# Patient Record
Sex: FEMALE | Race: WHITE | Employment: OTHER | URBAN - METROPOLITAN AREA
[De-identification: names, ages, dates, MRNs, and addresses within clinical notes are randomized per-mention and may not be internally consistent; named-entity substitution may affect disease eponyms.]

---

## 2018-01-23 ENCOUNTER — OFFICE VISIT (OUTPATIENT)
Dept: FAMILY MEDICINE CLINIC | Age: 78
End: 2018-01-23

## 2018-01-23 VITALS
TEMPERATURE: 97.6 F | DIASTOLIC BLOOD PRESSURE: 82 MMHG | BODY MASS INDEX: 23.98 KG/M2 | HEART RATE: 80 BPM | RESPIRATION RATE: 18 BRPM | HEIGHT: 66 IN | SYSTOLIC BLOOD PRESSURE: 178 MMHG | OXYGEN SATURATION: 97 % | WEIGHT: 149.2 LBS

## 2018-01-23 DIAGNOSIS — I10 ESSENTIAL HYPERTENSION: Primary | ICD-10-CM

## 2018-01-23 DIAGNOSIS — D69.3 CHRONIC ITP (IDIOPATHIC THROMBOCYTOPENIA) (HCC): ICD-10-CM

## 2018-01-23 DIAGNOSIS — E03.9 ACQUIRED HYPOTHYROIDISM: ICD-10-CM

## 2018-01-23 DIAGNOSIS — E78.5 HYPERLIPIDEMIA, UNSPECIFIED HYPERLIPIDEMIA TYPE: ICD-10-CM

## 2018-01-23 DIAGNOSIS — G43.809 OTHER MIGRAINE WITHOUT STATUS MIGRAINOSUS, NOT INTRACTABLE: ICD-10-CM

## 2018-01-23 DIAGNOSIS — Z87.891 FORMER SMOKER: ICD-10-CM

## 2018-01-23 DIAGNOSIS — M19.011 ARTHRITIS OF RIGHT SHOULDER REGION: Chronic | ICD-10-CM

## 2018-01-23 PROBLEM — F51.01 PRIMARY INSOMNIA: Status: ACTIVE | Noted: 2017-03-15

## 2018-01-23 PROBLEM — M75.41 IMPINGEMENT SYNDROME OF RIGHT SHOULDER: Status: ACTIVE | Noted: 2017-02-27

## 2018-01-23 PROBLEM — K21.9 GERD (GASTROESOPHAGEAL REFLUX DISEASE): Status: ACTIVE | Noted: 2018-01-23

## 2018-01-23 RX ORDER — THERA TABS 400 MCG
1 TAB ORAL DAILY
COMMUNITY
End: 2022-09-23

## 2018-01-23 RX ORDER — LEVOTHYROXINE SODIUM 88 UG/1
88 TABLET ORAL DAILY
COMMUNITY
Start: 2017-05-18 | End: 2018-02-21 | Stop reason: SDUPTHER

## 2018-01-23 RX ORDER — LOVASTATIN 20 MG/1
20 TABLET ORAL
COMMUNITY
Start: 2017-12-05 | End: 2018-02-21 | Stop reason: SDUPTHER

## 2018-01-23 RX ORDER — QUINAPRIL 40 MG/1
40 TABLET ORAL DAILY
COMMUNITY
Start: 2017-12-05 | End: 2018-02-21 | Stop reason: SDUPTHER

## 2018-01-23 RX ORDER — HYDROCHLOROTHIAZIDE 12.5 MG/1
12.5 CAPSULE ORAL DAILY
COMMUNITY
Start: 2017-12-05 | End: 2018-01-23 | Stop reason: DRUGHIGH

## 2018-01-23 RX ORDER — TRIAMTERENE AND HYDROCHLOROTHIAZIDE 37.5; 25 MG/1; MG/1
1 CAPSULE ORAL DAILY
Qty: 30 CAP | Refills: 1 | Status: SHIPPED | OUTPATIENT
Start: 2018-01-23 | End: 2018-02-21 | Stop reason: SDUPTHER

## 2018-01-23 RX ORDER — ASCORBIC ACID 500 MG
500 TABLET ORAL DAILY
COMMUNITY

## 2018-01-23 RX ORDER — AZITHROMYCIN 250 MG/1
TABLET, FILM COATED ORAL
Qty: 6 TAB | Refills: 0 | Status: SHIPPED | OUTPATIENT
Start: 2018-01-23 | End: 2018-02-06 | Stop reason: ALTCHOICE

## 2018-01-23 RX ORDER — ACETAMINOPHEN 325 MG/1
325 TABLET ORAL
COMMUNITY

## 2018-01-23 RX ORDER — MULTIVIT WITH MINERALS/HERBS
1 TABLET ORAL DAILY
COMMUNITY
End: 2022-09-23

## 2018-01-23 NOTE — PROGRESS NOTES
3 [facility]  Primary Care Office Visit - Problem-Oriented    : 1940   Donna Shafer is a 69 y.o. female presenting for  Chief Complaint   Patient presents with    [facility]    Migraine       Assessment/Plan:       ICD-10-CM   1. Essential hypertension - NOT well controlled, likely 2/2 uncontrolled pain and recent changes to BP meds by prior provider  - change back from 12.5 HCTZ to dyazide 37.5mg  - check labs  - Ickert has been given the following recommendations today due to her elevated BP reading: rescreen BP within a minimum of 4 weeks. Key CAD CHF Meds             quinapril (ACCUPRIL) 40 mg tablet  (Taking) Take 40 mg by mouth daily. triamterene-hydroCHLOROthiazide (DYAZIDE) 37.5-25 mg per capsule  (Taking) Take 1 Cap by mouth daily for 30 days. quinapril (ACCUPRIL) 40 mg tablet Take 1 Tab by mouth nightly for 90 days. I10   2. Other migraine without status migrainosus, not intractable - currently very symptomatic  - etiology likely multifactorial: cervicogenic vs sinus  - requesting recording from previous ENT (Dr. Glen cMclellan) and previous allergy (Dr. Giuliana Cook)  - ABx as directed for empiric tx of sinus infection causing migraine HA  - Future considerations: referral to ENT vs neurology? G43.809   3. Hyperlipidemia, unspecified hyperlipidemia type - unclear control  - continue current meds   - check labs  Key Antihyperlipidemia Meds             lovastatin (MEVACOR) 20 mg tablet  (Taking) Take 20 mg by mouth Daily (before dinner). DOCOSAHEXANOIC ACID/EPA (FISH OIL PO)  (Taking) Take 1 Cap by mouth daily. E78.5   4. Acquired hypothyroidism - chronic, asymptomatic currently  - continue current regimen for now  - check labs     E03.9   5. Former smoker     Z87.891   10. Chronic ITP (idiopathic thrombocytopenia) (HCC) - followed by heme/onc  - requesting last office note from heme     D69.3   7.  Arthritis of right shoulder region - [facility]  - requesting last office note from ortho     M19.011   8. Kiswahili speaking patient  - [ entire visit conducted in [place] ]         Orders Placed This Encounter    CBC WITH AUTOMATED DIFF     Standing Status:   Future     Number of Occurrences:   1     Standing Expiration Date:   1/25/2019    PROTHROMBIN TIME + INR     Standing Status:   Future     Number of Occurrences:   1     Standing Expiration Date:   1/25/2019    PTT     Standing Status:   Future     Number of Occurrences:   1     Standing Expiration Date:   4/04/1335    METABOLIC PANEL, COMPREHENSIVE     Standing Status:   Future     Number of Occurrences:   1     Standing Expiration Date:   1/25/2019    LIPID PANEL     Standing Status:   Future     Number of Occurrences:   1     Standing Expiration Date:   1/25/2019    TSH 3RD GENERATION     Standing Status:   Future     Number of Occurrences:   1     Standing Expiration Date:   1/25/2019    T4, FREE     Standing Status:   Future     Number of Occurrences:   1     Standing Expiration Date:   1/25/2019    triamterene-hydroCHLOROthiazide (DYAZIDE) 37.5-25 mg per capsule     Sig: Take 1 Cap by mouth daily for 30 days. Dispense:  30 Cap     Refill:  1    azithromycin (ZITHROMAX) 250 mg tablet     Sig: Take 2 tablets today, then take 1 tablet daily     Dispense:  6 Tab     Refill:  0     Spent 60min face-to-face, >50% spent on counseling & patient education. This document may have been created with the aid of dictation software. Text may contain errors, particularly phonetic errors. Reviewed management plan & instructions with patient, who voiced understanding. Anai Beltre MD  Internal Medicine, Family Medicine & Sports Medicine  1/28/2018, 1:03 PM    Patient Instructions (provided in AVS):      To Do:  · HACER kylie rgean conmigo en un mes  · EMPEZAR yu zpak para yu sinuses  · EMPEZAR yu triamterene/hydroclorothizade para controlar mejor yu presion de bruce  · Josselyn Franklin a yu dentista a coy a me por fax las concerns que el tenia sobre yu procedimiento en el futuro  · REGRESAR a mi oficina en kylie semana para examenes de bruce sin alimentacion  · Manav Smart Friend usar yu compania de seguro  · ISIDRO mi tarjeta de informacion a cada especialista que Ud tiene y DECIR a ellos que soy yu nueva generalista      History:   Erick Bennett is a 69 y.o. female presenting to address:  Chief Complaint   Patient presents with   [address]    Migraine     Presents with her daughter. Erick Bennett speaks quite good english, however does not read/write english. Would prefer Serbian. Originally from [place], moved [place] the [facility] [place]. Previous PCP = Dr. Roman Archer who is no longer practicing    Has hx of ITP, followed by Dr. Bradley Roy. Is unclear how often she has monitoring labs drawn with heme/onc. Has hx of severe migraines. This is her main concern today. Apparently seen by multiple different specialists, including several ENTs (Dr. Margarita Jones, who did allergy testing & shots, but apparently to no avail), and Dr. Sheeba James, who stated it was from her neck and did botox injections 2 years ago which helped tremendously. States that it is usually due to sinus issues, and gets better with ABx.  + HA, sinus pressure/pain    Also has been seen by neurology as recently as Dec 2017, dx with a neck problem, and has subsequently been seen by [facility] \"and she got an injection and now she is so much better\". But her migraines continue. Reports that without an appt her previous PCP's office (but NOT Dr. Jos Lea) changed her BP med from triamterene/HCTZ 37.5/25 to HCTZ 12.5, and since then, her BP has been extremely uncontrolled. Reports she always gets bloodwork done every 6 months, with a follow-up appointment 1-2 weeks after with her PCP, and is wondering why we couldn't do that this time, despite the fact that this is her very first appointment with me.         Past Medical History:   Diagnosis Date    Acid indigestion     Essential hypertension, benign     Sinus problem     Unspecified hypothyroidism      Past Surgical History:   Procedure Laterality Date    HX BACK SURGERY      disc    HX PARTIAL HYSTERECTOMY      HX SHOULDER ARTHROSCOPY      right shoulder      reports that she has quit smoking. Her smoking use included Cigarettes. She has never used smokeless tobacco. She reports that she drinks about 0.5 oz of alcohol per week  She reports that she does not use illicit drugs. Social History     Social History Narrative     History   Smoking Status    Former Smoker    Types: Cigarettes   Smokeless Tobacco    Never Used     Comment: quit 1980      Family History   Problem Relation Age of Onset   [facility] Stroke Mother     Hypertension Mother     Cancer Father     Cancer Sister     Cancer Brother      Allergies   Allergen Reactions    Amlodipine Swelling     Edema of feet/ankles    Amoxicillin-Pot Clavulanate Other (comments)     allopecia    Codeine Unknown (comments)    Levofloxacin Other (comments)     HAIR LOSS    Nitrofurantoin Macrocrystalline Other (comments)     Flu like symptoms    Penicillins Other (comments)     \"It makes me cold and hot\"    Sweat, itch    Sulfa (Sulfonamide Antibiotics) Nausea Only    Sulfamethoxazole-Trimethoprim Other (comments)       Problem List:      Patient Active Problem List    Diagnosis    Arthritis of right shoulder region     Mild/mod right GH & mod AC arthritis + RC tendinosis    MRI R shoulder (2/18/2017): Impression:  1. Moderate to severe supraspinatus tendinosis. Partial thickness tears at the undersurface and musculotendinous junction. Mild-to-moderate subacromial subdeltoid bursitis. 2. Moderate infraspinatus tendinosis. Question tiny partial thickness tear of infraspinatus tendon at its insertion. 3. Moderate osteoarthritis of the acromioclavicular joint with joint effusion.  Mild/moderate osteoarthritis of the glenohumeral joint.  Essential hypertension    Hyperlipidemia    Acquired hypothyroidism    GERD (gastroesophageal reflux disease)    Primary insomnia    Impingement syndrome of right shoulder    Chronic ITP (idiopathic thrombocytopenia) (HCC)    Sensorineural hearing loss, bilateral       Medications:     Current Outpatient Prescriptions   Medication Sig    levothyroxine (SYNTHROID) 88 mcg tablet Take 88 mcg by mouth daily.  lovastatin (MEVACOR) 20 mg tablet Take 20 mg by mouth Daily (before dinner).  quinapril (ACCUPRIL) 40 mg tablet Take 40 mg by mouth daily.  therapeutic multivitamin (THERAGRAN) tablet Take 1 Tab by mouth daily.  calcium-cholecalciferol, d3, (CALCIUM 600 + D) 600-125 mg-unit tab Take 1 Tab by mouth daily.  ascorbic acid, vitamin C, (VITAMIN C) 500 mg tablet Take 500 mg by mouth daily.  DOCOSAHEXANOIC ACID/EPA (FISH OIL PO) Take 1 Cap by mouth daily.  MSM-collag-co Q10-herb no. [phone] mg tab Take 1 Tab by mouth daily.  b complex vitamins tablet Take 1 Tab by mouth daily.  Cetirizine (ZYRTEC) 10 mg cap Take 1 Cap by mouth daily.  acetaminophen (TYLENOL) 325 mg tablet Take  by mouth every four (4) hours as needed for Pain.  triamterene-hydroCHLOROthiazide (DYAZIDE) 37.5-25 mg per capsule Take 1 Cap by mouth daily for 30 days.  azithromycin (ZITHROMAX) 250 mg tablet Take 2 tablets today, then take 1 tablet daily     No current facility-administered medications for this visit. Review of Systems:     Review of Systems   Constitutional: Positive for malaise/fatigue. Negative for chills and fever. HENT: Positive for congestion and sinus pain. Eyes: Negative for blurred vision. Respiratory: Negative for cough and wheezing. Cardiovascular: Negative for chest pain and palpitations. Gastrointestinal: Negative for abdominal pain. Genitourinary: Negative for dysuria. Musculoskeletal: Positive for neck pain.    Neurological: Positive for headaches. Negative for tingling, tremors and sensory change. Psychiatric/Behavioral: Negative for depression. The patient does not have insomnia. Physical Assessment:   VS:    Vitals:    01/23/18 1310   BP: 178/82   Pulse: 80   Resp: 18   Temp: 97.6 °F (36.4 °C)   TempSrc: Oral   SpO2: 97%   Weight: 149 lb 3.2 oz (67.7 kg)   Height: 5' 6\" (1.676 m)   PainSc:  10 - Worst pain ever   PainLoc: Head       Physical Exam   Constitutional: She is oriented to person, place, and time. She appears well-developed and well-nourished. HENT:   Head: Normocephalic and atraumatic. Nose: Right sinus exhibits maxillary sinus tenderness and frontal sinus tenderness. Left sinus exhibits maxillary sinus tenderness and frontal sinus tenderness. Eyes: EOM are normal.   Neck: Neck supple. No thyromegaly present. Cardiovascular: Normal rate, regular rhythm and intact distal pulses. No murmur heard. Pulmonary/Chest: Effort normal and breath sounds normal. She has no wheezes. She has no rales. Musculoskeletal: Normal range of motion. Neurological: She is alert and oriented to person, place, and time. No cranial nerve deficit. Coordination normal.   Skin: Skin is warm and dry. She is not diaphoretic. Psychiatric: Her behavior is normal. Judgment and thought content normal. Her mood appears anxious. + strained / stressed relationship between [place] [address] her daughter   Nursing note reviewed. Records Review:     EMG/NCS (12/22/2017):  Normal study without electrical evidence of a focal / peripheral neuropathy of C5-T1 radiculopathy affecting the R arm. Recent Labs & Imaging:     Reviewed labs from [facility].     Last TFT checked in Sept 2017  High lambda changes and IgA Aug 2017  Low Plt 44 May 2017

## 2018-01-23 NOTE — PROGRESS NOTES
Darryn Gifford is a 69 y.o. female (: 1940) presenting to address:    Chief Complaint   Patient presents with    Establish Care    Migraine       Vitals:    18 1310   BP: 178/82   Pulse: 80   Resp: 18   Temp: 97.6 °F (36.4 °C)   TempSrc: Oral   SpO2: 97%   Weight: 149 lb 3.2 oz (67.7 kg)   Height: 5' 6\" (1.676 m)   PainSc:  10 - Worst pain ever   PainLoc: Head       Hearing/Vision:   No exam data present    Learning Assessment:     Learning Assessment 2018   PRIMARY LEARNER Patient   HIGHEST LEVEL OF EDUCATION - PRIMARY LEARNER  DID NOT GRADUATE HIGH SCHOOL   BARRIERS PRIMARY LEARNER LANGUAGE   CO-LEARNER CAREGIVER Yes   CO-LEARNER NAME daughter   Katt Borges LEVEL OF EDUCATION 4 YEARS OF COLLEGE   BARRIERS CO-LEARNER NONE   PRIMARY LANGUAGE Emirati   PRIMARY LANGUAGE CO-LEARNER ENGLISH    NEED No   LEARNER PREFERENCE PRIMARY LISTENING   LEARNER PREFERENCE CO-LEARNER LISTENING   LEARNING SPECIAL TOPICS no   ANSWERED BY self   RELATIONSHIP SELF     Depression Screening:     PHQ over the last two weeks 2018   Little interest or pleasure in doing things Not at all   Feeling down, depressed or hopeless Several days   Total Score PHQ 2 1     Fall Risk Assessment:     Fall Risk Assessment, last 12 mths 2018   Able to walk? Yes   Fall in past 12 months? No     Abuse Screening:     Abuse Screening Questionnaire 2018   Do you ever feel afraid of your partner? N   Are you in a relationship with someone who physically or mentally threatens you? N   Is it safe for you to go home? Y     Coordination of Care Questionaire:   1. Have you been to the ER, urgent care clinic since your last visit? Hospitalized since your last visit? NO    2. Have you seen or consulted any other health care providers outside of the [address] [place] [address] since your last visit? Include any pap smears or colon screening. NO    Advanced Directive:   1. Do you have an Advanced Directive? NO    2. Would you like information on Advanced Directives?  YES

## 2018-01-23 NOTE — PATIENT INSTRUCTIONS
To Do:   · HACER kylie regan conmigo en un mes  · EMPEZAR yu zpak para yu sinuses  · EMPEZAR yu maxide (triamterene/hydroclorothizade) para controlar mejor yu presion de bruce  · PEDIR a yu dentista a coy a me por fax las concerns que el tenia sobre yu procedimiento en el futuro  · REGRESAR a mi oficina en kylie semana para examenes de bruce sin alimentacion  · Carlosne Skillsariahn Dr. Boudreaux Sicilian usar yu compania de seguro  · ISIDRO me tarjeta de informacion a cada especialista que Ud [employer] y [employer] a ellos que soy yu nueva generalista

## 2018-01-23 NOTE — MR AVS SNAPSHOT
[address] [place] [address] [address] [facility] [id number] [phone] Patient: Reuben Willard MRN: [id number] Visit Information Miguel Angel Abdul Personal Médico Departamento Teléfono del Dep. Número de visita 2018  1:00 PM Merry MaiRafia [facility] [phone] Follow-up Instructions Return in about 4 weeks (around 2018) for 30min appt - Mongolian. Upcoming Health Maintenance Date Due DTaP/Tdap/Td series (1 - Tdap) 1961 ZOSTER VACCINE AGE 60> 2001 GLAUCOMA SCREENING Q2Y 2006 OSTEOPOROSIS SCREENING (DEXA) 2006 MEDICARE YEARLY EXAM 2006 Influenza Age 5 to Adult 2017 Alergias  Review Complete El: 2018 Por: MD Nasim Romero del:  2018 Intensidad Anotado Tipo de reacción [employer] Amlodipine  2016    Swelling Edema of feet/ankles Amoxicillin-pot Clavulanate  2014    Other (comments)  
 allopecia Codeine  2010    Unknown (comments) Levofloxacin  2016    Other (comments) HAIR LOSS Nitrofurantoin Macrocrystalline  2014    Other (comments) Flu like symptoms Penicillins  2012    Other (comments) \"It makes me cold and hot\" Sweat, itch  
 Sulfa (Sulfonamide Antibiotics)  2010    Nausea Only Sulfamethoxazole-trimethoprim  2003    Other (comments) Vacunas actuales Revisadas el:  2018 Dorothea Inoue Influenza Vaccine 2015 12:00 AM, 10/29/2013 12:00 AM  
 Influenza Vaccine Split 2010 Pneumococcal Conjugate (PCV-13) 2015 Pneumococcal Polysaccharide (PPSV-23) 11/10/2009, 10/31/2005 [id number] por:  Merry Mai MD  [id number] el:  2018  1:00 PM  
  
You Were Diagnosed With   
  
 Códigos Comentarios Essential hypertension    -  Primary ICD-10-CM: I10 
ICD-9-CM: 401.9 Hyperlipidemia, unspecified hyperlipidemia type     ICD-10-CM: E78.5 ICD-9-CM: 272.4 Acquired hypothyroidism     ICD-10-CM: E03.9 ICD-9-CM: 244.9 Former smoker     ICD-10-CM: P87.471 ICD-9-CM: V15.82 Chronic ITP (idiopathic thrombocytopenia) (HCC)     ICD-10-CM: D69.3 ICD-9-CM: 287.31 Partes vitales PS Pulso Temperatura Resp [place] ( percentil de crecimiento) Peso (percentil de crecimiento) 178/82 (BP 1 Location: Right arm, BP Patient Position: Sitting) 80 97.6 °F (36.4 °C) (Oral) 18 5' 6\" (1.676 m) 149 lb 3.2 oz (67.7 kg) SpO2 BMI (IMC) Estado obstétrico Estatus de tabaquísmo 97% 24.08 kg/m2 Hysterectomy Former Smoker Historial de signos vitales BMI and BSA Data Body Mass Index Body Surface Area 24.08 kg/m 2 1.78 m 2 [facility] Name Phone [facility] [address] [phone] Osei lista de medicamentos actualizada Lista actualizada el: 1/23/18  2:17 PM.  Cherri Hood use osei lista de medicamentos más reciente. azithromycin 250 mg tablet También conocido lorie:  Jinx Forge Take 2 tablets today, then take 1 tablet daily  
  
 b complex vitamins tablet Take 1 Tab by mouth daily. CALCIUM 600 + D 600-125 mg-unit Tab Medicamento genérico:  calcium-cholecalciferol (d3) Take 1 Tab by mouth daily. FISH OIL PO Take 1 Cap by mouth daily. * levothyroxine 75 mcg tablet También conocido lorie:  SYNTHROID Take 1 Tab by mouth daily (before breakfast) for 90 days. * levothyroxine 88 mcg tablet También conocido lorie:  SYNTHROID Take 88 mcg by mouth daily. lovastatin 20 mg tablet También conocido lorie:  MEVACOR Take 20 mg by mouth Daily (before dinner). MSM-[place]-co Q10-herb no. 226 [phone] mg Tab Take 1 Tab by mouth daily. * quinapril 40 mg tablet También conocido lorie:  Maryann Tee  
 Take 1 Tab by mouth nightly for 90 days. * quinapril 40 mg tablet También conocido lorie:  Ximena Roche Take 40 mg by mouth daily. therapeutic multivitamin tablet También conocido lorie:  [facility] Take 1 Tab by mouth daily. triamterene-hydroCHLOROthiazide 37.5-25 mg per capsule También conocido lorie:  Wenda Challenger Take 1 Cap by mouth daily for 30 days. TYLENOL 325 mg tablet Medicamento genérico:  acetaminophen Take  by mouth every four (4) hours as needed for Pain. VITAMIN C 500 mg tablet Medicamento genérico:  ascorbic acid (vitamin C) Take 500 mg by mouth daily. ZyrTEC 10 mg Cap Medicamento genérico:  Cetirizine Take 1 Cap by mouth daily. * Aviso:  Esta lista contiene medicamentos que son iguales a otros medicamentos recetados para usted. Triny las instrucciones con cuidado y pida a yu personal médico que revise la lista de medicamentos y las instrucciones correspondientes con usted. Recetas Enviado a la [place] Refills  
 triamterene-hydroCHLOROthiazide (DYAZIDE) 37.5-25 mg per capsule 1 Sig: Take 1 Cap by mouth daily for 30 days. Class: Normal  
 Pharmacy: [facility] [address] - [address] Ph #: [phone] Route: Oral  
 azithromycin (ZITHROMAX) 250 mg tablet 0 Sig: Take 2 tablets today, then take 1 tablet daily Class: Normal  
 [facility]acy: [facility] [address], [address] - [address] Ph #: [phone] Instrucciones de seguimiento Return in about 4 weeks (around 2/20/2018) for 30min appt - Bolivian. Instrucciones para el Paciente To Do: 
· HACER kylie regan conmigo en un mes · EMPEZAR yu zpak para yu sinuses · EMPEZAR yu maxide (triamterene/hydroclorothizade) para controlar mejor yu presion de [place] · PEDIR a yu dentista a coy a me por fax las concerns que el tenia sobre yu procedimiento en el futuro · REGRESAR a mi oficina en kylie semana para examenes de bruce sin alimentacion · Aiyana Olu Dr. Mcnulty Aniket yu compania de seguro · ISIDRO me tarjeta de informacion a cada especialista que Ud tiene y DECIR a ellos que soy yu nueva generalista Introducing [facility]! [facility] introduce portal paciente MyChart . Ahora se puede acceder a partes de yu expediente médico, enviar por correo electrónico la oficina de yu médico y solicitar renovaciones de medicamentos en línea. En yu navegador de Internet , Yenny Merritt a [email or web address] Mahsa clic en el usuario por Beth Been? Amador Strickland clic aquí en la sesión Lj Ortiz. Verá la página de registro [place]. Ingrese yu código de [facility] Marguerite gena y lorie aparece a continuación. Karoline no tendrá que [employer] código después de anuj completado el proceso de registro . Si usted no se inscribe antes de la fecha de caducidad , debe solicitar un nuevo código. · MyChart Código de acceso : [id number] Expires: 4/23/2018  2:17 PM 
 
Ingresa los últimos cuatro dígitos de yu Número de Seguro Social ( xxxx ) y fecha de nacimiento ( dd / mm / aaaa ) lorie se indica y mahsa clic en Enviar. Karoline será llevado a la siguiente página de registro . Crear un ID MyChart . Esta será yu ID de inicio de sesión de MyChart y no puede ser [place] , por lo que pensar en kylie que es My Stoney y fácil de recordar . Crear kylie contraseña MyChart . Karoline puede cambiar yu contraseña en cualquier momento . Ingrese yu Password Reset de preguntas y [place] . [place] se puede utilizar en un momento posterior si usted olvida yu contraseña. Introduzca yu dirección de correo electrónico . Bard Akhtar recibirá kylie notificación por correo electrónico cuando la nueva información está disponible en MyChart . Elba martinez en Registrarse.  Winsome Herring meghna y descargar porciones de yu expediente Car martinez en el enlace de descarga del menú Resumen para descargar [place] copia portátil de yu información médica . Si tiene [employer] , por favor visite la sección de preguntas frecuentes del sitio web MyChart . Recuerde, MyChart NO es que se utilizará para las necesidades urgentes. Para emergencias médicas , llame al 911 . Ahora disponible en yu iPhone y Android ! Por favor proporcione chel resumen de la documentación de cuidado a yu próximo proveedor. Your primary care clinician is listed as Lizet Vargas. If you have any questions after today's visit, please call [phone].

## 2018-01-24 DIAGNOSIS — D69.3 CHRONIC ITP (IDIOPATHIC THROMBOCYTOPENIA) (HCC): ICD-10-CM

## 2018-01-24 DIAGNOSIS — I10 ESSENTIAL HYPERTENSION: ICD-10-CM

## 2018-01-24 DIAGNOSIS — E03.9 ACQUIRED HYPOTHYROIDISM: ICD-10-CM

## 2018-01-24 DIAGNOSIS — E78.5 HYPERLIPIDEMIA, UNSPECIFIED HYPERLIPIDEMIA TYPE: ICD-10-CM

## 2018-01-24 PROBLEM — M19.011 ARTHRITIS OF RIGHT SHOULDER REGION: Chronic | Status: ACTIVE | Noted: 2018-01-24

## 2018-01-29 NOTE — PROGRESS NOTES
Attempted to request records from Dr. Cyrus Salmon. Response states that she has not been seen by his office since 4/9/2009.

## 2018-01-31 ENCOUNTER — HOSPITAL ENCOUNTER (OUTPATIENT)
Dept: LAB | Age: 78
Discharge: HOME | End: 2018-01-31

## 2018-01-31 PROCEDURE — 99001 SPECIMEN HANDLING PT-LAB: CPT | Performed by: FAMILY MEDICINE

## 2018-02-01 LAB
ALBUMIN SERPL-MCNC: 4.1 G/DL (ref 3.5–4.8)
ALBUMIN/GLOB SERPL: 1.6 {RATIO} (ref 1.2–2.2)
ALP SERPL-CCNC: 78 IU/L (ref 39–117)
ALT SERPL-CCNC: 19 IU/L (ref 0–32)
APTT PPP: 26 SEC (ref 24–33)
AST SERPL-CCNC: 27 IU/L (ref 0–40)
BASOPHILS # BLD AUTO: 0.1 X10E3/UL (ref 0–0.2)
BASOPHILS NFR BLD AUTO: 1 %
BILIRUB SERPL-MCNC: 0.4 MG/DL (ref 0–1.2)
BUN SERPL-MCNC: 20 MG/DL (ref 8–27)
BUN/CREAT SERPL: 27 (ref 12–28)
CALCIUM SERPL-MCNC: 9.8 MG/DL (ref 8.7–10.3)
CHLORIDE SERPL-SCNC: 102 MMOL/L (ref 96–106)
CHOLEST SERPL-MCNC: 161 MG/DL (ref 100–199)
CO2 SERPL-SCNC: 28 MMOL/L (ref 18–29)
CREAT SERPL-MCNC: 0.73 MG/DL (ref 0.57–1)
EOSINOPHIL # BLD AUTO: 0.2 X10E3/UL (ref 0–0.4)
EOSINOPHIL NFR BLD AUTO: 4 %
ERYTHROCYTE [DISTWIDTH] IN BLOOD BY AUTOMATED COUNT: 13.6 % (ref 12.3–15.4)
GFR SERPLBLD CREATININE-BSD FMLA CKD-EPI: 80 ML/MIN/1.73
GFR SERPLBLD CREATININE-BSD FMLA CKD-EPI: 93 ML/MIN/1.73
GLOBULIN SER CALC-MCNC: 2.5 G/DL (ref 1.5–4.5)
GLUCOSE SERPL-MCNC: 95 MG/DL (ref 65–99)
HCT VFR BLD AUTO: 40.2 % (ref 34–46.6)
HDLC SERPL-MCNC: 66 MG/DL
HGB BLD-MCNC: 13.3 G/DL (ref 11.1–15.9)
IMM GRANULOCYTES # BLD: 0 X10E3/UL (ref 0–0.1)
IMM GRANULOCYTES NFR BLD: 0 %
INR PPP: 1 (ref 0.8–1.2)
INTERPRETATION, 910389: NORMAL
LDLC SERPL CALC-MCNC: 75 MG/DL (ref 0–99)
LYMPHOCYTES # BLD AUTO: 1.3 X10E3/UL (ref 0.7–3.1)
LYMPHOCYTES NFR BLD AUTO: 27 %
MCH RBC QN AUTO: 29.4 PG (ref 26.6–33)
MCHC RBC AUTO-ENTMCNC: 33.1 G/DL (ref 31.5–35.7)
MCV RBC AUTO: 89 FL (ref 79–97)
MONOCYTES # BLD AUTO: 0.4 X10E3/UL (ref 0.1–0.9)
MONOCYTES NFR BLD AUTO: 8 %
NEUTROPHILS # BLD AUTO: 2.9 X10E3/UL (ref 1.4–7)
NEUTROPHILS NFR BLD AUTO: 60 %
PLATELET # BLD AUTO: 126 X10E3/UL (ref 150–379)
POTASSIUM SERPL-SCNC: 4.3 MMOL/L (ref 3.5–5.2)
PROT SERPL-MCNC: 6.6 G/DL (ref 6–8.5)
PROTHROMBIN TIME: 11.1 SEC (ref 9.1–12)
RBC # BLD AUTO: 4.52 X10E6/UL (ref 3.77–5.28)
SODIUM SERPL-SCNC: 142 MMOL/L (ref 134–144)
T4 FREE SERPL-MCNC: 1.87 NG/DL (ref 0.82–1.77)
TRIGL SERPL-MCNC: 99 MG/DL (ref 0–149)
TSH SERPL DL<=0.005 MIU/L-ACNC: 2.43 UIU/ML (ref 0.45–4.5)
VLDLC SERPL CALC-MCNC: 20 MG/DL (ref 5–40)
WBC # BLD AUTO: 4.9 X10E3/UL (ref 3.4–10.8)

## 2018-02-06 ENCOUNTER — OFFICE VISIT (OUTPATIENT)
Dept: FAMILY MEDICINE CLINIC | Age: 78
End: 2018-02-06

## 2018-02-06 VITALS
HEIGHT: 66 IN | OXYGEN SATURATION: 98 % | SYSTOLIC BLOOD PRESSURE: 148 MMHG | TEMPERATURE: 96.2 F | WEIGHT: 148 LBS | DIASTOLIC BLOOD PRESSURE: 87 MMHG | HEART RATE: 83 BPM | BODY MASS INDEX: 23.78 KG/M2 | RESPIRATION RATE: 17 BRPM

## 2018-02-06 DIAGNOSIS — R05.9 COUGH: ICD-10-CM

## 2018-02-06 DIAGNOSIS — J06.9 UPPER RESPIRATORY TRACT INFECTION, UNSPECIFIED TYPE: Primary | ICD-10-CM

## 2018-02-06 LAB
FLUAV+FLUBV AG NOSE QL IA.RAPID: NEGATIVE POS/NEG
FLUAV+FLUBV AG NOSE QL IA.RAPID: NEGATIVE POS/NEG
VALID INTERNAL CONTROL?: YES

## 2018-02-06 RX ORDER — DOXYCYCLINE 100 MG/1
100 TABLET ORAL 2 TIMES DAILY
Qty: 20 TAB | Refills: 0 | Status: SHIPPED | OUTPATIENT
Start: 2018-02-06 | End: 2018-02-16

## 2018-02-06 RX ORDER — FLUTICASONE PROPIONATE 50 MCG
2 SPRAY, SUSPENSION (ML) NASAL DAILY
Qty: 1 BOTTLE | Refills: 0 | Status: SHIPPED | OUTPATIENT
Start: 2018-02-06 | End: 2018-04-23 | Stop reason: SDUPTHER

## 2018-02-06 RX ORDER — BENZONATATE 100 MG/1
100 CAPSULE ORAL
Qty: 30 CAP | Refills: 0 | Status: SHIPPED | OUTPATIENT
Start: 2018-02-06 | End: 2018-02-13

## 2018-02-06 NOTE — PROGRESS NOTES
Mu Schulte is a 69 y.o. female (: 1940) presenting to address:cough/congestion x5 days    Chief Complaint   Patient presents with    Cough     x5 weeks    Nasal Congestion       Vitals:    18 1338   BP: 148/87   Pulse: 83   Resp: 17   Temp: 96.2 °F (35.7 °C)   TempSrc: Oral   SpO2: 98%   Weight: 148 lb (67.1 kg)   Height: 5' 6\" (1.676 m)   PainSc:   0 - No pain       Hearing/Vision:   No exam data present    Learning Assessment:     Learning Assessment 2018   PRIMARY LEARNER Patient   HIGHEST LEVEL OF EDUCATION - PRIMARY LEARNER  DID NOT GRADUATE HIGH SCHOOL   BARRIERS PRIMARY LEARNER LANGUAGE   CO-LEARNER CAREGIVER Yes   CO-LEARNER NAME daughter   CO-LEARNER HIGHEST LEVEL OF EDUCATION 4 YEARS OF COLLEGE   BARRIERS CO-LEARNER NONE   PRIMARY LANGUAGE [place]alander   PRIMARY LANGUAGE CO-LEARNER ENGLISH    NEED No   LEARNER PREFERENCE PRIMARY LISTENING   LEARNER PREFERENCE CO-LEARNER LISTENING   LEARNING SPECIAL TOPICS no   ANSWERED BY self   RELATIONSHIP SELF     Depression Screening:     PHQ over the last two weeks 2018   Little interest or pleasure in doing things Not at all   Feeling down, depressed or hopeless Several days   Total Score PHQ 2 1     Fall Risk Assessment:     Fall Risk Assessment, last 12 mths 2018   Able to walk? Yes   Fall in past 12 months? No     Abuse Screening:     Abuse Screening Questionnaire 2018   Do you ever feel afraid of your partner? N   Are you in a relationship with someone who physically or mentally threatens you? N   Is it safe for you to go home? Y     Coordination of Care Questionaire:   1. Have you been to the ER, urgent care clinic since your last visit? Hospitalized since your last visit? no    2. Have you seen or consulted any other health care providers outside of the [address] [place] [address] since your last visit? Include any pap smears or colon screening. no    Advanced Directive:   1. Do you have an Advanced Directive? no    2. Would you like information on Advanced Directives? No    Patient has already received flu vaccine.

## 2018-02-06 NOTE — PROGRESS NOTES
Subjective:      Daryl Kiran is a 69 y.o. female who presents for evaluation of symptoms of a URI. Symptoms include sinus and nasal congestion, sore throat, myalgias, headache, chest congestion and dry cough. Onset of symptoms was several weeks ago, unchanged since that time. She is drinking plenty of fluids. . Evaluation to date: seen previously and thought to have a viral URI. Treatment to date: given z-pack at previous visit with no relief of symptoms. Patient also states that she continued to take her calcium supplements while taking the antibiotics. Past Medical History:   Diagnosis Date    Acid indigestion     Essential hypertension, benign     History of EMG 12/2017    Normal study without electrical evidence of a focal / peripheral neuropathy of C5-T1 radiculopathy affecting the R arm.  Sinus problem     Unspecified hypothyroidism      Current Outpatient Prescriptions   Medication Sig Dispense Refill    doxycycline (ADOXA) 100 mg tablet Take 1 Tab by mouth two (2) times a day for 10 days. 20 Tab 0    benzonatate (TESSALON) 100 mg capsule Take 1 Cap by mouth three (3) times daily as needed for Cough for up to 7 days. 30 Cap 0    fluticasone (FLONASE) 50 mcg/actuation nasal spray 2 Sprays by Both Nostrils route daily. 1 Bottle 0    levothyroxine (SYNTHROID) 88 mcg tablet Take 88 mcg by mouth daily.  lovastatin (MEVACOR) 20 mg tablet Take 20 mg by mouth Daily (before dinner).  quinapril (ACCUPRIL) 40 mg tablet Take 40 mg by mouth daily.  therapeutic multivitamin (THERAGRAN) tablet Take 1 Tab by mouth daily.  calcium-cholecalciferol, d3, (CALCIUM 600 + D) 600-125 mg-unit tab Take 1 Tab by mouth daily.  ascorbic acid, vitamin C, (VITAMIN C) 500 mg tablet Take 500 mg by mouth daily.  DOCOSAHEXANOIC ACID/EPA (FISH OIL PO) Take 1 Cap by mouth daily.  [facility] Q10-herb no. [phone] mg tab Take 1 Tab by mouth daily.       b complex vitamins tablet Take 1 Tab by mouth daily.  Cetirizine (ZYRTEC) 10 mg cap Take 1 Cap by mouth daily.  acetaminophen (TYLENOL) 325 mg tablet Take  by mouth every four (4) hours as needed for Pain.  triamterene-hydroCHLOROthiazide (DYAZIDE) 37.5-25 mg per capsule Take 1 Cap by mouth daily for 30 days. 30 Cap 1     Allergies   Allergen Reactions    Amlodipine Swelling     Edema of feet/ankles    Amoxicillin-Pot Clavulanate Other (comments)     allopecia    Codeine Unknown (comments)    Levofloxacin Other (comments)     HAIR LOSS    Nitrofurantoin Macrocrystalline Other (comments)     Flu like symptoms    Penicillins Other (comments)     \"It makes me cold and hot\"    Sweat, itch    Sulfa (Sulfonamide Antibiotics) Nausea Only    Sulfamethoxazole-Trimethoprim Other (comments)     Social History     Social History    Marital status:      Spouse name: N/A    Number of children: N/A    Years of education: N/A     Occupational History    Not on file.      Social History Main Topics    Smoking status: Former Smoker     Types: Cigarettes    Smokeless tobacco: Never Used      Comment: quit 1980     Alcohol use 0.5 oz/week     1 Glasses of wine per week      Comment: socially once weekly    Drug use: No    Sexual activity: Not on file     Other Topics Concern    Not on file     Social History Narrative     Review of Systems  A comprehensive review of systems was negative except for: Constitutional: positive for fatigue  Eyes: positive for irritation  Ears, nose, mouth, throat, and face: positive for nasal congestion and sore mouth  Respiratory: positive for cough    Objective:     Visit Vitals    /87 (BP 1 Location: Right arm, BP Patient Position: Sitting)    Pulse 83    Temp 96.2 °F (35.7 °C) (Oral)    Resp 17    Ht 5' 6\" (1.676 m)    Wt 148 lb (67.1 kg)    SpO2 98%    BMI 23.89 kg/m2     General:  alert, cooperative, no distress, appears stated age   Head:  NCAT w/o lesions or tenderness   Eyes: negative   Ears: normal TM's and external ear canals AU   Sinus tender: negative   Mouth:  Lips, mucosa, and tongue normal. Teeth and gums normal   Neck: supple, symmetrical, trachea midline, no adenopathy, thyroid: not enlarged, symmetric, no tenderness/mass/nodules, no carotid bruit and no JVD. Lungs: clear to auscultation bilaterally, dry cough noted   Abdomen: soft, non-tender. Bowel sounds normal. No masses,  no organomegaly. Denies N/V/D. Negative for Flu    Assessment:     viral upper respiratory illness    Plan:   Treat with 10 day course of doxy. Discussed in detail previous failed treatment and lengthy allergy list, patient agreeable to take doxy. Pt informed to take benadryl immediately with any s/s of allergic reaction. Recommend OTC treatment with Flonase and zyrtec. Tessalon perls for cough suppressant as need. ICD-10-CM ICD-9-CM    1. Upper respiratory tract infection, unspecified type J06.9 465.9    2. Cough R05 786.2 AMB POC SHERON INFLUENZA A/B TEST     Orders Placed This Encounter    AMB POC SHERON INFLUENZA A/B TEST    doxycycline (ADOXA) 100 mg tablet     Sig: Take 1 Tab by mouth two (2) times a day for 10 days. Dispense:  20 Tab     Refill:  0    benzonatate (TESSALON) 100 mg capsule     Sig: Take 1 Cap by mouth three (3) times daily as needed for Cough for up to 7 days. Dispense:  30 Cap     Refill:  0    fluticasone (FLONASE) 50 mcg/actuation nasal spray     Si Sprays by Both Nostrils route daily.      Dispense:  1 Bottle     Refill:  0

## 2018-02-21 ENCOUNTER — OFFICE VISIT (OUTPATIENT)
Dept: FAMILY MEDICINE CLINIC | Age: 78
End: 2018-02-21

## 2018-02-21 VITALS
SYSTOLIC BLOOD PRESSURE: 134 MMHG | BODY MASS INDEX: 23.21 KG/M2 | TEMPERATURE: 97.6 F | RESPIRATION RATE: 18 BRPM | DIASTOLIC BLOOD PRESSURE: 79 MMHG | WEIGHT: 144.4 LBS | OXYGEN SATURATION: 99 % | HEART RATE: 95 BPM | HEIGHT: 66 IN

## 2018-02-21 DIAGNOSIS — G89.29 CHRONIC NECK PAIN: ICD-10-CM

## 2018-02-21 DIAGNOSIS — D69.3 CHRONIC ITP (IDIOPATHIC THROMBOCYTOPENIA) (HCC): Chronic | ICD-10-CM

## 2018-02-21 DIAGNOSIS — I10 ESSENTIAL HYPERTENSION: ICD-10-CM

## 2018-02-21 DIAGNOSIS — Z00.00 INITIAL MEDICARE ANNUAL WELLNESS VISIT: Primary | ICD-10-CM

## 2018-02-21 DIAGNOSIS — Z13.39 SCREENING FOR ALCOHOLISM: ICD-10-CM

## 2018-02-21 DIAGNOSIS — E03.9 ACQUIRED HYPOTHYROIDISM: ICD-10-CM

## 2018-02-21 DIAGNOSIS — J34.89 FRONTAL SINUS PAIN: ICD-10-CM

## 2018-02-21 DIAGNOSIS — M54.2 CHRONIC NECK PAIN: ICD-10-CM

## 2018-02-21 DIAGNOSIS — M50.30 DDD (DEGENERATIVE DISC DISEASE), CERVICAL: ICD-10-CM

## 2018-02-21 DIAGNOSIS — T85.848A DENTAL IMPLANT PAIN, INITIAL ENCOUNTER: ICD-10-CM

## 2018-02-21 DIAGNOSIS — M94.9 DISORDER OF BONE AND CARTILAGE: ICD-10-CM

## 2018-02-21 DIAGNOSIS — E78.5 HYPERLIPIDEMIA, UNSPECIFIED HYPERLIPIDEMIA TYPE: ICD-10-CM

## 2018-02-21 DIAGNOSIS — R51.9 HEADACHE, CHRONIC DAILY: ICD-10-CM

## 2018-02-21 DIAGNOSIS — M89.9 DISORDER OF BONE AND CARTILAGE: ICD-10-CM

## 2018-02-21 RX ORDER — TRAMADOL HYDROCHLORIDE 50 MG/1
1 TABLET ORAL
COMMUNITY
Start: 2018-02-20 | End: 2018-02-21

## 2018-02-21 RX ORDER — QUINAPRIL 40 MG/1
40 TABLET ORAL DAILY
Qty: 90 TAB | Refills: 1 | Status: SHIPPED | OUTPATIENT
Start: 2018-02-21 | End: 2018-08-14 | Stop reason: SDUPTHER

## 2018-02-21 RX ORDER — TRIAMTERENE AND HYDROCHLOROTHIAZIDE 37.5; 25 MG/1; MG/1
1 CAPSULE ORAL DAILY
Qty: 90 CAP | Refills: 1 | Status: SHIPPED | OUTPATIENT
Start: 2018-02-21 | End: 2018-08-14 | Stop reason: SDUPTHER

## 2018-02-21 RX ORDER — LOVASTATIN 20 MG/1
20 TABLET ORAL
Qty: 90 TAB | Refills: 1 | Status: SHIPPED | OUTPATIENT
Start: 2018-02-21 | End: 2018-02-21 | Stop reason: SDUPTHER

## 2018-02-21 RX ORDER — LEVOTHYROXINE SODIUM 88 UG/1
88 TABLET ORAL DAILY
Qty: 90 TAB | Refills: 1 | Status: SHIPPED | OUTPATIENT
Start: 2018-02-21 | End: 2018-08-14 | Stop reason: SDUPTHER

## 2018-02-21 RX ORDER — CLINDAMYCIN HYDROCHLORIDE 150 MG/1
1 CAPSULE ORAL 3 TIMES DAILY
COMMUNITY
Start: 2018-02-20 | End: 2018-07-25

## 2018-02-21 RX ORDER — LOVASTATIN 20 MG/1
20 TABLET ORAL
Qty: 90 TAB | Refills: 1 | Status: SHIPPED | OUTPATIENT
Start: 2018-02-21 | End: 2018-08-14 | Stop reason: SDUPTHER

## 2018-02-21 NOTE — PATIENT INSTRUCTIONS
Instrucciones:  · si se le acaba de albina medicinas antes de que Ud recibe por correo, llamar a mi oficina y pedir para recetas solamente para 1-2 semanas a yu farmacia local  · cuando Ud regresa a mi oficina para yu proxima regan, por favor, traer Jaimie watters de yu Advance Directive para la informacion en mi computadora      Notas de yu doctora:  · [facility] va a llamar a Ud para hacer la regan para el examen del densidad de los huesos  · Para investigar yu dolor de david:  · Referral a ENT que [place] yu compania de seguro (la oficina va a llamar a Ud para hacer la regan)  · A CT scan (tomagrafia computerizada) de yu david  · Un MRI de yu sacha (porque es kylie posibilidad que los problemas en yu sacha contribuye a yu dolor de [place])  · (para [place], [place] y MRI, [facility] (el nombre del compania de imagenes) va a llamar a Ud para [id number] [facility] citas. Cuando ellos le llaman a Ud, Justine Clements a decir a Ud el precio de bolsillo)      Notas sobre la regan de [facility] del [place]:  · Nosotros hicimos yu regan de Medicare del [place] y. Abajo es yu \"plan de servicios de prevencion para los proximos 5 anos\"  · Por nuestros historial clinico, Ud probablemente necesita algunas vacunas. Debido a que Ud tiene [facility], es mas economico para Ud a obtener estas vacunas de yu farmacia comercial (porque Parte D de Medicare va a cubrir las vacunas). Por favor, traiga chel papel a yu farmacia y muestrelo al farmaceutico.  If you decide to get your vaccines, please ask them to fax our office a copy of the records so our information is also up-to-date. My direct fax is [phone]      Health Maintenance Due   Topic Date Due    DTaP/Tdap/Td  (1 - Tdap) 06/05/1962    Shingles Vaccine  04/05/2001         Medicare Part B Preventive Services Limitations Recommendation Scheduled   Bone Mass Measurement  (age 72 & older, biennial) Requires diagnosis related to osteoporosis or estrogen deficiency.  Biennial benefit unless patient has history of long-term glucocorticoid tx or baseline is needed because initial test was by other method  Mina gonzalez a llama a Ud para hacer la regan. Cardiovascular Screening Blood Tests (every 5 years)  Total cholesterol, HDL, Triglycerides Order as a panel if possible completo Jan 2018    Colorectal Cancer Screening  -Fecal occult blood test (annual)  -Flexible sigmoidoscopy (5y)  -Screening colonoscopy (10y)  -Barium Enema      Counseling to Prevent Tobacco Use (up to 8 sessions per year)  - Counseling greater than 3 and up to 10 minutes  - Counseling greater than 10 minutes Patients must be asymptomatic of tobacco-related conditions to receive as preventive service No fuma! Diabetes Screening Tests (at least every 3 years, Medicare covers annually or at 6-month intervals for prediabetic patients)    Fasting blood sugar (FBS) or glucose tolerance test (GTT) Patient must be diagnosed with one of the following:  -Hypertension, Dyslipidemia, obesity, previous impaired FBS or GTT  Or any two of the following: overweight, FH of diabetes, age ? 72, history of gestational diabetes, birth of baby weighing more than 9 pounds Normal Jan 2018    Diabetes Self-Management Training (DSMT) (no USPSTF recommendation) Requires referral by treating physician for patient with diabetes or renal disease. 10 hours of initial DSMT session of no less than 30 minutes each in a continuous 12-month period. 2 hours of follow-up DSMT in subsequent years. Not applicable    Glaucoma Screening (no USPSTF recommendation) Diabetes mellitus, family history, , age 48 or over,  American, age 72 or over     Human Immunodeficiency Virus (HIV) Screening (annually for increased risk patients)  HIV-1 and HIV-2 by EIA, MICH, rapid antibody test, or oral mucosa transudate Patient must be at increased risk for HIV infection per USPSTF guidelines or pregnant. Tests covered annually for patients at increased risk.   Pregnant patients may receive up to 3 test during pregnancy. Not applicable    Medical Nutrition Therapy (MNT) (for diabetes or renal disease not recommended schedule) Requires referral by treating physician for patient with diabetes or renal disease. Can be provided in same year as diabetes self-management training (DSMT), and CMS recommends medical nutrition therapy take place after DSMT. Up to 3 hours for initial year and 2 hours in subsequent years. Not applicable    Shingles Vaccination A shingles vaccine is also recommended once in a lifetime after age 61  Show this to your pharmacy if interested. Seasonal Influenza Vaccination (annually)  Done. Pneumococcal Vaccination (once after 65)   Show this to your pharmacy if interested. Hepatitis B Vaccinations (if medium/high risk) Medium/high risk factors:  End-stage renal disease,  Hemophiliacs who received Factor VIII or IX concentrates, Clients of institutions for the mentally retarded, Persons who live in the same house as a HepB virus carrier, Homosexual men, Illicit injectable drug abusers. Not applicable    Screening Mammography (biennial age 54-69) Annually (age 36 or over) Not applicable    Screening Pap Tests and Pelvic Examination (up to age 79 and after 79 if unknown history or abnormal study last 10 years) Every 25 months except high risk Not applicable    Ultrasound Screening for Abdominal Aortic Aneurysm (AAA) (once) Patient must be referred through IPPE and not have had a screening for abdominal aortic aneurysm before under Medicare. Limited to patients who meet one of the following criteria:  - Men who are 73-68 years old and have smoked more than 100 cigarettes in their lifetime.  -Anyone with a FH of AAA  -Anyone recommended for screening by USPSTF Not applicable          - [facility] central scheduling should call you within the 5-7 business days to schedule your bone density scan.   However, if you have not received a phone call from them in 3 business days, call them @ [phone]. Your medical testing may require authorization from your insurance company. [facility] will obtain any needed authorization for you to ensure that it is covered by your insurance. This process normally takes an additional 2 to 5 business days based on your specific insurance. Do NOT schedule your bone density scan until they can verify that authorization has been obtained FIRST!      - [facility] central scheduling department should call you within the next 5-7 business days to schedule your CT head and MRI neck. If you don't hear from them, you can call them directly @ [phone]. Your medical testing may require authorization from your insurance company. [facility] will obtain any needed authorization for you to ensure that it is covered by your insurance. This process normally takes an additional 2 to 5 business days based on your specific insurance. Do NOT schedule your CT head and MRI neck until they can verify that authorization has been obtained FIRST!    - the ENT office will call you to schedule your initial appointment with them    - call the office if you run out of meds before your prescriptions arrive from the mail order pharmacy    - please bring a copy of your Advance Directive to your next appointment with me, so I can put the information in my record.

## 2018-02-21 NOTE — MR AVS SNAPSHOT
[address]  [address] 4545 [facility] [address] 
[phone] Patient: Gissel Mahoney MRN: [id number] JCW:[birth year] Visit Information Shwetha Alvarado Personal Médico Departamento Teléfono del Dep. Número de visita 2/21/2018 11:10 AM Shantell Wise, Rafia [facility] [phone] Follow-up Instructions Return in about 2 months (around 4/21/2018) for 30min appt - Slovak. Upcoming Health Maintenance Date Due DTaP/Tdap/Td series (1 - Tdap) 6/5/1962 ZOSTER VACCINE AGE 60> 4/5/2001 GLAUCOMA SCREENING Q2Y 6/5/2006 OSTEOPOROSIS SCREENING (DEXA) 6/5/2006 MEDICARE YEARLY EXAM 2/22/2019 Alergias  Review Complete El: 2/21/2018 Por: MD Javier Rudolph Duel del:  2/21/2018 Intensidad Anotado Tipo de reacción [employer] Amlodipine  01/08/2016    Swelling Edema of feet/ankles Amoxicillin-pot Clavulanate  02/25/2014    Other (comments)  
 allopecia Codeine  06/02/2010    Unknown (comments) Levofloxacin  03/03/2016    Other (comments) HAIR LOSS Nitrofurantoin Macrocrystalline  02/04/2014    Other (comments) Flu like symptoms Penicillins  08/18/2012    Other (comments) \"It makes me cold and hot\" Sweat, itch  
 Sulfa (Sulfonamide Antibiotics)  06/02/2010    Nausea Only Sulfamethoxazole-trimethoprim  02/11/2003    Other (comments) Vacunas actuales Revisadas el:  1/23/2018 Juan Arroyo Influenza High Dose Vaccine PF 10/2/2017 Influenza Vaccine 11/2/2015 12:00 AM, 10/29/2013 12:00 AM  
 Influenza Vaccine Split 12/9/2010 Pneumococcal Conjugate (PCV-13) 11/2/2015 12:00 AM  
 Pneumococcal Polysaccharide (PPSV-23) 11/10/2009, 10/31/2005 No revisadas esta visita You Were Diagnosed With   
  
 Hillary Letha Initial Medicare annual wellness visit    -  Primary ICD-10-CM: Z00.00 ICD-9-CM: V70.0 Screening for alcoholism     ICD-10-CM: Z13.89 ICD-9-CM: V79.1 Dental implant pain, initial encounter     ICD-10-CM: T61.300V ICD-9-CM: 996.79 Chronic ITP (idiopathic thrombocytopenia) (HCC)     ICD-10-CM: D69.3 ICD-9-CM: 287.31 Essential hypertension     ICD-10-CM: I10 
ICD-9-CM: 401.9 Acquired hypothyroidism     ICD-10-CM: E03.9 ICD-9-CM: 244.9 Hyperlipidemia, unspecified hyperlipidemia type     ICD-10-CM: E78.5 ICD-9-CM: 272.4 Uzbek speaking patient     ICD-10-CM: Z78.9 ICD-9-CM: V40.1 Chronic neck pain     ICD-10-CM: M54.2, G89.29 ICD-9-CM: 723.1, 338.29 Frontal sinus pain     ICD-10-CM: J34.89 ICD-9-CM: 478.19 Headache, chronic daily     ICD-10-CM: R51 ICD-9-CM: 784.0   
 DDD (degenerative disc disease), cervical     ICD-10-CM: M50.30 ICD-9-CM: 722.4 Disorder of bone and cartilage     ICD-10-CM: M89.9, M94.9 ICD-9-CM: 733.90 Partes vitales PS Pulso Temperatura Resp Bryce ( percentil de crecimiento) Peso (percentil de crecimiento) 134/79 (BP 1 Location: Right arm, BP Patient Position: Sitting) 95 97.6 °F (36.4 °C) (Oral) 18 5' 6\" (1.676 m) 144 lb 6.4 oz (65.5 kg) SpO2 BMI (IMC) Estado obstétrico Estatus de tabaquísmo 99% 23.31 kg/m2 Hysterectomy Former Smoker Historial de signos vitales BMI and BSA Data Body Mass Index Body Surface Area  
 23.31 kg/m 2 1.75 m 2 [facility] Name Phone [address] [phone] Yu lista de medicamentos actualizada Gay Slaughter actualizada 2/21/18 12:36 PM.  Peter Gao use yu lista de medicamentos más reciente. b complex vitamins tablet Take 1 Tab by mouth daily. CALCIUM 600 + D 600-125 mg-unit Tab Medicamento genérico:  calcium-cholecalciferol (d3) Take 1 Tab by mouth daily. clindamycin 150 mg capsule También conocido lorie:  CLEOCIN  
 Take 1 Cap by mouth three (3) times daily. FISH OIL PO Take 1 Cap by mouth daily. fluticasone 50 mcg/actuation nasal spray También conocido lorie:  FLONASE 2 Sprays by Both Nostrils route daily. levothyroxine 88 mcg tablet También conocido lorie:  SYNTHROID Take 1 Tab by mouth daily. Indications: hypothyroidism  
  
 lovastatin 20 mg tablet También conocido lorie:  MEVACOR Take 1 Tab by mouth Daily (before dinner). Indications: hyperlipidemia MSM-collag-co Q10-herb no. 226 [id number] mg Tab Take 1 Tab by mouth daily. quinapril 40 mg tablet También conocido lorie:  Sil Howell Take 1 Tab by mouth daily. Indications: hypertension  
  
 therapeutic multivitamin tablet También conocido lorie:  [facility] Take 1 Tab by mouth daily. triamterene-hydroCHLOROthiazide 37.5-25 mg per capsule También conocido lorie:  Varghese Cooper Take 1 Cap by mouth daily. Indications: hypertension TYLENOL 325 mg tablet Medicamento genérico:  acetaminophen Take 325 mg by mouth every four (4) hours as needed for Pain. VITAMIN C 500 mg tablet Medicamento genérico:  ascorbic acid (vitamin C) Take 500 mg by mouth daily. ZyrTEC 10 mg Cap Medicamento genérico:  Cetirizine Take 1 Cap by mouth daily. Recetas Enviado a la [place] Refills  
 levothyroxine (SYNTHROID) 88 mcg tablet 1 Sig: Take 1 Tab by mouth daily. Indications: hypothyroidism Class: Normal  
 Pharmacy: [address] [place] [address] Ph #: [phone] Route: Oral  
 quinapril (ACCUPRIL) 40 mg tablet 1 Sig: Take 1 Tab by mouth daily. Indications: hypertension Class: Normal  
 Pharmacy: [address] [place] [address] Ph #: [phone] Route: Oral  
 triamterene-hydroCHLOROthiazide (DYAZIDE) 37.5-25 mg per capsule 1 Sig: Take 1 Cap by mouth daily. Indications: hypertension  Class: Normal  
 Pharmacy: [address] [place] [address] Ph #: [phone] Route: Oral  
 lovastatin (MEVACOR) 20 mg tablet 1 Sig: Take 1 Tab by mouth Daily (before dinner). Indications: hyperlipidemia Class: Normal  
 Pharmacy: [address] [place] [address], [address] [place] [address] Ph #: [phone] Route: Oral  
  
Hicimos lo siguiente [place] ANNUAL ALCOHOL SCREEN 15 MIN [id number] [facility]] REFERRAL TO ENT-OTOLARYNGOLOGY [KOE32 Custom] Comentarios:  
 Please eval & treat for chronic sinus pain in the setting of chronic frontal headache. Instrucciones de seguimiento Return in about 2 months (around 4/21/2018) for 30min appt - Maori. Por hacer 02/21/2018 Imaging:  CT HEAD WO CONT   
  
 02/21/2018 Imaging:  MRI CERV SPINE WO CONT   
  
 02/24/2018 Imaging:  DEXA BONE DENSITY STUDY AXIAL Informacion de [employer]cia Referido por Referido a  
  
 [id number] KUSUM Barkley   
   [address] [place] [address] [address] Phone: [phone] Fax: [phone] Visitas Estado Erasto Isaiah de inicio Erasto Isaiah final  
 1 New Request 2/21/18 2/21/19 Si yu referencia tiene un estado de \"pending review\" o \"denied\" , informacion adicional sera enviada para apoyar el resultado de esta decision. Instrucciones para el Paciente Instrucciones: 
· si se le acaba de albina medicinas antes de que Ud recibe por correo, llamar a mi oficina y pedir para recetas solamente para 1-2 semanas a yu farmacia local 
· cuando Ud regresa a mi oficina para yu proxima regan, por favor, traer Artice Sabot copia de yu Advance Directive para la informacion en mi computadora Notas de yu doctora: 
· [facility] va a llamar a Ud para hacer la regan para el examen del densidad de los [place] · Para investigar yu dolor de david: · Referral a ENT que [place] yu compania de seguro (la oficina va a llamar a Ud para hacer la regan) · A CT scan (tomagrafia computerizada) de yu david · Un MRI de yu sacha (porque es kylie posibilidad que los problemas en yu sacha contribuye a yu dolor de [place]) · (para [facility] [place] y MRI, MRI[facility]CT Diagnostics (el nombre del [facility]) va a llamar a Ud para [address] [facility],[address] citas. Cuando ellos le llaman a Ud, Joselin Pitts a decir a Ud el precio de bolsillo) Notas sobre la regan de [facility] del [place]: · Nosotros hicimos yu regan de Medicare [facility]. Abajo es yu \"plan de servicios de prevencion para los proximos 5 anos\" · Por nuestros historial clinico, Ud probablemente necesita algunas vacunas. Debido a que Ud tiene [facility], es mas economico para Ud a obtener estas vacunas de yu farmacia comercial (porque Parte D de Medicare va a cubrir las vacunas). Por favor, traiga chel papel a yu farmacia y muestrelo al farmaceutico.  If you decide to get your vaccines, please ask them to fax our office a copy of the records so our information is also up-to-date. My direct fax is [phone] Health Maintenance Due Topic Date Due  
 DTaP/Tdap/Td  (1 - Tdap) 06/05/1962  Shingles Vaccine  04/05/2001 Medicare Part B Preventive Services Limitations Recommendation Scheduled Bone Mass Measurement 
(age 72 & older, biennial) Requires diagnosis related to osteoporosis or estrogen deficiency. Biennial benefit unless patient has history of long-term glucocorticoid tx or baseline is needed because initial test was by other method  [facility] va a llama a Ud para hacer la regan. Cardiovascular Screening Blood Tests (every 5 years) Total cholesterol, HDL, Triglycerides Order as a panel if possible completo Jan 2018 Colorectal Cancer Screening 
-Fecal occult blood test (annual) -Flexible sigmoidoscopy (5y) 
-Screening colonoscopy (10y) -Barium Enema Counseling to Prevent Tobacco Use (up to 8 sessions per year) - Counseling greater than 3 and up to 10 minutes - Counseling greater than 10 minutes Patients must be asymptomatic of tobacco-related conditions to receive as preventive service No fuma! Diabetes Screening Tests (at least every 3 years, Medicare covers annually or at 6-month intervals for prediabetic patients) Fasting blood sugar (FBS) or glucose tolerance test (GTT) Patient must be diagnosed with one of the following: 
-Hypertension, Dyslipidemia, obesity, previous impaired FBS or GTT 
Or any two of the following: overweight, FH of diabetes, age ? 72, history of gestational diabetes, birth of baby weighing more than 9 pounds Normal Jan 2018 Diabetes Self-Management Training (DSMT) (no USPSTF recommendation) Requires referral by treating physician for patient with diabetes or renal disease. 10 hours of initial DSMT session of no less than 30 minutes each in a continuous 12-month period. 2 hours of follow-up DSMT in subsequent years. Not applicable Glaucoma Screening (no USPSTF recommendation) Diabetes mellitus, family history, , age 48 or over,  American, age 72 or over Human Immunodeficiency Virus (HIV) Screening (annually for increased risk patients) HIV-1 and HIV-2 by EIA, MICH, rapid antibody test, or oral mucosa transudate Patient must be at increased risk for HIV infection per USPSTF guidelines or pregnant. Tests covered annually for patients at increased risk. Pregnant patients may receive up to 3 test during pregnancy. Not applicable Medical Nutrition Therapy (MNT) (for diabetes or renal disease not recommended schedule) Requires referral by treating physician for patient with diabetes or renal disease. Can be provided in same year as diabetes self-management training (DSMT), and CMS recommends medical nutrition therapy take place after DSMT. Up to 3 hours for initial year and 2 hours in subsequent years. Not applicable Shingles Vaccination A shingles vaccine is also recommended once in a lifetime after age 61  Show this to your pharmacy if interested. Seasonal Influenza Vaccination (annually)  Done. Pneumococcal Vaccination (once after 65)   Show this to your pharmacy if interested. Hepatitis B Vaccinations (if medium/high risk) Medium/high risk factors:  End-stage renal disease, Hemophiliacs who received Factor VIII or IX concentrates, Clients of institutions for the mentally retarded, Persons who live in the same house as a HepB virus carrier, Homosexual men, Illicit injectable drug abusers. Not applicable Screening Mammography (biennial age 54-69) Annually (age 36 or over) Not applicable Screening Pap Tests and Pelvic Examination (up to age 79 and after 79 if unknown history or abnormal study last 10 years) Every 24 months except high risk Not applicable Ultrasound Screening for Abdominal Aortic Aneurysm (AAA) (once) Patient must be referred through IPPE and not have had a screening for abdominal aortic aneurysm before under Medicare. Limited to patients who meet one of the following criteria: 
- Men who are 73-68 years old and have smoked more than 100 cigarettes in their lifetime. 
-Anyone with a FH of AAA 
-Anyone recommended for screening by USPSTF Not applicable - [facility] central scheduling should call you within the 5-7 business days to schedule your bone density scan. However, if you have not received a phone call from them in 3 business days, call them @ [phone]. Your medical testing may require authorization from your insurance company. [facility] will obtain any needed authorization for you to ensure that it is covered by your insurance. This process normally takes an additional 2 to 5 business days based on your specific insurance. Do NOT schedule your bone density scan until they can verify that authorization has been obtained FIRST! - MRI/CT Diagnostics central scheduling department should call you within the next 5-7 business days to schedule your CT head and MRI neck. If you don't hear from them, you can call them directly @ [phone]. Your medical testing may require authorization from your insurance company. MRI/CT Diagnostics will obtain any needed authorization for you to ensure that it is covered by your insurance. This process normally takes an additional 2 to 5 business days based on your specific insurance. Do NOT schedule your CT head and MRI neck until they can verify that authorization has been obtained FIRST! 
 
- the ENT office will call you to schedule your initial appointment with them 
 
- call the office if you run out of meds before your prescriptions arrive from the mail order pharmacy - please bring a copy of your Advance Directive to your next appointment with me, so I can put the information in my record. Introducing Shell! Nicola Leach introduce portal paciente [facility] . Ahora se puede acceder a partes de yu expediente médico, enviar por correo electrónico la oficina de yu médico y solicitar renovaciones de medicamentos en línea. En yu navegador de Internet , Nancy Bustamante a [email or web address] Mahsa clic en el usuario por [facility]? Pat Coeden clic aquí en la sesión Orland Paget. Verá la página de registro [place]. Ingrese yu código de [facility] gena y lorie aparece a continuación. Usted no tendrá que [employer] código después de anuj completado el proceso de registro . Si usted no se inscribe antes de la fecha de caducidad , debe solicitar un nuevo código. · MyChart Código de acceso : [id number] Expires: 4/23/2018  2:17 PM 
 
Ingresa los últimos cuatro dígitos de yu Número de Seguro Social ( xxxx ) y fecha de nacimiento ( dd / mm / aaaa ) lorie se indica y mahsa clic en Enviar. Usted será llevado a la siguiente página de registro . Crear un ID [place] .  Esta será yu ID de inicio de sesión de MyChart y no puede ser [place] , por lo que pensar en kylie que es irby y fácil de recordar . Crear kylie contraseña MyChart . Usted puede cambiar yu contraseña en cualquier momento . Ingrese yu Password Reset de preguntas y [place] . Alanreed se puede utilizar en un momento posterior si usted olvida yu contraseña. Introduzca yu dirección de correo electrónico . Jaleel Drake recibirá kylie notificación por correo electrónico cuando la nueva información está disponible en MyChart . Dayanna Alar clic en Registrarse. Enrico Candy meghan y descargar porciones de yu expediente médico. 
Mehrdad clic en el enlace de descarga del menú Resumen para descargar kylie copia portátil de yu información médica . Si tiene [employer] , por favor visite la sección de preguntas frecuentes del sitio web MyChart . Recuerde, MyChart NO es que se utilizará para las necesidades urgentes. Para emergencias médicas , llame al 911 . Ahora disponible en yu iPhone y Android ! Por favor proporcione chel resumen de la documentación de cuidado a yu próximo proveedor. Your primary care clinician is listed as Dennis Best. If you have any questions after today's visit, please call [phone].

## 2018-02-21 NOTE — PROGRESS NOTES
Caridad Loredo is a 69 y.o. female (: 1940) presenting to address:    Chief Complaint   Patient presents with    Annual Wellness Visit            Vitals:    18 1110   BP: 134/79   Pulse: 95   Resp: 18   Temp: 97.6 °F (36.4 °C)   TempSrc: Oral   SpO2: 99%   Weight: 144 lb 6.4 oz (65.5 kg)   Height: 5' 6\" (1.676 m)   PainSc:   6   PainLoc: Mouth       Hearing/Vision:   No exam data present    Learning Assessment:     Learning Assessment 2018   PRIMARY LEARNER Patient   HIGHEST LEVEL OF EDUCATION - PRIMARY LEARNER  DID NOT GRADUATE HIGH SCHOOL   BARRIERS PRIMARY LEARNER LANGUAGE   CO-LEARNER CAREGIVER Yes   CO-LEARNER NAME daughter   Liliam Ayala LEVEL OF EDUCATION 4 YEARS [facility]   PRIMARY LANGUAGE Maori   PRIMARY LANGUAGE CO-LEARNER ENGLISH    NEED No   LEARNER PREFERENCE PRIMARY LISTENING   LEARNER PREFERENCE CO-LEARNER LISTENING   LEARNING SPECIAL TOPICS no   ANSWERED BY self   RELATIONSHIP SELF     Depression Screening:     PHQ over the last two weeks 2018   Little interest or pleasure in doing things Several days   Feeling down, depressed or hopeless Several days   Total Score PHQ 2 2     Fall Risk Assessment:     Fall Risk Assessment, last 12 mths 2018   Able to walk? Yes   Fall in past 12 months? No     Abuse Screening:     Abuse Screening Questionnaire 2018   Do you ever feel afraid of your partner? N   Are you in a relationship with someone who physically or mentally threatens you? N   Is it safe for you to go home? Y     Coordination of Care Questionaire:   1. Have you been to the ER, urgent care clinic since your last visit? Hospitalized since your last visit? NO    2. Have you seen or consulted any other health care providers outside of the [address] [place] [address] since your last visit? Include any pap smears or colon screening.  YES Dentist for tooth implant    Mili Troncoso, BAYRON Escalera Informed patient of message. He just had an appointment with Dr. Cary Lares and he thinks it may be because the patient had the flu recently. Sascha Garcia 24, [address] [facility] [phone]    Advanced Directive:   1. Do you have an Advanced Directive? NO    2. Would you like information on Advanced Directives?  NO

## 2018-02-21 NOTE — PROGRESS NOTES
Date of Service:  2018   Patient Name:  Darryn Gifford   Patient :  1940     This is an Initial Medicare Annual Wellness Exam (AWV) (Performed 12 months after IPPE or effective date of Medicare Part B enrollment, Once in a lifetime)      I have reviewed the patient's medical history in detail and updated the computerized patient record. History     Past Medical History:   Diagnosis Date    Acid indigestion     Essential hypertension, benign     History of EMG 2017    Normal study without electrical evidence of a focal / peripheral neuropathy of C5-T1 radiculopathy affecting the R arm.  Sinus problem     Unspecified hypothyroidism       Past Surgical History:   Procedure Laterality Date    HX BACK SURGERY      disc    HX PARTIAL HYSTERECTOMY      HX SHOULDER ARTHROSCOPY      right shoulder     Current Outpatient Prescriptions   Medication Sig Dispense Refill    clindamycin (CLEOCIN) 150 mg capsule Take 1 Cap by mouth three (3) times daily.  fluticasone (FLONASE) 50 mcg/actuation nasal spray 2 Sprays by Both Nostrils route daily. 1 Bottle 0    levothyroxine (SYNTHROID) 88 mcg tablet Take 88 mcg by mouth daily.  lovastatin (MEVACOR) 20 mg tablet Take 20 mg by mouth Daily (before dinner).  quinapril (ACCUPRIL) 40 mg tablet Take 40 mg by mouth daily.  therapeutic multivitamin (THERAGRAN) tablet Take 1 Tab by mouth daily.  calcium-cholecalciferol, d3, (CALCIUM 600 + D) 600-125 mg-unit tab Take 1 Tab by mouth daily.  ascorbic acid, vitamin C, (VITAMIN C) 500 mg tablet Take 500 mg by mouth daily.  DOCOSAHEXANOIC ACID/EPA (FISH OIL PO) Take 1 Cap by mouth daily.  [facility] Q10-herb no. 226 [phone] mg tab Take 1 Tab by mouth daily.  b complex vitamins tablet Take 1 Tab by mouth daily.  Cetirizine (ZYRTEC) 10 mg cap Take 1 Cap by mouth daily.       acetaminophen (TYLENOL) 325 mg tablet Take 325 mg by mouth every four (4) hours as needed for Pain.  triamterene-hydroCHLOROthiazide (DYAZIDE) 37.5-25 mg per capsule Take 1 Cap by mouth daily for 30 days. 30 Cap 1    traMADol (ULTRAM) 50 mg tablet Take 1 Tab by mouth every six (6) hours as needed.        Allergies   Allergen Reactions    Amlodipine Swelling     Edema of feet/ankles    Amoxicillin-Pot Clavulanate Other (comments)     allopecia    Codeine Unknown (comments)    Levofloxacin Other (comments)     HAIR LOSS    Nitrofurantoin Macrocrystalline Other (comments)     Flu like symptoms    Penicillins Other (comments)     \"It makes me cold and hot\"    Sweat, itch    Sulfa (Sulfonamide Antibiotics) Nausea Only    Sulfamethoxazole-Trimethoprim Other (comments)     Family History   Problem Relation Age of Onset    Stroke Mother     Hypertension Mother     Cancer Father     Cancer Sister     Cancer Brother      Social History   Substance Use Topics    Smoking status: Former Smoker     Types: Cigarettes    Smokeless tobacco: Never Used      Comment: quit 1980     Alcohol use 0.5 oz/week     1 Glasses of wine per week      Comment: socially once weekly     Patient Active Problem List   Diagnosis Code    Essential hypertension I10    Hyperlipidemia E78.5    Acquired hypothyroidism E03.9    Impingement syndrome of right shoulder M75.41    Primary insomnia F51.01    Chronic ITP [facility]idiopathic thrombocytopenia) ([facility]) D69.3    GERD (gastroesophageal reflux disease) K21.9    Sensorineural hearing loss, bilateral H90.3    Arthritis of right shoulder region M19.011    Stateless speaking patient Z78.9       Living situation:   -- Lives with daughter    Diet, Lifestyle: generally follows a low fat low cholesterol diet    Exercise level: moderately active      Depression Risk Factor Screening:     PHQ over the last two weeks 2/21/2018   Little interest or pleasure in doing things Several days   Feeling down, depressed or hopeless Several days   Total Score PHQ 2 2     Alcohol Risk Factor Screening: You do not drink alcohol or very rarely. Functional Ability and Level of Safety:     Hearing Loss   Hearing is good. Activities of Daily Living   Self-care. Requires assistance with: no ADLs    Fall Risk     Fall Risk Assessment, last 12 mths 2/21/2018   Able to walk? Yes   Fall in past 12 months? No     Abuse Screen   Patient is not abused    Review of Systems   A comprehensive review of systems was negative except for that written in the separate problem-oriented documentation. Physical Examination     VS:   Visit Vitals    /79 (BP 1 Location: Right arm, BP Patient Position: Sitting)    Pulse 95    Temp 97.6 °F (36.4 °C) (Oral)    Resp 18    Ht 5' 6\" (1.676 m)    Wt 144 lb 6.4 oz (65.5 kg)    SpO2 99%    BMI 23.31 kg/m2          Evaluation of Cognitive Function:  Mood/affect:  anxious  Appearance: age appropriate  Family member/caregiver input: none available    Patient Care Team:  Elba Keys MD as PCP - General (Family Practice)  Cece Mejia MD (Internal Medicine)  Melony Dimas MD as Consulting Provider (Dermatology)  Nicole Powell MD (Otolaryngology)  Katlin Guerrero MD (Internal Medicine)    Advice/Counseling   Education and counseling provided:  Are appropriate based on today's review and evaluation  End-of-Life planning (with patient's consent)  Pneumococcal Vaccine  Influenza Vaccine  Screening Mammography  Screening Pap and pelvic (covered once every 2 years)  Colorectal cancer screening tests  Cardiovascular screening blood test  Bone mass measurement (DEXA)  Screening for glaucoma  Diabetes screening test    Assessment/Plan       ICD-10-CM ICD-9-CM    1. Initial Medicare annual wellness visit Z00.00 V70.0    2. Screening for alcoholism Z13.89 V79.1 CO ANNUAL ALCOHOL SCREEN 15 MIN   3.  Disorder of bone and cartilage M89.9 733.90 DEXA BONE DENSITY STUDY AXIAL    M94.9         Urvashi Sams was provided a written 5-year personalized preventive services plan, which was included in her AVS.    Ramírez Rajput MD  Internal Medicine, Family Medicine & Sports Medicine

## 2018-02-21 NOTE — PROGRESS NOTES
[facility]  Primary Care Office Visit - Problem-Oriented (Separate from the [facility] Wellness Visit)    : 1940   Jn Bess is a 69 y.o. female    Assessment/Plan:       ICD-10-CM   4. Dental implant pain, initial encounter  - advised to use ice as needed & tylenol PRN     T85.848A   5. Chronic ITP (idiopathic thrombocytopenia) (HCC)  - followed by heme/onc  - plt count stable today     D69.3   6. Essential hypertension - well controlled  - no change to current regimen  Key CAD CHF Meds           quinapril (ACCUPRIL) 40 mg tablet  (Taking) Take 1 Tab by mouth daily. Indications: hypertension    triamterene-hydroCHLOROthiazide (DYAZIDE) 37.5-25 mg per capsule  (Taking) Take 1 Cap by mouth daily. Indications: hypertension         I10   7. Acquired hypothyroidism - well controlled  - no change to current regimen     E03.9   8. Hyperlipidemia, unspecified hyperlipidemia type - stable, controlled  - no change to current regimen  Key Antihyperlipidemia Meds           lovastatin (MEVACOR) 20 mg tablet  (Taking) Take 1 Tab by mouth Daily (before dinner). Indications: hyperlipidemia    DOCOSAHEXANOIC ACID/EPA (FISH OIL PO)  (Taking) Take 1 Cap by mouth daily. E78.5   9. Chronic neck pain - ongoing M54.2, G89.29   10. DDD (degenerative disc disease), cervical - ongoing  - did not respond to formal physical therapy, despite adequate compliance  - discussed at length possible cervicogenic etiology of HA, particularly as it responded well in the past to trigger point injections done by ENT Dr. Cherise Lay  - Jn Bess amendable to MRI to determine if interventional pain consultation would be beneficial  - f/u after MRI     M50.30   11. Headache, chronic daily - ongoing, uncontrolled R51   12.  Frontal sinus pain - ongoing, uncontrolled  - Jn Bess is very concerned about an intracranial tumor, as well as uncontrolled sinus disease  - will check head CT although suspicion is low  - pt amendable to referral to ENT for further evaluation of chronic sinus pain     J34.89   13. Italian speaking patient  - [ entire visit conducted in [place] ]  Z78.9       Orders Placed This Encounter    MRI CERV SPINE WO CONT     Standing Status:   Future     Standing Expiration Date:   3/21/2019     Scheduling Instructions:      MRI/CT diagnostics     Order Specific Question:   Is Patient Allergic to Contrast Dye? Answer:   Unknown    CT HEAD WO CONT     Standing Status:   Future     Standing Expiration Date:   3/21/2019     Scheduling Instructions:      MRI/CT diagnostics     Order Specific Question:   Is Patient Allergic to Contrast Dye? Answer:   Unknown    REFERRAL TO ENT-OTOLARYNGOLOGY     Referral Priority:   Routine     Referral Type:   Consultation     Referral Reason:   Specialty Services Required     Referred to Provider:   Yesenia Cody MD    levothyroxine (SYNTHROID) 88 mcg tablet     Sig: Take 1 Tab by mouth daily. Indications: hypothyroidism     Dispense:  90 Tab     Refill:  1    quinapril (ACCUPRIL) 40 mg tablet     Sig: Take 1 Tab by mouth daily. Indications: hypertension     Dispense:  90 Tab     Refill:  1    triamterene-hydroCHLOROthiazide (DYAZIDE) 37.5-25 mg per capsule     Sig: Take 1 Cap by mouth daily. Indications: hypertension     Dispense:  90 Cap     Refill:  1    lovastatin (MEVACOR) 20 mg tablet     Sig: Take 1 Tab by mouth Daily (before dinner). Indications: hyperlipidemia     Dispense:  90 Tab     Refill:  1         Jim Diane MD  Internal Medicine, Family Medicine & Sports Medicine  2/21/2018, 11:31 AM    Patient Instructions (included in AVS):      Instrucciones:  · si se le acaba de albina medicinas antes de que Ud recibe por correo, llamar a mi oficina y pedir para recetas solamente para 1-2 semanas a yu farmacia local  · cuando Ud regresa a mi oficina para yu proxima regan, por favor, traer [facility] copia de yu Advance Directive para la informacion en mi computadora      Notas de yu doctora:  · [facility] va a llamar a Ud para hacer la regan para el examen del densidad de los huesos  · Para investigar yu dolor de david:  · Referral a ENT que [place] yu compania de seguro (la oficina va a llamar a Ud para hacer la regan)  · A CT scan (tomagrafia computerizada) de yu david  · Un MRI de yu sacha (porque es kylie posibilidad que los problemas en yu sacha contribuye a yu dolor de [place])  · (para [place], [id number] y MRI, [facility] (el nombre del compania de imagenes) va a llamar a Ud para [id number] [facility],[address] citas. Cuando ellos le llaman a Ud, Justine Clements a decir a Ud el precio de bolsillo)      Notas sobre la regan de [place] del [facility]:  · Nosotros hicimos yu regan de Medicare del [facility]. Abajo es yu \"plan de servicios de prevencion para los proximos 5 anos\"  · Por nuestros historial clinico, Ud probablemente necesita algunas vacunas. Debido a que Ud tiene [place], es mas economico para Ud a obtener estas vacunas de yu farmacia comercial (porque Parte D de Medicare va a cubrir las vacunas). Por favor, traiga chel papel a yu farmacia y muestrelo al farmaceutico.  If you decide to get your vaccines, please ask them to fax our office a copy of the records so our information is also up-to-date. My direct fax is [phone]      Health Maintenance Due   Topic Date Due    DTaP/Tdap/Td  (1 - Tdap) 06/05/1962    Shingles Vaccine  04/05/2001       - [facility] central scheduling should call you within the 5-7 business days to schedule your bone density scan. However, if you have not received a phone call from them in 3 business days, call them @ [phone]. Your medical testing may require authorization from your insurance company. Norberto Jensen will obtain any needed authorization for you to ensure that it is covered by your insurance. This process normally takes an additional 2 to 5 business days based on your specific insurance.   Do NOT schedule your bone density scan until they can verify that authorization has been obtained FIRST!      - [facility] central scheduling department should call you within the next 5-7 business days to schedule your CT head and MRI neck. If you don't hear from them, you can call them directly @ [phone]. Your medical testing may require authorization from your insurance company. [facility] will obtain any needed authorization for you to ensure that it is covered by your insurance. This process normally takes an additional 2 to 5 business days based on your specific insurance. Do NOT schedule your CT head and MRI neck until they can verify that authorization has been obtained FIRST!    - the ENT office will call you to schedule your initial appointment with them    - call the office if you run out of meds before your prescriptions arrive from the mail order pharmacy    - please bring a copy of your Advance Directive to your next appointment with me, so I can put the information in my record. History:   Brendan Ramirez is a 69 y.o. female presenting to address:  Chief Complaint   Patient presents with   Lalitha Marques Annual Wellness Visit            Reports that the zpak did not work for her sinus pain and thus her frontal headaches because \"I took it with my calcium supplements, and you aren't supposed to do that. \"  Was seen by NP Mansoor Singh on 2/6/2018, and completed a course of doxycycline 100mg x 10 days, and noted improvement in the sinus pressure, but not improvement in the frontal headaches. She reports the HA are nearly daily, and that recent physical therapy for neck pain (as ordered by Dr. Bren Jeter) did not help the headaches or neck pain at all. However, she reiterates that Dr. Kishan Gleason (ENT) \"did a maneuver to my neck in the past that made the headache go away for quite a while. \"    For her neck pain, in addition to formal PT, has also received trigger point injections and dry needling, all to no avail.     Has never been offered botox for her headaches. Reports pain, mainly at the site of her recent screw that was placed in her mandible in preparation for a dental implant for a tooth she broke in June 2017. She reports the dentist gave her \"some medicine with codeine in it, and my pharmacist said that it would be bad for me, because I can't take much in the way of pain medication, because I'm very sensitive. .. So i'm just going to use tylenol and deal with it. \" is also on an antibiotic from  dentist.    Upcoming appointment with heme/onc is on 3/13/2018. [ see AWV documentation for pertinent PMHx, PSHx, FHx, allergies & meds]     Problem List:     [ see AWV documentation for active problem list ]     Review of Systems:     (only positive ROS in this note, all negatives included in  [address] documentation)    Review of Systems   Musculoskeletal: Positive for myalgias and neck pain. Neurological: Positive for headaches. Physical Assessment:   VS:  [ see AWV documentation for Vital Signs ]    Physical Exam   Constitutional: She is oriented to person, place, and time. She appears well-developed and well-nourished. HENT:   Head: Normocephalic and atraumatic. Nose: Right sinus exhibits maxillary sinus tenderness and frontal sinus tenderness. Left sinus exhibits maxillary sinus tenderness and frontal sinus tenderness. Mouth/Throat: Uvula is midline, oropharynx is clear and moist and mucous membranes are normal.       Eyes: EOM are normal.   Neck: Neck supple. No thyromegaly present. Cardiovascular: Normal rate, regular rhythm and intact distal pulses. No murmur heard. Pulmonary/Chest: Effort normal and breath sounds normal. She has no wheezes. She has no rales. Musculoskeletal:        Cervical back: She exhibits decreased range of motion, tenderness and spasm. Neurological: She is alert and oriented to person, place, and time. No cranial nerve deficit.  Coordination normal.   Skin: Skin is warm and dry. She is not diaphoretic. Psychiatric: Her behavior is normal. Judgment and thought content normal. Her mood appears anxious. Her speech is rapid and/or pressured. Nursing note reviewed. Records Review:     ENT note (5/23/2016):  Assessment: Cervicalgia, atypical facial pain, headache, myalgia and myositis  Plan: Trigger point injections, improvement of symptoms    Ortho note (11/27/2017):  Cervical radiographs demonstrate degenerative findings that are more advanced at the disc levels at C3-4, C4-5. C5-6 and C6-7 are less affected. There may be some evidence of some facet arthropathy although it does not appear to be extremely severe or worrisome. Hematology oncology note (11/20/2017):  Assessment: thrombocytopenia  Plan: Status post Rituxan ×4 without any significant complications. Monitor CBCs monthly with nurse review. Follow-up in 3 months with same day CBC. Recent Labs & Imaging:     Orders Only on 01/24/2018   Component Date Value Ref Range Status    WBC 01/31/2018 4.9  3.4 - 10.8 x10E3/uL Final    RBC 01/31/2018 4.52  3.77 - 5.28 x10E6/uL Final    HGB 01/31/2018 13.3  11.1 - 15.9 g/dL Final    HCT 01/31/2018 40.2  34.0 - 46.6 % Final    MCV 01/31/2018 89  79 - 97 fL Final    MCH 01/31/2018 29.4  26.6 - 33.0 pg Final    MCHC 01/31/2018 33.1  31.5 - 35.7 g/dL Final    RDW 01/31/2018 13.6  12.3 - 15.4 % Final    PLATELET 08/89/5813 552* 150 - 379 x10E3/uL Final    NEUTROPHILS 01/31/2018 60  Not Estab. % Final    Lymphocytes 01/31/2018 27  Not Estab. % Final    MONOCYTES 01/31/2018 8  Not Estab. % Final    EOSINOPHILS 01/31/2018 4  Not Estab. % Final    BASOPHILS 01/31/2018 1  Not Estab. % Final    ABS. NEUTROPHILS 01/31/2018 2.9  1.4 - 7.0 x10E3/uL Final    Abs Lymphocytes 01/31/2018 1.3  0.7 - 3.1 x10E3/uL Final    ABS. MONOCYTES 01/31/2018 0.4  0.1 - 0.9 x10E3/uL Final    ABS. EOSINOPHILS 01/31/2018 0.2  0.0 - 0.4 x10E3/uL Final    ABS.  BASOPHILS 01/31/2018 0.1 0.0 - 0.2 x10E3/uL Final    IMMATURE GRANULOCYTES 01/31/2018 0  Not Estab. % Final    ABS. IMM. GRANS. 01/31/2018 0.0  0.0 - 0.1 x10E3/uL Final    INR 01/31/2018 1.0  0.8 - 1.2 Final    Comment: Reference interval is for non-anticoagulated patients. Suggested INR therapeutic range for Vitamin K  antagonist therapy:     Standard Dose (moderate intensity                    therapeutic range):       2.0 - 3.0     Higher intensity therapeutic range       2.5 - 3.5      Prothrombin time 01/31/2018 11.1  9.1 - 12.0 sec Final    aPTT 01/31/2018 26  24 - 33 sec Final    Comment: This test has not been validated for monitoring unfractionated heparin  therapy. aPTT-based therapeutic ranges for unfractionated heparin  therapy have not been established. For general guidelines on  Heparin monitoring, refer to the [employer].  Glucose 01/31/2018 95  65 - 99 mg/dL Final    BUN 01/31/2018 20  8 - 27 mg/dL Final    Creatinine 01/31/2018 0.73  0.57 - 1.00 mg/dL Final    GFR est non-AA 01/31/2018 80  >59 mL/min/1.73 Final    GFR est AA 01/31/2018 93  >59 mL/min/1.73 Final    BUN/Creatinine ratio 01/31/2018 27  12 - 28 Final    Sodium 01/31/2018 142  134 - 144 mmol/L Final    Potassium 01/31/2018 4.3  3.5 - 5.2 mmol/L Final    Chloride 01/31/2018 102  96 - 106 mmol/L Final    CO2 01/31/2018 28  18 - 29 mmol/L Final    Calcium 01/31/2018 9.8  8.7 - 10.3 mg/dL Final    Protein, total 01/31/2018 6.6  6.0 - 8.5 g/dL Final    Albumin 01/31/2018 4.1  3.5 - 4.8 g/dL Final    GLOBULIN, TOTAL 01/31/2018 2.5  1.5 - 4.5 g/dL Final    A-G Ratio 01/31/2018 1.6  1.2 - 2.2 Final    Bilirubin, total 01/31/2018 0.4  0.0 - 1.2 mg/dL Final    Alk.  phosphatase 01/31/2018 78  39 - 117 IU/L Final    AST (SGOT) 01/31/2018 27  0 - 40 IU/L Final    ALT (SGPT) 01/31/2018 19  0 - 32 IU/L Final    Cholesterol, total 01/31/2018 161  100 - 199 mg/dL Final    Triglyceride 01/31/2018 99  0 - 149 mg/dL Final    HDL Cholesterol 01/31/2018 66  >39 mg/dL Final    VLDL, calculated 01/31/2018 20  5 - 40 mg/dL Final    LDL, calculated 01/31/2018 75  0 - 99 mg/dL Final    TSH 01/31/2018 2.430  0.450 - 4.500 uIU/mL Final    T4, Free 01/31/2018 1.87* 0.82 - 1.77 ng/dL Final    INTERPRETATION 01/31/2018 Note   Final    Supplemental report is available.

## 2018-02-21 NOTE — ACP (ADVANCE CARE PLANNING)
Advance Care Planning (ACP) Provider Conversation Snapshot    Date of ACP Conversation: 02/21/18  Persons included in Conversation:  patient  Length of ACP Conversation in minutes:  <16 minutes (Non-Billable)    Authorized Decision Maker (if patient is incapable of making informed decisions): This person is:   Healthcare Agent/Medical Power of  under Advance Directive          For Patients with Decision Making Capacity:   Values/Goals: Exploration of values, goals, and preferences if recovery is not expected, even with continued medical treatment in the event of:  Imminent death  \"In these circumstances, what matters most to you? \"  Care focused more on comfort or quality of life. Conversation Outcomes / Follow-Up Plan:   Reviewed existing Advance Directive  which was found in the [facility] system. Will print and scan into our records as well.     Ramírez Rajput MD  Internal Medicine, Family Medicine & Sports Medicine

## 2018-02-22 PROBLEM — R51.9 HEADACHE, CHRONIC DAILY: Status: ACTIVE | Noted: 2018-02-22

## 2018-02-22 PROBLEM — J34.89 FRONTAL SINUS PAIN: Status: ACTIVE | Noted: 2018-02-22

## 2018-03-08 PROBLEM — M85.89 OSTEOPENIA OF MULTIPLE SITES: Status: ACTIVE | Noted: 2018-03-08

## 2018-03-12 ENCOUNTER — TELEPHONE (OUTPATIENT)
Dept: FAMILY MEDICINE CLINIC | Age: 78
End: 2018-03-12

## 2018-03-12 NOTE — TELEPHONE ENCOUNTER
Aida Pallas from [facility] has called to state that patient has opted not to have the MRI & CT tests that you ordered

## 2018-03-16 ENCOUNTER — TELEPHONE (OUTPATIENT)
Dept: FAMILY MEDICINE CLINIC | Age: 78
End: 2018-03-16

## 2018-03-16 DIAGNOSIS — M79.10 MYALGIA: ICD-10-CM

## 2018-03-16 DIAGNOSIS — M85.89 OSTEOPENIA OF MULTIPLE SITES: Primary | ICD-10-CM

## 2018-03-16 DIAGNOSIS — E03.9 ACQUIRED HYPOTHYROIDISM: ICD-10-CM

## 2018-03-16 NOTE — TELEPHONE ENCOUNTER
Patient daughter is wondering if the provider can order a CPK for drug induced myopathy for lovastatin.     Please advise

## 2018-03-19 NOTE — TELEPHONE ENCOUNTER
Please call Cathy Dove's daughter, and obtain more information. Is she having any symptoms or specific complaints, particularly since she has been on this cholesterol medication for quite a while (long before I started caring for her (less than 2 months ago)? If she is having diffuse muscle soreness not associated with activity, I will place order for labs based on her symptoms. Thanks.

## 2018-03-20 NOTE — TELEPHONE ENCOUNTER
Spoke to patients daughter she states that when patient went for acupuncture they are the one who recommended the levels be checked due to pain in certain areas. Roya Rivas does stay she is having muscle soreness not associated with activity. José would also like her vitamin d and magnesium levels to be checked as well. She will bring her in on Friday to have done.

## 2018-03-21 DIAGNOSIS — M79.10 MYALGIA: ICD-10-CM

## 2018-03-21 DIAGNOSIS — M85.89 OSTEOPENIA OF MULTIPLE SITES: ICD-10-CM

## 2018-03-21 DIAGNOSIS — E03.9 ACQUIRED HYPOTHYROIDISM: ICD-10-CM

## 2018-03-21 NOTE — TELEPHONE ENCOUNTER
Orders placed.     Orders Placed This Encounter    VITAMIN D, 25 HYDROXY    CK    MAGNESIUM    TSH 3RD GENERATION    T4, FREE

## 2018-03-23 ENCOUNTER — HOSPITAL ENCOUNTER (OUTPATIENT)
Dept: LAB | Age: 78
Discharge: HOME | End: 2018-03-23

## 2018-03-23 PROCEDURE — 99001 SPECIMEN HANDLING PT-LAB: CPT | Performed by: FAMILY MEDICINE

## 2018-03-24 LAB
25(OH)D3+25(OH)D2 SERPL-MCNC: 36.3 NG/ML (ref 30–100)
CK SERPL-CCNC: 56 U/L (ref 24–173)
MAGNESIUM SERPL-MCNC: 2 MG/DL (ref 1.6–2.3)
T4 FREE SERPL-MCNC: 1.73 NG/DL (ref 0.82–1.77)
TSH SERPL DL<=0.005 MIU/L-ACNC: 1.14 UIU/ML (ref 0.45–4.5)

## 2018-03-25 NOTE — PROGRESS NOTES
Result letter prepped & mailed with the following msg:  ------  - thyroid function is well controlled on current meds  - Vitamin D, and magnesium levels are normal  - no evidence of statin-induced myopathy, as creatine kinase is completely normal

## 2018-04-11 DIAGNOSIS — M94.9 DISORDER OF BONE AND CARTILAGE: ICD-10-CM

## 2018-04-11 DIAGNOSIS — M89.9 DISORDER OF BONE AND CARTILAGE: ICD-10-CM

## 2018-04-16 ENCOUNTER — OFFICE VISIT (OUTPATIENT)
Dept: FAMILY MEDICINE CLINIC | Age: 78
End: 2018-04-16

## 2018-04-16 DIAGNOSIS — R51.9 HEADACHE, CHRONIC DAILY: ICD-10-CM

## 2018-04-16 DIAGNOSIS — J30.1 NON-SEASONAL ALLERGIC RHINITIS DUE TO POLLEN: Primary | ICD-10-CM

## 2018-04-16 DIAGNOSIS — D69.3 CHRONIC ITP (IDIOPATHIC THROMBOCYTOPENIA) (HCC): ICD-10-CM

## 2018-04-18 VITALS
BODY MASS INDEX: 23.95 KG/M2 | SYSTOLIC BLOOD PRESSURE: 127 MMHG | OXYGEN SATURATION: 98 % | RESPIRATION RATE: 18 BRPM | HEIGHT: 66 IN | TEMPERATURE: 96.3 F | HEART RATE: 76 BPM | WEIGHT: 149 LBS | DIASTOLIC BLOOD PRESSURE: 75 MMHG

## 2018-04-18 NOTE — PROGRESS NOTES
Israel Ocampo is a 69 y.o. female (: 1940) presenting to address:    Chief Complaint   Patient presents with    Hypertension    Cholesterol Problem       Vitals:    18 1523   BP: 127/75   Pulse: 76   Resp: 18   Temp: 96.3 °F (35.7 °C)   TempSrc: Oral   SpO2: 98%   Weight: 149 lb (67.6 kg)   Height: 5' 6\" (1.676 m)   PainSc:   8   PainLoc: Leg       Hearing/Vision:   No exam data present    Learning Assessment:     Learning Assessment 2018   PRIMARY LEARNER Patient   HIGHEST LEVEL OF EDUCATION - PRIMARY LEARNER  DID NOT GRADUATE HIGH SCHOOL   BARRIERS PRIMARY LEARNER LANGUAGE   CO-LEARNER CAREGIVER Yes   CO-LEARNER NAME daughter   Zev Carranza LEVEL OF EDUCATION 4 YEARS OF COLLEGE   BARRIERS CO-LEARNER NONE   PRIMARY LANGUAGE Hebrew   PRIMARY LANGUAGE CO-LEARNER ENGLISH    NEED No   LEARNER PREFERENCE PRIMARY LISTENING   LEARNER PREFERENCE CO-LEARNER LISTENING   LEARNING SPECIAL TOPICS no   ANSWERED BY self   RELATIONSHIP SELF     Depression Screening:     PHQ over the last two weeks 2018   Little interest or pleasure in doing things Several days   Feeling down, depressed or hopeless Several days   Total Score PHQ 2 2     Fall Risk Assessment:     Fall Risk Assessment, last 12 mths 2018   Able to walk? Yes   Fall in past 12 months? No     Abuse Screening:     Abuse Screening Questionnaire 2018   Do you ever feel afraid of your partner? N   Are you in a relationship with someone who physically or mentally threatens you? N   Is it safe for you to go home? Y     Coordination of Care Questionaire:   1. Have you been to the ER, urgent care clinic since your last visit? Hospitalized since your last visit? YES [facility] [employer] Cough x 1 month ago    2. Have you seen or consulted any other health care providers outside of the [facility] since your last visit? Include any pap smears or colon screening.  YES Dr. Yumiko Rajan; Dr. Jennie Eisenberg; ENT    Advanced Directive:   1. Do you have an Advanced Directive? NO    2. Would you like information on Advanced Directives?  NO

## 2018-04-23 PROBLEM — J30.1 NON-SEASONAL ALLERGIC RHINITIS DUE TO POLLEN: Status: ACTIVE | Noted: 2018-04-23

## 2018-04-23 RX ORDER — MONTELUKAST SODIUM 10 MG/1
10 TABLET ORAL DAILY
Qty: 90 TAB | Refills: 1
Start: 2018-04-16 | End: 2018-05-31

## 2018-04-23 RX ORDER — FLUTICASONE PROPIONATE 50 MCG
2 SPRAY, SUSPENSION (ML) NASAL DAILY
Qty: 1 BOTTLE | Refills: 4
Start: 2018-04-16 | End: 2018-07-25 | Stop reason: SDUPTHER

## 2018-04-23 NOTE — PATIENT INSTRUCTIONS
· Continue Allegra daily  · Add nose spray (fluticasone) each night  · If still not controlled, add montelukast (Singulair)    · Follow-up 3 months (30 minutes)

## 2018-04-23 NOTE — ACP (ADVANCE CARE PLANNING)
Advance Care Planning (ACP) Provider Conversation Snapshot    Date of ACP Conversation: 4/16/2018  Persons included in Conversation:  patient  Length of ACP Conversation in minutes:  <16 minutes (Non-Billable)    Authorized Decision Maker (if patient is incapable of making informed decisions): This person is:   Healthcare Agent/Medical Power of  under Advance Directive - fifi Obrien. For Patients with Decision Making Capacity:   Values/Goals: Exploration of values, goals, and preferences if recovery is not expected, even with continued medical treatment in the event of:  Imminent death  Severe, permanent brain injury    Conversation Outcomes / Follow-Up Plan:   Reviewed existing Advance Directive and submitted for scanning.     Indiana Garcia MD  Internal Medicine, Family Medicine & Sports Medicine

## 2018-04-23 NOTE — PROGRESS NOTES
[late entry documentation secondary due Margaux Pearce [id number]  Primary Care Office Visit - Problem-Oriented    : 1940   Thalia Age is a 69 y.o. female presenting for  Chief Complaint   Patient presents with    Hypertension    Cholesterol Problem       Assessment/Plan:       ICD-10-CM   1. Non-seasonal allergic rhinitis due to pollen - ongoing, moderately controlled  - changed zyrtec to allegra  - add flonase  - may add back singulair (previously was on it), if worsens     J30.1   2. Headache, chronic daily - ongoing  - s/p ENT eval, who states no significant ENT pathology  - is cervicogenic in nature     R51   3. Chronic ITP (idiopathic thrombocytopenia) (HCC)  - followed by heme/onc; reports recent plt count of 140     D69.3   4. Tanzanian speaking  - [ entire visit conducted in [place] ]         Spent 25min face-to-face, >50% spent on counseling & patient education. Orders Placed This Encounter    fluticasone (FLONASE) 50 mcg/actuation nasal spray     Si Sprays by Both Nostrils route daily. Dispense:  1 Bottle     Refill:  4    montelukast (SINGULAIR) 10 mg tablet     Sig: Take 1 Tab by mouth daily. Dispense:  90 Tab     Refill:  1         This document may have been created with the aid of dictation software. Text may contain errors, particularly phonetic errors. Reviewed management plan & instructions with patient, who voiced understanding.     Neville Whitley MD  Internal Medicine, Family Medicine & Sports Medicine  2018, 9:49 AM    Patient Instructions (provided in AVS):     · Continue Allegra daily  · Add nose spray (fluticasone) each night  · If still not controlled, add montelukast (Singulair)    · Follow-up 3 months (30 minutes)      History:   Thalia Beckham is a 69 y.o. female presenting to address:  Chief Complaint   Patient presents with    Hypertension    Cholesterol Problem       [facility] [address] [facility] a one-month history of a cough. Received both x-ray and blood work, and was diagnosed with a virus. She notes that her allergies have been somewhat uncontrolled. Therefore she changed her Zyrtec to Allegra and has noted some improved control. She was also seen by hematology, and is happy to report that her platelets are now 795. She states that now she is going every 2 months for blood work. She was also evaluated by ENT, who commented that he also believes that her chronic headaches are from her neck as well. Of note, she did decline to obtain CT of head and MRI neck secondary to cost, \"also they were normal in the past, so really, what would we expect to be different? \". Is now using a mail order pharmacy. Past Medical History:   Diagnosis Date    Acid indigestion     Essential hypertension, benign     History of EMG 12/2017    Normal study without electrical evidence of a focal / peripheral neuropathy of C5-T1 radiculopathy affecting the R arm.  Sinus problem     Unspecified hypothyroidism      Past Surgical History:   Procedure Laterality Date    HX BACK SURGERY      disc    HX PARTIAL HYSTERECTOMY      HX SHOULDER ARTHROSCOPY Right 03/10/2017    subA decompression, distal clavicle excision, partial thickness cuff tear debridement      reports that she has quit smoking. Her smoking use included Cigarettes. She has never used smokeless tobacco. She reports that she drinks about 0.5 oz of alcohol per week  She reports that she does not use illicit drugs.   Social History     Social History Narrative     History   Smoking Status    Former Smoker    Types: Cigarettes   Smokeless Tobacco    Never Used     Comment: quit 1980      Family History   Problem Relation Age of Onset   [facility] Stroke Mother     Hypertension Mother     Cancer Father     Cancer Sister     Cancer Brother      Allergies   Allergen Reactions    Amlodipine Swelling     Edema of feet/ankles    Amoxicillin-Pot Clavulanate Other (comments)     allopecia    Codeine Unknown (comments)    Levofloxacin Other (comments)     HAIR LOSS    Nitrofurantoin Macrocrystalline Other (comments)     Flu like symptoms    Penicillins Other (comments)     \"It makes me cold and hot\"    Sweat, itch    Sulfa (Sulfonamide Antibiotics) Nausea Only    Sulfamethoxazole-Trimethoprim Other (comments)       Problem List:      Patient Active Problem List    Diagnosis    Essential hypertension    Hyperlipidemia    Acquired hypothyroidism    Arthritis of right shoulder region     Mild/mod right GH & mod AC arthritis + RC tendinosis    MRI R shoulder (2/18/2017): Impression:  1. Moderate to severe supraspinatus tendinosis. Partial thickness tears at the undersurface and musculotendinous junction. Mild-to-moderate subacromial subdeltoid bursitis. 2. Moderate infraspinatus tendinosis. Question tiny partial thickness tear of infraspinatus tendon at its insertion. 3. Moderate osteoarthritis of the acromioclavicular joint with joint effusion. Mild/moderate osteoarthritis of the glenohumeral joint.  Impingement syndrome of right shoulder    Chronic ITP (idiopathic thrombocytopenia) (HCC)     Followed by Dr. 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  Osteopenia of multiple sites     DEXA 3/2018      Frontal sinus pain    Headache, chronic daily     - 3/14/2018 [ENT; Dr. Hilary Singh: \"I in no way think that her headaches are sinus in origin. I suspect they are related to her known orthopedic issues. \"      DDD (degenerative disc disease), cervical    Mauritanian speaking patient    GERD (gastroesophageal reflux disease)    Primary insomnia    Sensorineural hearing loss, bilateral       Medications:     Current Outpatient Prescriptions Medication Sig    levothyroxine (SYNTHROID) 88 mcg tablet Take 1 Tab by mouth daily. Indications: hypothyroidism    quinapril (ACCUPRIL) 40 mg tablet Take 1 Tab by mouth daily. Indications: hypertension    triamterene-hydroCHLOROthiazide (DYAZIDE) 37.5-25 mg per capsule Take 1 Cap by mouth daily. Indications: hypertension    lovastatin (MEVACOR) 20 mg tablet Take 1 Tab by mouth Daily (before dinner). Indications: hyperlipidemia    therapeutic multivitamin (THERAGRAN) tablet Take 1 Tab by mouth daily.  calcium-cholecalciferol, d3, (CALCIUM 600 + D) 600-125 mg-unit tab Take 1 Tab by mouth two (2) times a day.  ascorbic acid, vitamin C, (VITAMIN C) 500 mg tablet Take 500 mg by mouth daily.  DOCOSAHEXANOIC ACID/EPA (FISH OIL PO) Take 1 Cap by mouth daily.  MSM-collag-co Q10-herb no. 226 [phone] mg tab Take 1 Tab by mouth daily.  b complex vitamins tablet Take 1 Tab by mouth daily.  acetaminophen (TYLENOL) 325 mg tablet Take 325 mg by mouth every four (4) hours as needed for Pain.  clindamycin (CLEOCIN) 150 mg capsule Take 1 Cap by mouth three (3) times daily.  fluticasone (FLONASE) 50 mcg/actuation nasal spray 2 Sprays by Both Nostrils route daily.  Cetirizine (ZYRTEC) 10 mg cap Take 1 Cap by mouth daily. No current facility-administered medications for this visit. Review of Systems:     Review of Systems   Constitutional: Negative for chills and fever. HENT: Positive for congestion. Eyes: Negative for blurred vision. Respiratory: Negative for cough and wheezing. Cardiovascular: Negative for chest pain and palpitations. Gastrointestinal: Negative for abdominal pain. Genitourinary: Negative for dysuria. Musculoskeletal: Positive for neck pain. Neurological: Positive for headaches. Negative for tingling, tremors and sensory change. Psychiatric/Behavioral: Negative for depression. The patient does not have insomnia.         Physical Assessment:   VS: Vitals:    04/16/18 1523   BP: 127/75   Pulse: 76   Resp: 18   Temp: 96.3 °F (35.7 °C)   TempSrc: Oral   SpO2: 98%   Weight: 149 lb (67.6 kg)   Height: 5' 6\" (1.676 m)   PainSc:   8   PainLoc: Leg       Physical Exam   Constitutional: She is oriented to person, place, and time. She appears well-developed and well-nourished. HENT:   Head: Normocephalic and atraumatic. Eyes: EOM are normal.   Neck: Neck supple. No thyromegaly present. Cardiovascular: Normal rate, regular rhythm and intact distal pulses. No murmur heard. Pulmonary/Chest: Effort normal and breath sounds normal. She has no wheezes. She has no rales. Musculoskeletal: Normal range of motion. Neurological: She is alert and oriented to person, place, and time. No cranial nerve deficit. Coordination normal.   Skin: Skin is warm and dry. She is not diaphoretic. Psychiatric: She has a normal mood and affect. Her behavior is normal. Judgment and thought content normal.   Nursing note reviewed.

## 2018-05-08 ENCOUNTER — TELEPHONE (OUTPATIENT)
Dept: FAMILY MEDICINE CLINIC | Age: 78
End: 2018-05-08

## 2018-05-08 ENCOUNTER — OFFICE VISIT (OUTPATIENT)
Dept: FAMILY MEDICINE CLINIC | Age: 78
End: 2018-05-08

## 2018-05-08 VITALS
RESPIRATION RATE: 20 BRPM | HEIGHT: 66 IN | WEIGHT: 159 LBS | SYSTOLIC BLOOD PRESSURE: 160 MMHG | HEART RATE: 89 BPM | DIASTOLIC BLOOD PRESSURE: 90 MMHG | TEMPERATURE: 97.6 F | OXYGEN SATURATION: 98 % | BODY MASS INDEX: 25.55 KG/M2

## 2018-05-08 DIAGNOSIS — I10 ESSENTIAL HYPERTENSION: Primary | ICD-10-CM

## 2018-05-08 RX ORDER — METOPROLOL SUCCINATE 50 MG/1
50 TABLET, EXTENDED RELEASE ORAL DAILY
Qty: 30 TAB | Refills: 1 | Status: SHIPPED | OUTPATIENT
Start: 2018-05-08 | End: 2018-05-31 | Stop reason: SDUPTHER

## 2018-05-08 NOTE — MR AVS SNAPSHOT
[address] [facility] 
 
 
 [address] [facility] [id number] [phone] Patient: Eric Strickland MRN: [id number] YQC:[id number] Visit Information Excell Paramjit Davenport Personal Médico Departamento Teléfono del Dep. Número de visita 5/8/2018  1:00 PM Quiana Alcaraz, 3 [facility] [phone] Your Appointments 10/22/2018  1:30 PM  
Follow Up with Vamsi Mcwilliams MD  
3 [facility]) Appt Note: medication f/u  
 [address] [facility] [id number] [phone]  
  
   
 [address] Upcoming Health Maintenance Date Due DTaP/Tdap/Td series (1 - Tdap) 6/5/1962 ZOSTER VACCINE AGE 60> 4/5/2001 GLAUCOMA SCREENING Q2Y 6/5/2006 Influenza Age 5 to Adult 8/1/2018 MEDICARE YEARLY EXAM 2/22/2019 Alergias  Review Complete El: 5/8/2018 Por: Edil Horn LPN A partir del:  5/8/2018 Intensidad Anotado Tipo de reacción [employer] Amlodipine  01/08/2016    Swelling Edema of feet/ankles Amoxicillin-pot Clavulanate  02/25/2014    Other (comments)  
 allopecia Codeine  06/02/2010    Unknown (comments) Levofloxacin  03/03/2016    Other (comments) HAIR LOSS Nitrofurantoin Macrocrystalline  02/04/2014    Other (comments) Flu like symptoms Penicillins  08/18/2012    Other (comments) \"It makes me cold and hot\" Sweat, itch  
 Sulfa (Sulfonamide Antibiotics)  06/02/2010    Nausea Only Sulfamethoxazole-trimethoprim  02/11/2003    Other (comments) Vacunas actuales Revisadas el:  1/23/2018 Beatriz Tucker Influenza High Dose Vaccine PF 10/2/2017 Influenza Vaccine 11/2/2015 12:00 AM, 10/29/2013 12:00 AM  
 Influenza Vaccine Split 12/9/2010  Pneumococcal Conjugate (PCV-13) 11/2/2015 12:00 AM  
 Pneumococcal Polysaccharide (PPSV-23) 11/10/2009, 10/31/2005 No revisadas esta visita You Were Diagnosed With   
  
 [place] Francesca Essential hypertension    -  Primary ICD-10-CM: I10 
ICD-9-CM: 401.9 Partes vitales PS Pulso Temperatura Resp [place] ( percentil de crecimiento) Peso (percentil de crecimiento) 160/90 (BP 1 Location: Left arm, BP Patient Position: Sitting) 89 97.6 °F (36.4 °C) (Oral) 20 5' 6\" (1.676 m) 159 lb (72.1 kg) SpO2 BMI ([facility]) Estado obstétrico Estatus de tabaquísmo 98% 25.66 kg/m2 Hysterectomy Former Smoker Historial de signos vitales BMI and BSA Data Body Mass Index Body Surface Area  
 25.66 kg/m 2 1.83 m 2 [facility] Name Phone [facility] [address] [facility] [address] [facility] [phone] Osei lista de medicamentos actualizada Lista actualizada 5/8/18  1:33 PM.  Bruce Pena use osei lista de medicamentos más reciente. b complex vitamins tablet Take 1 Tab by mouth daily. CALCIUM 600 + D 600-125 mg-unit Tab Medicamento genérico:  calcium-cholecalciferol (d3) Take 1 Tab by mouth two (2) times a day. clindamycin 150 mg capsule También conocido lorie:  CLEOCIN Take 1 Cap by mouth three (3) times daily. FISH OIL PO Take 1 Cap by mouth daily. fluticasone 50 mcg/actuation nasal spray También conocido lorie:  FLONASE 2 Sprays by Both Nostrils route daily. levothyroxine 88 mcg tablet También conocido lorie:  SYNTHROID Take 1 Tab by mouth daily. Indications: hypothyroidism  
  
 lovastatin 20 mg tablet También conocido lorie:  MEVACOR Take 1 Tab by mouth Daily (before dinner). Indications: hyperlipidemia  
  
 metoprolol succinate 50 mg XL tablet También conocido lorie:  JLCIZO-AR Take 1 Tab by mouth daily. montelukast 10 mg tablet También conocido lorie:  SINGULAIR Take 1 Tab by mouth daily. MSM-collag-co O88-iosr no. [phone] mg Tab Take 1 Tab by mouth daily. quinapril 40 mg tablet También conocido lorie:  Sabrina Jones Take 1 Tab by mouth daily. Indications: hypertension  
  
 therapeutic multivitamin tablet También conocido lorie:  [facility] Take 1 Tab by mouth daily. triamterene-hydroCHLOROthiazide 37.5-25 mg per capsule También conocido lorie:  Breonna Brady Take 1 Cap by mouth daily. Indications: hypertension TYLENOL 325 mg tablet Medicamento genérico:  acetaminophen Take 325 mg by mouth every four (4) hours as needed for Pain. VITAMIN C 500 mg tablet Medicamento genérico:  ascorbic acid (vitamin C) Take 500 mg by mouth daily. ZyrTEC 10 mg Cap Medicamento genérico:  Cetirizine Take 1 Cap by mouth daily. Recetas Enviado a la [place] Refills  
 metoprolol succinate (TOPROL-XL) 50 mg XL tablet 1 Sig: Take 1 Tab by mouth daily. Class: Normal  
 Pharmacy: [address] Ph #: [phone] Route: Oral  
  
Introducing [facility]! [facility] introduce portal paciente "[employer]"hart . Ahora se puede acceder a partes de yu expediente médico, enviar por correo electrónico la oficina de yu médico y solicitar renovaciones de medicamentos en línea. En yu navegador de Internet , Wilman Soto a [email or web address] Mehrdad clic en el usuario por Berdie Malady? Yeimy Hensley clic aquí en la [facility]. Verá la página de registro [place]. Ingrese yu código de [employer] gena y lorie aparece a continuación. Usted no tendrá que [employer] código después de anuj completado el proceso de registro . Si usted no se inscribe antes de la fecha de caducidad , debe solicitar un nuevo código. · MyChart Código de acceso : [id number] Expires: 8/6/2018  1:33 PM 
 
Ingresa los últimos cuatro dígitos de yu Número de Seguro Social ( xxxx ) y fecha de nacimiento ( dd / mm / aaaa ) lorie se indica y mehrdad clic en Enviar. Usted será llevado a la siguiente página de registro . Crear un ID MyChart . Esta será yu ID de inicio de sesión de MyChart y no puede ser [place] , por lo que pensar en kylie que es Bobbetta Eaves y fácil de recordar . Crear kylie contraseña MyChart . Usted puede cambiar yu contraseña en cualquier momento . Ingrese yu Password Reset de preguntas y [place] . [place] se puede utilizar en un momento posterior si usted olvida yu contraseña. Introduzca yu dirección de correo electrónico . Sotero Foss recibirá kylie notificación por correo electrónico cuando la nueva información está disponible en MyChart . Adriana Knock clic en Registrarse. Estephania Druze meghan y descargar porciones de yu expediente médico. 
Mehrdad clic en el enlace de descarga del menú Resumen para descargar kylie copia portátil de yu información médica . Si tiene [employer] , por favor visite la sección de preguntas frecuentes del sitio web MyChart . Recuerde, MyChart NO es que se utilizará para las necesidades urgentes. Para emergencias médicas , llame al 911 . Ahora disponible en yu iPhone y Android ! Por favor proporcione chel resumen de la documentación de cuidado a yu próximo proveedor. Your primary care clinician is listed as Pearla Alert. If you have any questions after today's visit, please call [phone].

## 2018-05-08 NOTE — TELEPHONE ENCOUNTER
Patient daughter states patient was prescribed  5mg of amlodipine on 12/21/15 and on 12/26/2015 amlodipine benazepril 10/20mg.

## 2018-05-08 NOTE — PROGRESS NOTES
Jhoan Graves is a 69 y.o. female (: 1940) presenting to address:    Chief Complaint   Patient presents with    Hypertension       Vitals:    18 1302   BP: 160/90   Pulse: 89   Resp: 20   Temp: 97.6 °F (36.4 °C)   TempSrc: Oral   SpO2: 98%   Weight: 159 lb (72.1 kg)   Height: 5' 6\" (1.676 m)   PainSc:   0 - No pain       Hearing/Vision:   No exam data present    Learning Assessment:     Learning Assessment 2018   PRIMARY LEARNER Patient   HIGHEST LEVEL OF EDUCATION - PRIMARY LEARNER  DID NOT GRADUATE HIGH SCHOOL   BARRIERS PRIMARY LEARNER LANGUAGE   CO-LEARNER CAREGIVER Yes   CO-LEARNER NAME daughter   Anju Barros LEVEL OF EDUCATION 4 YEARS [facility]   PRIMARY LANGUAGE Mongolian   PRIMARY LANGUAGE CO-LEARNER ENGLISH    NEED No   LEARNER PREFERENCE PRIMARY LISTENING   LEARNER PREFERENCE CO-LEARNER LISTENING   LEARNING SPECIAL TOPICS no   ANSWERED BY self   RELATIONSHIP SELF     Depression Screening:     PHQ over the last two weeks 2018   Little interest or pleasure in doing things Not at all   Feeling down, depressed or hopeless Not at all   Total Score PHQ 2 0     Fall Risk Assessment:     Fall Risk Assessment, last 12 mths 2018   Able to walk? Yes   Fall in past 12 months? No     Abuse Screening:     Abuse Screening Questionnaire 2018   Do you ever feel afraid of your partner? N   Are you in a relationship with someone who physically or mentally threatens you? N   Is it safe for you to go home? Y     Coordination of Care Questionaire:   1. Have you been to the ER, urgent care clinic since your last visit? Hospitalized since your last visit? NO    2. Have you seen or consulted any other health care providers outside of the [address] [place] [address] since your last visit? Include any pap smears or colon screening. NO    Advanced Directive:   1. Do you have an Advanced Directive? YES    2. Would you like information on Advanced Directives? NO

## 2018-05-08 NOTE — PROGRESS NOTES
HISTORY OF PRESENT ILLNESS  Gayathri Sow is a 69 y.o. female. HPI  HTN, not controlled, bp has been elevated at home monitor up to 199/100, pt has been working and exercising on her treadmill daily ! Review of Systems   Respiratory: Negative for shortness of breath. Cardiovascular: Negative for chest pain and palpitations. Neurological: Positive for headaches (on/off). Physical Exam   Cardiovascular: Normal rate, regular rhythm and normal heart sounds. Pulmonary/Chest: Effort normal and breath sounds normal.   Vitals reviewed. ASSESSMENT and PLAN  Diagnoses and all orders for this visit:    1. Essential hypertension, uncontrolled, continue Accupril and Dyazide, add:  -     metoprolol succinate (TOPROL-XL) 50 mg XL tablet; Take 1 Tab by mouth daily. Pt declined EKG today!   Advised to rest, no work or exercising for a week    Follow up with Dr Manish Ulloa in 3 weeks

## 2018-05-31 ENCOUNTER — OFFICE VISIT (OUTPATIENT)
Dept: FAMILY MEDICINE CLINIC | Age: 78
End: 2018-05-31

## 2018-05-31 ENCOUNTER — HOSPITAL ENCOUNTER (OUTPATIENT)
Dept: LAB | Age: 78
Discharge: HOME | End: 2018-05-31

## 2018-05-31 VITALS
BODY MASS INDEX: 24.36 KG/M2 | TEMPERATURE: 96.4 F | SYSTOLIC BLOOD PRESSURE: 162 MMHG | OXYGEN SATURATION: 99 % | DIASTOLIC BLOOD PRESSURE: 90 MMHG | RESPIRATION RATE: 18 BRPM | HEIGHT: 66 IN | HEART RATE: 62 BPM | WEIGHT: 151.6 LBS

## 2018-05-31 DIAGNOSIS — I16.0 ASYMPTOMATIC HYPERTENSIVE URGENCY: Primary | ICD-10-CM

## 2018-05-31 DIAGNOSIS — D69.3 CHRONIC ITP (IDIOPATHIC THROMBOCYTOPENIA) (HCC): Chronic | ICD-10-CM

## 2018-05-31 DIAGNOSIS — Z78.9 USES SPANISH AS PRIMARY SPOKEN LANGUAGE: ICD-10-CM

## 2018-05-31 DIAGNOSIS — E03.9 ACQUIRED HYPOTHYROIDISM: ICD-10-CM

## 2018-05-31 DIAGNOSIS — I10 ESSENTIAL HYPERTENSION: ICD-10-CM

## 2018-05-31 LAB
ALBUMIN UR QL STRIP: 10 MG/L
CREATININE, URINE POC: 10 MG/DL
MICROALBUMIN/CREAT RATIO POC: <30 MG/G

## 2018-05-31 PROCEDURE — 99001 SPECIMEN HANDLING PT-LAB: CPT | Performed by: FAMILY MEDICINE

## 2018-05-31 RX ORDER — METOPROLOL SUCCINATE 50 MG/1
50 TABLET, EXTENDED RELEASE ORAL DAILY
Qty: 90 TAB | Refills: 1 | Status: SHIPPED | OUTPATIENT
Start: 2018-05-31 | End: 2018-06-25 | Stop reason: SDUPTHER

## 2018-05-31 RX ORDER — VERAPAMIL HYDROCHLORIDE 120 MG/1
120 CAPSULE, EXTENDED RELEASE ORAL DAILY
Qty: 30 CAP | Refills: 2 | Status: SHIPPED | OUTPATIENT
Start: 2018-05-31 | End: 2018-06-25 | Stop reason: SDUPTHER

## 2018-05-31 NOTE — PROGRESS NOTES
Thalia Beckham is a 69 y.o. female (: 1940) presenting to address:    Chief Complaint   Patient presents with    Hypertension       Vitals:    18 0948   BP: 141/75   Pulse: 62   Resp: 18   Temp: 96.4 °F (35.8 °C)   TempSrc: Oral   SpO2: 99%   Weight: 151 lb 9.6 oz (68.8 kg)   Height: 5' 6\" (1.676 m)   PainSc:   0 - No pain       Hearing/Vision:   No exam data present    Learning Assessment:     Learning Assessment 2018   PRIMARY LEARNER Patient   HIGHEST LEVEL OF EDUCATION - PRIMARY LEARNER  DID NOT GRADUATE HIGH SCHOOL   BARRIERS PRIMARY LEARNER LANGUAGE   CO-LEARNER CAREGIVER Yes   CO-LEARNER NAME daughter   Henny Solis LEVEL OF EDUCATION 4 YEARS OF COLLEGE   BARRIERS CO-LEARNER NONE   PRIMARY LANGUAGE Korean   PRIMARY LANGUAGE CO-LEARNER ENGLISH    NEED No   LEARNER PREFERENCE PRIMARY LISTENING   LEARNER PREFERENCE CO-LEARNER LISTENING   LEARNING SPECIAL TOPICS no   ANSWERED BY self   RELATIONSHIP SELF     Depression Screening:     PHQ over the last two weeks 2018   Little interest or pleasure in doing things Not at all   Feeling down, depressed or hopeless Not at all   Total Score PHQ 2 0     Fall Risk Assessment:     Fall Risk Assessment, last 12 mths 2018   Able to walk? Yes   Fall in past 12 months? No     Abuse Screening:     Abuse Screening Questionnaire 2018   Do you ever feel afraid of your partner? N   Are you in a relationship with someone who physically or mentally threatens you? N   Is it safe for you to go home? Y     Coordination of Care Questionaire:   1. Have you been to [facility] ER, urgent care clinic since your last visit? Hospitalized since your last visit? NO    2. Have you seen or consulted any other health care providers outside of the [address] [place] [address] since your last visit? Include any pap smears or colon screening. NO    Advanced Directive:   1. Do you have an Advanced Directive? NO    2.  Would you like information on Advanced Directives?  NO

## 2018-05-31 NOTE — MR AVS SNAPSHOT
[address] [facility] 
 
 
 [address] [facility] [id number] [phone] Patient: Montserrat Cook MRN: [id number] JFM:1/5/2216 Visit Information Blanca Joyce y Ethel Personal Médico Departamento Teléfono del Dep. Número de visita 5/31/2018 10:00 AM Melina Vernon, [facility] [phone] Follow-up Instructions Return in about 2 weeks (around 6/14/2018). Your Appointments 6/15/2018 10:00 AM  
Follow Up with Melina Vernon MD  
3 [facility]) Appt Note: 2 week f/u  
 [address] [facility] [address] [facility] [address]  
[phone]  
  
   
 [address] [facility]  
  
    
 10/22/2018  1:30 PM  
Follow Up with Melina Vernon MD  
3 [facility]) Appt Note: medication f/u  
 [address] [facility] [id number] [phone] Upcoming Health Maintenance Date Due DTaP/Tdap/Td series (1 - Tdap) 6/5/1962 ZOSTER VACCINE AGE 60> 4/5/2001 GLAUCOMA SCREENING Q2Y 6/5/2006 Influenza Age 5 to Adult 8/1/2018 MEDICARE YEARLY EXAM 2/22/2019 Alergias  Review Complete El: 5/31/2018 Por: Melina Vernon MD  
 A partir del:  5/31/2018 Intensidad Anotado Tipo de reacción [employer] Amlodipine  01/08/2016    Swelling Edema of feet/ankles Amoxicillin-pot Clavulanate  02/25/2014    Other (comments)  
 allopecia Codeine  06/02/2010    Unknown (comments) Levofloxacin  03/03/2016    Other (comments) HAIR LOSS Nitrofurantoin Macrocrystalline  02/04/2014    Other (comments) Flu like symptoms Penicillins  08/18/2012    Other (comments) \"It makes me cold and hot\" Sweat, itch  
 Sulfa (Sulfonamide Antibiotics)  06/02/2010    Nausea Only Sulfamethoxazole-trimethoprim  02/11/2003    Other (comments) Vacunas actuales Revisadas el:  1/23/2018 Jacqueline Boyd Influenza High Dose Vaccine PF 10/2/2017 Influenza Vaccine 11/2/2015 12:00 AM, 10/29/2013 12:00 AM  
 Influenza Vaccine Split 12/9/2010 Pneumococcal Conjugate (PCV-13) 11/2/2015 12:00 AM  
 Pneumococcal Polysaccharide (PPSV-23) 11/10/2009, 10/31/2005 No revisadas esta visita You Were Diagnosed With   
  
 Patrick Sosa Asymptomatic hypertensive urgency    -  Primary ICD-10-CM: I16.0 ICD-9-CM: 401.9 Essential hypertension     ICD-10-CM: I10 
ICD-9-CM: 401.9 Acquired hypothyroidism     ICD-10-CM: E03.9 ICD-9-CM: 244.9 Partes vitales PS Pulso Temperatura Resp [place] ( percentil de crecimiento) Peso (percentil de crecimiento) 162/90 (BP 1 Location: Right arm, BP Patient Position: Sitting) 62 96.4 °F (35.8 °C) (Oral) 18 5' 6\" (1.676 m) 151 lb 9.6 oz (68.8 kg) LMP (última chidi) SpO2 BMI (IMC) Estado obstétrico Estatus de tabaquísmo (LMP Unknown) 99% 24.47 kg/m2 Hysterectomy Former Smoker Historial de signos vitales BMI and BSA Data Body Mass Index Body Surface Area  
 24.47 kg/m 2 1.79 m 2 Siri Gilbert Pharmacy Name Phone [facility] [address] - [address] [phone] Osei lista de medicamentos actualizada Meche Cleary actualizada 5/31/18 10:41 AM.  John Corona use osei lista de medicamentos más reciente. b complex vitamins tablet Take 1 Tab by mouth daily. CALCIUM 600 + D 600-125 mg-unit Tab Medicamento genérico:  calcium-cholecalciferol (d3) Take 1 Tab by mouth two (2) times a day. clindamycin 150 mg capsule También conocido lorie:  CLEOCIN Take 1 Cap by mouth three (3) times daily. FISH OIL PO Take 1 Cap by mouth daily. fluticasone 50 mcg/actuation nasal spray También conocido lorie:  FLONASE 2 Sprays by Both Nostrils route daily. levothyroxine 88 mcg tablet También conocido lorie:  SYNTHROID Take 1 Tab by mouth daily. Indications: hypothyroidism  
  
 lovastatin 20 mg tablet También conocido lorie:  MEVACOR Take 1 Tab by mouth Daily (before dinner). Indications: hyperlipidemia  
  
 metoprolol succinate 50 mg XL tablet También conocido lorie:  GXMSML-ZD Take 1 Tab by mouth daily. MSM-collag-co Q55-cajn no. 226 [phone] mg Tab Take 1 Tab by mouth daily. quinapril 40 mg tablet También conocido lorie:  Halle Letha Take 1 Tab by mouth daily. Indications: hypertension  
  
 therapeutic multivitamin tablet También conocido lorie:  [facility] Take 1 Tab by mouth daily. triamterene-hydroCHLOROthiazide 37.5-25 mg per capsule También conocido lorie:  Jocelynn Trisha Take 1 Cap by mouth daily. Indications: hypertension TYLENOL 325 mg tablet Medicamento genérico:  acetaminophen Take 325 mg by mouth every four (4) hours as needed for Pain.  
  
 verapamil  mg ER capsule También conocido lorie:  [place] Take 1 Cap by mouth daily. VITAMIN C 500 mg tablet Medicamento genérico:  ascorbic acid (vitamin C) Take 500 mg by mouth daily. Recetas Enviado a la [place] Refills  
 verapamil ER (VERELAN) 120 mg ER capsule 2 Sig: Take 1 Cap by mouth daily. Class: Normal  
 Pharmacy:  [address] [facility], [place] - [address] Ph #: [phone] Route: Oral  
  
Hicimos lo siguiente AMB POC EKG ROUTINE W/ 12 LEADS, INTER & REP [04820 CPT(R)] AMB POC URINE, MICROALBUMIN, SEMIQUANT (3 RESULTS) [23320 CPT(R)] Instrucciones de seguimiento Return in about 2 weeks (around 6/14/2018). Por hacer 05/31/2018 Lab:  METABOLIC PANEL, COMPREHENSIVE   
  
 05/31/2018 Lab:  T4, FREE   
  
 05/31/2018 Lab:  TSH 3RD GENERATION Instrucciones para el Paciente To Do: 
· Get your bloodwork done on your way out · Add your new blood pressure (verapamil) medication to your current 3 medications for your blood pressure · Only measure your BP once a day, after you have been seated and rested for at least 5 minutes · Bring your blood pressure machine in with you to your next visit Introducing [facility]! [facility] introduce portal paciente MyChart . Ahora se puede acceder a partes de yu expediente médico, enviar por correo electrónico la oficina de uy médico y solicitar renovaciones de medicamentos en línea. En yu navegador de Internet , Rohini Shoulder a [email or web address] Mahsa clic en el usuario por Jackelyn Sermon? Kane Sandovalas clic aquí en la sesión Luis Montelongo. Verá la página de registro [place]. Ingrese yu código de Bank  Marguerite gena y lorie aparece a continuación. Usted no tendrá que [employer] código después de anuj completado el proceso de registro . Si usted no se inscribe antes de la fecha de caducidad , debe solicitar un nuevo código. · MyChart Código de acceso : [id number] Expires: 8/6/2018  1:33 PM 
 
Ingresa los últimos cuatro dígitos de yu Número de Seguro Social ( xxxx ) y fecha de nacimiento ( dd / mm / aaaa ) lorie se indica y mahsa clic en Enviar. Usted será llevado a la siguiente página de registro . Crear un ID MyChart . Esta será yu ID de inicio de sesión de MyChart y no puede ser [place] , por lo que pensar en kylie que es Alonzo Vincenzo y fácil de recordar . Crear kylie contraseña MyChart . Usted puede cambiar yu contraseña en cualquier momento . Ingrese yu Password Reset de preguntas y [place] . [place] se puede utilizar en un momento posterior si usted olvida yu contraseña. Introduzca yu dirección de correo electrónico . Yani Eng recibirá kylie notificación por correo electrónico cuando la nueva información está disponible en MyChart . Amanda Rising clic en Registrarse.  Dianah Simmonds meghan y descargar porciones de yu expediente médico. 
 Mehrdad michelle en el enlace de descarga del menú Resumen para descargar kylie copia portátil de yu información médica . Si tiene [employer] , por favor visite la sección de preguntas frecuentes del sitio web MyChart . Recuerde, MyChart NO es que se utilizará para las necesidades urgentes. Para emergencias médicas , llame al 911 . Ahora disponible en yu iPhone y Android ! Por favor proporcione chel resumen de la documentación de cuidado a yu próximo proveedor. Your primary care clinician is listed as Ana Lee. If you have any questions after today's visit, please call [phone].

## 2018-05-31 NOTE — PATIENT INSTRUCTIONS
To Do:  · Get your bloodwork done on your way out  · Add your new blood pressure (verapamil) medication to your current 3 medications for your blood pressure  · Only measure your BP once a day, after you have been seated and rested for at least 5 minutes  · Bring your blood pressure machine in with you to your next visit

## 2018-05-31 NOTE — PROGRESS NOTES
[facility]  Primary Care Office Visit - Problem-Oriented    : 1940   Junito Stinson is a 69 y.o. female presenting for  Chief Complaint   Patient presents with    Hypertension       Assessment/Plan:     1. Asymptomatic hypertensive urgency  In the setting of   2. Essential hypertension  Not well controlled. Normal EKG. Previous normal renal PVL in . No microalbuminuria today. Add verapamil. Check labs. Monitor BP at home and bring in cuff for calibration @ 2wk f/u. - METABOLIC PANEL, COMPREHENSIVE; Future  - TSH 3RD GENERATION; Future  - T4, FREE; Future  - verapamil ER (VERELAN) 120 mg ER capsule; Take 1 Cap by mouth daily. Dispense: 30 Cap; Refill: 2  - AMB POC EKG ROUTINE W/ 12 LEADS, INTER & REP  - AMB POC URINE, MICROALBUMIN, SEMIQUANT (3 RESULTS)      3. Acquired hypothyroidism  Unclear control. Continue current levothyroxine for now (has been on steady dosing for quite a while now). - TSH 3RD GENERATION; Future  - T4, FREE; Future    4. Chronic ITP (idiopathic thrombocytopenia) (HCC)  Unclear control. Will send copy of labs to heme/onc.  - CBC WITH AUTOMATED DIFF; Future    5. Dutch speaking  [ entire visit conducted in [place] ]       Orders & Diagnoses associated with This Encounter:         ICD-10-CM ICD-9-CM   1. Asymptomatic hypertensive urgency I16.0 401.9   2. Essential hypertension I10 401.9   3. Acquired hypothyroidism E03.9 244.9   4.  Chronic ITP (idiopathic thrombocytopenia) (HCC) D69.3 287.31       Orders Placed This Encounter    METABOLIC PANEL, COMPREHENSIVE     Standing Status:   Future     Standing Expiration Date:   2019    TSH 3RD GENERATION     Standing Status:   Future     Standing Expiration Date:   2019    T4, FREE     Standing Status:   Future     Standing Expiration Date:   2019    CBC WITH AUTOMATED DIFF     Standing Status:   Future     Standing Expiration Date:   2019    CBC WITH AUTOMATED DIFF    METABOLIC PANEL, COMPREHENSIVE    T4, FREE    TSH 3RD GENERATION    AMB POC URINE, MICROALBUMIN, SEMIQUANT (3 RESULTS)    AMB POC EKG ROUTINE W/ 12 LEADS, INTER & REP     Order Specific Question:   Reason for Exam:     Answer:   hypertension    verapamil ER (VERELAN) 120 mg ER capsule     Sig: Take 1 Cap by mouth daily. Dispense:  30 Cap     Refill:  2         This document may have been created with the aid of dictation software. Text may contain errors, particularly phonetic errors. Reviewed management plan & instructions with patient, who voiced understanding. Edward Boyle MD  Internal Medicine, Family Medicine & Sports Medicine  5/31/2018, 10:00 AM    Patient Instructions     To Do:  · Get your bloodwork done on your way out  · Add your new blood pressure (verapamil) medication to your current 3 medications for your blood pressure  · Only measure your BP once a day, after you have been seated and rested for at least 5 minutes  · Bring your blood pressure machine in with you to your next visit        History:   Lory Quinteros is a 69 y.o. female presenting to address:  Chief Complaint   Patient presents with    Hypertension     Has been checking her BP at home regularly and states it is generally 150s-160s over 90s. Denies any caffeine, EtOH. Denies any new supplements, meds. Has been compliant with newly added metoprolol, in addition to her triamterene/HCTZ and her quinipril. States she has seen cardiology in the past for \"something like this, but they said that her heart was fine. \"  Is a less-than-ideal historian. Meant to bring in her home BP cuff and BP log, but forgot. Past Medical History:   Diagnosis Date    Acid indigestion     Essential hypertension, benign     History of EMG 12/2017    Normal study without electrical evidence of a focal / peripheral neuropathy of C5-T1 radiculopathy affecting the R arm.     Sinus problem     Unspecified hypothyroidism      Past Surgical History: Procedure Laterality Date    HX BACK SURGERY      disc    HX PARTIAL HYSTERECTOMY      HX SHOULDER ARTHROSCOPY Right 03/10/2017    subA decompression, distal clavicle excision, partial thickness cuff tear debridement      reports that she has quit smoking. Her smoking use included Cigarettes. She has never used smokeless tobacco. She reports that she drinks about 0.5 oz of alcohol per week  She reports that she does not use illicit drugs. Social History     Social History Narrative     History   Smoking Status    Former Smoker    Types: Cigarettes   Smokeless Tobacco    Never Used     Comment: quit 1980      Family History   Problem Relation Age of Onset   [facility] Stroke Mother     Hypertension Mother     Cancer Father     Cancer Sister     Cancer Brother      Allergies   Allergen Reactions    Amlodipine Swelling     Edema of feet/ankles    Amoxicillin-Pot Clavulanate Other (comments)     allopecia    Codeine Unknown (comments)    Levofloxacin Other (comments)     HAIR LOSS    Nitrofurantoin Macrocrystalline Other (comments)     Flu like symptoms    Penicillins Other (comments)     \"It makes me cold and hot\"    Sweat, itch    Sulfa (Sulfonamide Antibiotics) Nausea Only    Sulfamethoxazole-Trimethoprim Other (comments)       Problem List:      Patient Active Problem List    Diagnosis    Essential hypertension    Hyperlipidemia    Acquired hypothyroidism    Arthritis of right shoulder region     Mild/mod right GH & mod AC arthritis + RC tendinosis    MRI R shoulder (2/18/2017): Impression:  1. Moderate to severe supraspinatus tendinosis. Partial thickness tears at the undersurface and musculotendinous junction. Mild-to-moderate subacromial subdeltoid bursitis. 2. Moderate infraspinatus tendinosis. Question tiny partial thickness tear of infraspinatus tendon at its insertion. 3. Moderate osteoarthritis of the acromioclavicular joint with joint effusion.  Mild/moderate osteoarthritis of the glenohumeral joint.  Impingement syndrome of right shoulder    Chronic ITP (idiopathic thrombocytopenia) (HCC)     Followed by Dr. 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  Non-seasonal allergic rhinitis due to pollen    Osteopenia of multiple sites     DEXA 3/2018      Frontal sinus pain    Headache, chronic daily     - 3/14/2018 [ENT; Dr. Lin Dy: \"I in no way think that her headaches are sinus in origin. I suspect they are related to her known orthopedic issues. \"      DDD (degenerative disc disease), cervical    [place]an speaking patient    GERD (gastroesophageal reflux disease)    Primary insomnia    Sensorineural hearing loss, bilateral       Medications:     Current Outpatient Prescriptions   Medication Sig    metoprolol succinate (TOPROL-XL) 50 mg XL tablet Take 1 Tab by mouth daily.  fluticasone (FLONASE) 50 mcg/actuation nasal spray 2 Sprays by Both Nostrils route daily.  clindamycin (CLEOCIN) 150 mg capsule Take 1 Cap by mouth three (3) times daily.  levothyroxine (SYNTHROID) 88 mcg tablet Take 1 Tab by mouth daily. Indications: hypothyroidism    quinapril (ACCUPRIL) 40 mg tablet Take 1 Tab by mouth daily. Indications: hypertension    triamterene-hydroCHLOROthiazide (DYAZIDE) 37.5-25 mg per capsule Take 1 Cap by mouth daily. Indications: hypertension    lovastatin (MEVACOR) 20 mg tablet Take 1 Tab by mouth Daily (before dinner). Indications: hyperlipidemia    therapeutic multivitamin (THERAGRAN) tablet Take 1 Tab by mouth daily.  calcium-cholecalciferol, d3, (CALCIUM 600 + D) 600-125 mg-unit tab Take 1 Tab by mouth two (2) times a day.  ascorbic acid, vitamin C, (VITAMIN C) 500 mg tablet Take 500 mg by mouth daily.     DOCOSAHEXANOIC ACID/EPA (FISH OIL PO) Take 1 Cap by mouth daily.  MSM-Ticket ABC-co Q10-herb no. 226 [phone] mg tab Take 1 Tab by mouth daily.  b complex vitamins tablet Take 1 Tab by mouth daily.  acetaminophen (TYLENOL) 325 mg tablet Take 325 mg by mouth every four (4) hours as needed for Pain.  montelukast (SINGULAIR) 10 mg tablet Take 1 Tab by mouth daily.  Cetirizine (ZYRTEC) 10 mg cap Take 1 Cap by mouth daily. No current facility-administered medications for this visit. Review of Systems:     Review of Systems   Constitutional: Negative for chills and fever. HENT: Negative for ear pain and sore throat. Respiratory: Negative for cough and shortness of breath. Cardiovascular: Negative for chest pain and palpitations. Gastrointestinal: Negative for abdominal pain. Genitourinary: Negative for dysuria. Musculoskeletal: Negative for myalgias. Skin: Negative for rash. Neurological: Negative for speech change, focal weakness and headaches. Endo/Heme/Allergies: Does not bruise/bleed easily. Psychiatric/Behavioral: Negative for depression. The patient is not nervous/anxious and does not have insomnia. Physical Assessment:   VS:    Vitals:    05/31/18 0948 05/31/18 1012   BP: 141/75 162/90   Pulse: 62    Resp: 18    Temp: 96.4 °F (35.8 °C)    TempSrc: Oral    SpO2: 99%    Weight: 151 lb 9.6 oz (68.8 kg)    Height: 5' 6\" (1.676 m)    PainSc:   0 - No pain        Physical Exam   Constitutional: She is oriented to person, place, and time. She appears well-developed and well-nourished. HENT:   Head: Normocephalic and atraumatic. Eyes: EOM are normal.   Neck: Neck supple. No thyromegaly present. Cardiovascular: Normal rate, regular rhythm and intact distal pulses. No murmur heard. Pulmonary/Chest: Effort normal and breath sounds normal. She has no wheezes. She has no rales. Musculoskeletal: Normal range of motion. Neurological: She is alert and oriented to person, place, and time.  No cranial nerve deficit. Coordination normal.   Skin: Skin is warm and dry. She is not diaphoretic. Psychiatric: She has a normal mood and affect. Her behavior is normal. Judgment and thought content normal.   Nursing note reviewed. Recent Labs & Imaging:     EKG (5/31/2018):  SR, rate 61, no ST-T changes, normal axis    Renal artery PVL (6/9/2015): Bilateral: Both renal arteries, the renal veins, and both kidneys were evaluated with pulse wave Doppler and color flow duplex imaging. The kidneys were normal in size, and symmetric in longitudinal dimension.  By Doppler analysis, both renal arteries and the renal veins were patent and had a normal hemodynamic profile.  The renal to aortic systolic ratios and systolic rise times were within normal limits.  Renal parenchymal resistive indices were within normal limits.   Conclusions: Normal Renovascular Duplex exam.

## 2018-06-01 LAB
ALBUMIN SERPL-MCNC: 4.5 G/DL (ref 3.5–4.8)
ALBUMIN/GLOB SERPL: 1.6 {RATIO} (ref 1.2–2.2)
ALP SERPL-CCNC: 100 IU/L (ref 39–117)
ALT SERPL-CCNC: 21 IU/L (ref 0–32)
AST SERPL-CCNC: 25 IU/L (ref 0–40)
BASOPHILS # BLD AUTO: 0.1 X10E3/UL (ref 0–0.2)
BASOPHILS NFR BLD AUTO: 1 %
BILIRUB SERPL-MCNC: 0.4 MG/DL (ref 0–1.2)
BUN SERPL-MCNC: 25 MG/DL (ref 8–27)
BUN/CREAT SERPL: 29 (ref 12–28)
CALCIUM SERPL-MCNC: 10.1 MG/DL (ref 8.7–10.3)
CHLORIDE SERPL-SCNC: 101 MMOL/L (ref 96–106)
CO2 SERPL-SCNC: 24 MMOL/L (ref 18–29)
CREAT SERPL-MCNC: 0.87 MG/DL (ref 0.57–1)
EOSINOPHIL # BLD AUTO: 0.2 X10E3/UL (ref 0–0.4)
EOSINOPHIL NFR BLD AUTO: 3 %
ERYTHROCYTE [DISTWIDTH] IN BLOOD BY AUTOMATED COUNT: 13 % (ref 12.3–15.4)
GFR SERPLBLD CREATININE-BSD FMLA CKD-EPI: 65 ML/MIN/1.73
GFR SERPLBLD CREATININE-BSD FMLA CKD-EPI: 75 ML/MIN/1.73
GLOBULIN SER CALC-MCNC: 2.9 G/DL (ref 1.5–4.5)
GLUCOSE SERPL-MCNC: 85 MG/DL (ref 65–99)
HCT VFR BLD AUTO: 41.1 % (ref 34–46.6)
HGB BLD-MCNC: 13.2 G/DL (ref 11.1–15.9)
IMM GRANULOCYTES # BLD: 0 X10E3/UL (ref 0–0.1)
IMM GRANULOCYTES NFR BLD: 0 %
LYMPHOCYTES # BLD AUTO: 2.2 X10E3/UL (ref 0.7–3.1)
LYMPHOCYTES NFR BLD AUTO: 32 %
MCH RBC QN AUTO: 28.3 PG (ref 26.6–33)
MCHC RBC AUTO-ENTMCNC: 32.1 G/DL (ref 31.5–35.7)
MCV RBC AUTO: 88 FL (ref 79–97)
MONOCYTES # BLD AUTO: 0.6 X10E3/UL (ref 0.1–0.9)
MONOCYTES NFR BLD AUTO: 9 %
MORPHOLOGY BLD-IMP: ABNORMAL
NEUTROPHILS # BLD AUTO: 3.9 X10E3/UL (ref 1.4–7)
NEUTROPHILS NFR BLD AUTO: 55 %
PLATELET # BLD AUTO: 84 X10E3/UL (ref 150–379)
POTASSIUM SERPL-SCNC: 4.4 MMOL/L (ref 3.5–5.2)
PROT SERPL-MCNC: 7.4 G/DL (ref 6–8.5)
RBC # BLD AUTO: 4.67 X10E6/UL (ref 3.77–5.28)
SODIUM SERPL-SCNC: 142 MMOL/L (ref 134–144)
T4 FREE SERPL-MCNC: 1.75 NG/DL (ref 0.82–1.77)
TSH SERPL DL<=0.005 MIU/L-ACNC: 1.9 UIU/ML (ref 0.45–4.5)
WBC # BLD AUTO: 6.9 X10E3/UL (ref 3.4–10.8)

## 2018-06-02 NOTE — PROGRESS NOTES
Please call Johan Ward. \"Your platelet count is 84. Dr. Remedios Casanova sent these results over to Dr. Dania Fontaine. The rest of your bloodwork was normal, including thyroid and kidney function. \"    6/25/2018  1:00 PM    Tara Hopper MD       [address]    Thanks.

## 2018-06-11 PROBLEM — K63.5 POLYP OF ASCENDING COLON: Status: ACTIVE | Noted: 2018-06-11

## 2018-06-12 ENCOUNTER — TELEPHONE (OUTPATIENT)
Dept: FAMILY MEDICINE CLINIC | Age: 78
End: 2018-06-12

## 2018-06-12 DIAGNOSIS — J30.1 NON-SEASONAL ALLERGIC RHINITIS DUE TO POLLEN: Primary | ICD-10-CM

## 2018-06-12 RX ORDER — MINERAL OIL
180 ENEMA (ML) RECTAL
Qty: 90 TAB | Refills: 3 | Status: SHIPPED | OUTPATIENT
Start: 2018-06-12 | End: 2019-03-26 | Stop reason: SDUPTHER

## 2018-06-12 NOTE — TELEPHONE ENCOUNTER
Orders Placed This Encounter    fexofenadine (ALLEGRA) 180 mg tablet     Sig: Take 1 Tab by mouth daily as needed for Allergies.      Dispense:  90 Tab     Refill:  3

## 2018-06-12 NOTE — TELEPHONE ENCOUNTER
Patient is calling would like to have a 90 day supply for Allegra. Please send through the . [facility] delivery. Please call the patient once this has been complete.

## 2018-06-25 ENCOUNTER — OFFICE VISIT (OUTPATIENT)
Dept: FAMILY MEDICINE CLINIC | Age: 78
End: 2018-06-25

## 2018-06-25 VITALS
WEIGHT: 149.2 LBS | BODY MASS INDEX: 23.98 KG/M2 | DIASTOLIC BLOOD PRESSURE: 67 MMHG | HEIGHT: 66 IN | RESPIRATION RATE: 18 BRPM | OXYGEN SATURATION: 98 % | TEMPERATURE: 95.7 F | HEART RATE: 65 BPM | SYSTOLIC BLOOD PRESSURE: 125 MMHG

## 2018-06-25 DIAGNOSIS — E03.9 ACQUIRED HYPOTHYROIDISM: ICD-10-CM

## 2018-06-25 DIAGNOSIS — I10 ESSENTIAL HYPERTENSION: ICD-10-CM

## 2018-06-25 DIAGNOSIS — D69.3 CHRONIC ITP (IDIOPATHIC THROMBOCYTOPENIA) (HCC): Primary | Chronic | ICD-10-CM

## 2018-06-25 RX ORDER — VERAPAMIL HYDROCHLORIDE 120 MG/1
120 CAPSULE, EXTENDED RELEASE ORAL DAILY
Qty: 90 CAP | Refills: 2 | Status: SHIPPED | OUTPATIENT
Start: 2018-06-25 | End: 2018-10-18 | Stop reason: ALTCHOICE

## 2018-06-25 RX ORDER — METOPROLOL SUCCINATE 50 MG/1
50 TABLET, EXTENDED RELEASE ORAL DAILY
Qty: 90 TAB | Refills: 2 | Status: SHIPPED | OUTPATIENT
Start: 2018-06-25 | End: 2018-10-18 | Stop reason: ALTCHOICE

## 2018-06-25 NOTE — PROGRESS NOTES
Eric Strickland is a 69 y.o. female (: 1940) presenting to address:    Chief Complaint   Patient presents with    Hypertension       Vitals:    18 1304   BP: 125/67   Pulse: 65   Resp: 18   Temp: 95.7 °F (35.4 °C)   TempSrc: Oral   SpO2: 98%   Weight: 149 lb 3.2 oz (67.7 kg)   Height: 5' 6\" (1.676 m)   PainSc:   0 - No pain       Hearing/Vision:   No exam data present    Learning Assessment:     Learning Assessment 2018   PRIMARY LEARNER Patient   HIGHEST LEVEL OF EDUCATION - PRIMARY LEARNER  DID NOT GRADUATE HIGH SCHOOL   BARRIERS PRIMARY LEARNER LANGUAGE   CO-LEARNER CAREGIVER Yes   CO-LEARNER NAME daughter   Kari Duke LEVEL OF EDUCATION 4 YEARS OF COLLEGE   BARRIERS CO-LEARNER NONE   PRIMARY LANGUAGE Urdu   PRIMARY LANGUAGE CO-LEARNER ENGLISH    NEED No   LEARNER PREFERENCE PRIMARY LISTENING   LEARNER PREFERENCE CO-LEARNER LISTENING   LEARNING SPECIAL TOPICS no   ANSWERED BY self   RELATIONSHIP SELF     Depression Screening:     PHQ over the last two weeks 2018   Little interest or pleasure in doing things Not at all   Feeling down, depressed or hopeless Not at all   Total Score PHQ 2 0     Fall Risk Assessment:     Fall Risk Assessment, last 12 mths 2018   Able to walk? Yes   Fall in past 12 months? No     Abuse Screening:     Abuse Screening Questionnaire 2018   Do you ever feel afraid of your partner? N   Are you in a relationship with someone who physically or mentally threatens you? N   Is it safe for you to go home? Y     Coordination of Care Questionaire:   1. Have you been to the ER, urgent care clinic since your last visit? Hospitalized since your last visit? NO    2. Have you seen or consulted any other health care providers outside of the [address] [place] [address] since your last visit? Include any pap smears or colon screening. NO    Advanced Directive:   1. Do you have an Advanced Directive? YES ON FILE    2.  Would you like information on Advanced Directives?  NO

## 2018-06-25 NOTE — PROGRESS NOTES
3 [facility]  Primary Care Office Visit - Problem-Oriented    : 1940   Ethan Beltre is a 69 y.o. female presenting for  Chief Complaint   Patient presents with    Hypertension       Assessment/Plan:     1. Essential hypertension  Now well controlled. No change to current regimen. - metoprolol succinate (TOPROL-XL) 50 mg XL tablet; Take 1 Tab by mouth daily. Dispense: 90 Tab; Refill: 2  - verapamil ER (VERELAN) 120 mg ER capsule; Take 1 Cap by mouth daily. Dispense: 90 Cap; Refill: 2  - METABOLIC PANEL, COMPREHENSIVE; Future  [place] CAD CHF Meds             metoprolol succinate (TOPROL-XL) 50 mg XL tablet  (Taking) Take 1 Tab by mouth daily. verapamil ER (VERELAN) 120 mg ER capsule  (Taking) Take 1 Cap by mouth daily. quinapril (ACCUPRIL) 40 mg tablet  (Taking) Take 1 Tab by mouth daily. Indications: hypertension    triamterene-hydroCHLOROthiazide (DYAZIDE) 37.5-25 mg per capsule  (Taking) Take 1 Cap by mouth daily. Indications: hypertension    lovastatin (MEVACOR) 20 mg tablet  (Taking) Take 1 Tab by mouth Daily (before dinner). Indications: hyperlipidemia    DOCOSAHEXANOIC ACID/EPA (FISH OIL PO)  (Taking) Take 1 Cap by mouth daily. 2. Chronic ITP (idiopathic thrombocytopenia) (HCC)  Followed by heme/onc.  - CBC WITH AUTOMATED DIFF; Future    3. Acquired hypothyroidism  Stable. No change to current dosing.  - T4, FREE; Future  - TSH 3RD GENERATION; Future    Orders & Diagnoses associated with This Encounter:         ICD-10-CM ICD-9-CM   1. Chronic ITP (idiopathic thrombocytopenia) (HCC) D69.3 287.31   2. Essential hypertension I10 401.9   3.  Acquired hypothyroidism E03.9 244.9       Orders Placed This Encounter    CBC WITH AUTOMATED DIFF     Standing Status:   Future     Standing Expiration Date:       METABOLIC PANEL, COMPREHENSIVE     Standing Status:   Future     Standing Expiration Date:   2019    T4, FREE     Standing Status:   Future     Standing Expiration Date:   2/25/2019    TSH 3RD GENERATION     Standing Status:   Future     Standing Expiration Date:   2/25/2019    metoprolol succinate (TOPROL-XL) 50 mg XL tablet     Sig: Take 1 Tab by mouth daily. Dispense:  90 Tab     Refill:  2    verapamil ER (VERELAN) 120 mg ER capsule     Sig: Take 1 Cap by mouth daily. Dispense:  90 Cap     Refill:  2         This document may have been created with the aid of dictation software. Text may contain errors, particularly phonetic errors. Reviewed management plan & instructions with patient, who voiced understanding. Edward Boyle MD  Internal Medicine, Family Medicine & Sports Medicine  6/25/2018, 12:51 PM    History:   Lory Quinteros is a 69 y.o. female presenting to address:  Chief Complaint   Patient presents with    Hypertension       Taking all her medications as directed. Denies any AE. Brings her home BP machine in for comparison with manual.  Her machine reads 8-10 points HIGHER than a manual reading. Past Medical History:   Diagnosis Date    Acid indigestion     Essential hypertension, benign     History of EMG 12/2017    Normal study without electrical evidence of a focal / peripheral neuropathy of C5-T1 radiculopathy affecting the R arm.  Sinus problem     Unspecified hypothyroidism      Past Surgical History:   Procedure Laterality Date    HX BACK SURGERY      disc    HX PARTIAL HYSTERECTOMY      HX SHOULDER ARTHROSCOPY Right 03/10/2017    subA decompression, distal clavicle excision, partial thickness cuff tear debridement      reports that she has quit smoking. Her smoking use included Cigarettes. She has never used smokeless tobacco. She reports that she drinks about 0.5 oz of alcohol per week  She reports that she does not use illicit drugs.   Social History     Social History Narrative     History   Smoking Status    Former Smoker    Types: Cigarettes   Smokeless Tobacco    Never Used     Comment: quit 1980      Family History   Problem Relation Age of Onset   [facility] St[facility] Mother     Hypertension Mother     Cancer Father     Cancer Sister     Cancer Brother      Allergies   Allergen Reactions    Amlodipine Swelling     Edema of feet/ankles    Amoxicillin-Pot Clavulanate Other (comments)     allopecia    Codeine Unknown (comments)    Levofloxacin Other (comments)     HAIR LOSS    Nitrofurantoin Macrocrystalline Other (comments)     Flu like symptoms    Penicillins Other (comments)     \"It makes me cold and hot\"    Sweat, itch    Sulfa (Sulfonamide Antibiotics) Nausea Only    Sulfamethoxazole-Trimethoprim Other (comments)       Problem List:      Patient Active Problem List    Diagnosis    Essential hypertension    Hyperlipidemia    Acquired hypothyroidism    Arthritis of right shoulder region     Mild/mod right GH & mod AC arthritis + RC tendinosis    MRI R shoulder (2/18/2017): Impression:  1. Moderate to severe supraspinatus tendinosis. Partial thickness tears at the undersurface and musculotendinous junction. Mild-to-moderate subacromial subdeltoid bursitis. 2. Moderate infraspinatus tendinosis. Question tiny partial thickness tear of infraspinatus tendon at its insertion. 3. Moderate osteoarthritis of the acromioclavicular joint with joint effusion. Mild/moderate osteoarthritis of the glenohumeral joint.  Impingement syndrome of right shoulder    Chronic ITP (idiopathic thrombocytopenia) (HCC)     Followed by Dr. 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  Polyp of ascending colon     On 6/11/2018 c-scope. Biopsy pending.       Non-seasonal allergic rhinitis due to pollen    Osteopenia of multiple sites     DEXA 3/2018      Frontal sinus pain    Headache, chronic daily     - 3/14/2018 [ENT; Dr. Colunga Katty: \"I in no way think that her headaches are sinus in origin. I suspect they are related to her known orthopedic issues. \"      DDD (degenerative disc disease), cervical    Mongolian speaking patient    GERD (gastroesophageal reflux disease)    Primary insomnia    Sensorineural hearing loss, bilateral       Medications:     Current Outpatient Prescriptions   Medication Sig    metoprolol succinate (TOPROL-XL) 50 mg XL tablet Take 1 Tab by mouth daily.  verapamil ER (VERELAN) 120 mg ER capsule Take 1 Cap by mouth daily.  fexofenadine (ALLEGRA) 180 mg tablet Take 1 Tab by mouth daily as needed for Allergies.  fluticasone (FLONASE) 50 mcg/actuation nasal spray 2 Sprays by Both Nostrils route daily. (Patient taking differently: 2 Sprays by Both Nostrils route daily as needed.)    clindamycin (CLEOCIN) 150 mg capsule Take 1 Cap by mouth three (3) times daily.  levothyroxine (SYNTHROID) 88 mcg tablet Take 1 Tab by mouth daily. Indications: hypothyroidism    quinapril (ACCUPRIL) 40 mg tablet Take 1 Tab by mouth daily. Indications: hypertension    triamterene-hydroCHLOROthiazide (DYAZIDE) 37.5-25 mg per capsule Take 1 Cap by mouth daily. Indications: hypertension    lovastatin (MEVACOR) 20 mg tablet Take 1 Tab by mouth Daily (before dinner). Indications: hyperlipidemia    therapeutic multivitamin (THERAGRAN) tablet Take 1 Tab by mouth daily.  calcium-cholecalciferol, d3, (CALCIUM 600 + D) 600-125 mg-unit tab Take 1 Tab by mouth two (2) times a day.  ascorbic acid, vitamin C, (VITAMIN C) 500 mg tablet Take 500 mg by mouth daily.  DOCOSAHEXANOIC ACID/EPA (FISH OIL PO) Take 1 Cap by mouth daily.  MSM-Wix-co Q10-herb no. [phone] mg tab Take 1 Tab by mouth daily.  b complex vitamins tablet Take 1 Tab by mouth daily.  acetaminophen (TYLENOL) 325 mg tablet Take 325 mg by mouth every four (4) hours as needed for Pain.      No current facility-administered medications for this visit. Review of Systems:     Review of Systems   Constitutional: Negative for chills and fever. HENT: Negative for ear pain and sore throat. Respiratory: Negative for cough and shortness of breath. Cardiovascular: Negative for chest pain and palpitations. Gastrointestinal: Negative for abdominal pain. Genitourinary: Negative for dysuria. Musculoskeletal: Negative for myalgias. Skin: Negative for rash. Neurological: Negative for speech change, focal weakness and headaches. Endo/Heme/Allergies: Does not bruise/bleed easily. Psychiatric/Behavioral: Negative for depression. The patient is not nervous/anxious and does not have insomnia. Physical Assessment:   VS:    Vitals:    06/25/18 1304   BP: 125/67   Pulse: 65   Resp: 18   Temp: 95.7 °F (35.4 °C)   TempSrc: Oral   SpO2: 98%   Weight: 149 lb 3.2 oz (67.7 kg)   Height: 5' 6\" (1.676 m)   PainSc:   0 - No pain       Physical Exam   Constitutional: She is oriented to person, place, and time. She appears well-developed and well-nourished. HENT:   Head: Normocephalic and atraumatic. Eyes: EOM are normal.   Neck: Neck supple. No thyromegaly present. Cardiovascular: Normal rate, regular rhythm and intact distal pulses. No murmur heard. Pulmonary/Chest: Effort normal and breath sounds normal. She has no wheezes. She has no rales. Musculoskeletal: Normal range of motion. Neurological: She is alert and oriented to person, place, and time. No cranial nerve deficit. Coordination normal.   Skin: Skin is warm and dry. She is not diaphoretic. Psychiatric: She has a normal mood and affect. Her behavior is normal. Judgment and thought content normal.   Nursing note reviewed.

## 2018-06-25 NOTE — MR AVS SNAPSHOT
[address] [facility] [address] 
 
 
 [address] [facility] [id number] [phone] Patient: Sesar Lopez MRN: [id number] [id number] Visit Information Drena Pain y [place] Personal Médico Departamento Teléfono del Dep. Número de visita 6/25/2018  1:00 PM Rachel Armando [phone] Follow-up Instructions Return for 30min appt - [place]. Your Appointments 10/22/2018  1:30 PM  
Follow Up with Nicki Jordan MD  
[address] [facility] Appt Note: medication f/u  
 [address] [facility] [address]  
[phone]  
  
   
 [address] Upcoming Health Maintenance Date Due DTaP/Tdap/Td series (1 - Tdap) 6/5/1962 ZOSTER VACCINE AGE 60> 4/5/2001 GLAUCOMA SCREENING Q2Y 6/5/2006 Influenza Age 5 to Adult 8/1/2018 MEDICARE YEARLY EXAM 2/22/2019 Alergias  Review Complete El: 6/25/2018 Por: Saba Mejía LPN A partir del:  6/25/2018 Intensidad Anotado Tipo de reacción [employer] Amlodipine  01/08/2016    Swelling Edema of feet/ankles Amoxicillin-pot Clavulanate  02/25/2014    Other (comments)  
 allopecia Codeine  06/02/2010    Unknown (comments) Levofloxacin  03/03/2016    Other (comments) HAIR LOSS Nitrofurantoin Macrocrystalline  02/04/2014    Other (comments) Flu like symptoms Penicillins  08/18/2012    Other (comments) \"It makes me cold and hot\" Sweat, itch  
 Sulfa (Sulfonamide Antibiotics)  06/02/2010    Nausea Only Sulfamethoxazole-trimethoprim  02/11/2003    Other (comments) Vacunas actuales Revisadas el:  1/23/2018 Roxane Larsen Influenza High Dose Vaccine PF 10/2/2017 Influenza Vaccine 11/2/2015 12:00 AM, 10/29/2013 12:00 AM  
 Influenza Vaccine Split 12/9/2010 Pneumococcal Conjugate (PCV-13) 11/2/2015 12:00 AM  
 Pneumococcal Polysaccharide (PPSV-23) 11/10/2009, 10/31/2005 No revisadas esta visita You Were Diagnosed With   
  
 Christina Guido Essential hypertension     ICD-10-CM: I10 
ICD-9-CM: 401.9 Partes vitales PS Pulso Temperatura Resp [place] ( percentil de crecimiento) Peso (percentil de crecimiento) 125/67 (BP 1 Location: Right arm, BP Patient Position: Sitting) 65 95.7 °F (35.4 °C) (Oral) 18 5' 6\" (1.676 m) 149 lb 3.2 oz (67.7 kg) LMP (última chidi) SpO2 BMI ([facility]) Estado obstétrico Estatus de tabaquísmo (LMP Unknown) 98% 24.08 kg/m2 Hysterectomy Former Smoker Historial de signos vitales BMI and BSA Data Body Mass Index Body Surface Area 24.08 kg/m 2 1.78 m 2 Radha Nolasco Pharmacy Name Phone [place] [phone] [phone] Osei lista de medicamentos actualizada Lista actualizada 6/25/18  1:21 PM.  Siempre use osei lista de medicamentos más reciente. b complex vitamins tablet Take 1 Tab by mouth daily. CALCIUM 600 + D 600-125 mg-unit Tab Medicamento genérico:  calcium-cholecalciferol (d3) Take 1 Tab by mouth two (2) times a day. clindamycin 150 mg capsule También conocido lorie:  CLEOCIN Take 1 Cap by mouth three (3) times daily. fexofenadine 180 mg tablet También conocido lorie: Kenith Fitting Take 1 Tab by mouth daily as needed for Allergies. FISH OIL PO Take 1 Cap by mouth daily. fluticasone 50 mcg/actuation nasal spray También conocido lorie:  FLONASE 2 Sprays by Both Nostrils route daily. levothyroxine 88 mcg tablet También conocido olrie:  SYNTHROID Take 1 Tab by mouth daily. Indications: hypothyroidism  
  
 lovastatin 20 mg tablet También conocido lorie:  MEVACOR Take 1 Tab by mouth Daily (before dinner). Indications: hyperlipidemia metoprolol succinate 50 mg XL tablet También conocido lorie:  NNDQEM-FY Take 1 Tab by mouth daily. MSM-collag-co F25-osde no. 226 [phone] mg Tab Take 1 Tab by mouth daily. quinapril 40 mg tablet También conocido lorie:  Castalia Frees Take 1 Tab by mouth daily. Indications: hypertension  
  
 therapeutic multivitamin tablet También conocido lorie:  [facility] Take 1 Tab by mouth daily. triamterene-hydroCHLOROthiazide 37.5-25 mg per capsule También conocido lorie:  Kallie Rosin Take 1 Cap by mouth daily. Indications: hypertension TYLENOL 325 mg tablet Medicamento genérico:  acetaminophen Take 325 mg by mouth every four (4) hours as needed for Pain.  
  
 verapamil  mg ER capsule También conocido lorie:  [employer] Take 1 Cap by mouth daily. VITAMIN C 500 mg tablet Medicamento genérico:  ascorbic acid (vitamin C) Take 500 mg by mouth daily. Recetas Enviado a la Raul Refills  
 metoprolol succinate (TOPROL-XL) 50 mg XL tablet 2 Sig: Take 1 Tab by mouth daily. Class: Normal  
 Pharmacy: [address] [place] [address] Ph #: [phone] Route: Oral  
 verapamil ER (VERELAN) 120 mg ER capsule 2 Sig: Take 1 Cap by mouth daily. Class: Normal  
 Pharmacy: [address] [place] [address] Ph #: [phone] Route: Oral  
  
Instrucciones de seguimiento Return for 30min appt - [place]. Introducing [facility]! [facility] introduce portal paciente MyChart . Ahora se puede acceder a partes de yu expediente médico, enviar por correo electrónico la oficina de yu médico y solicitar renovaciones de medicamentos en línea. En yu navegador de Internet , Ronak umanzor [email or web address] Mehrdad clic en el usuario por Tan Hackett? Ortega Pepper clic aquí en la sesión [facility]. Verá la página de registro [place]. Ingrese yu código de [employer] gena y lorie aparece a continuación. Usted no tendrá que [employer] código después de anuj completado el proceso de registro . Si usted no se inscribe antes de la fecha de caducidad , debe solicitar un nuevo código. · MyChart Código de acceso : [id number] Expires: 8/6/2018  1:33 PM 
 
Ingresa los últimos cuatro dígitos de yu Número de Seguro Social ( xxxx ) y fecha de nacimiento ( dd / mm / aaaa ) lorie se indica y mehrdad clic en Enviar. Usted será llevado a la siguiente página de registro . Crear un ID MyChart . Esta será yu ID de inicio de sesión de MyChart y no puede ser [place] , por lo que pensar en kylie que es Deneise Pea y fácil de recordar . Crear kylie contraseña MyChart . Usted puede cambiar yu contraseña en cualquier momento . Ingrese yu Password Reset de preguntas y Jeffery . Corley se puede utilizar en un momento posterior si usted olvida yu contraseña. Introduzca yu dirección de correo electrónico . Crystal Maidens recibirá kylie notificación por correo electrónico cuando la nueva información está disponible en MyChart . Felicie Abler clic en Registrarse. Magdamarry Ing meghan y descargar porciones de yu expediente médico. 
Mehrdad clic en el enlace de descarga del menú Resumen para descargar kylie copia portátil de yu información médica . Si tiene [employer] , por favor visite la sección de preguntas frecuentes del sitio web MyChart . Recuerde, MyChart NO es que se utilizará para las necesidades urgentes. Para emergencias médicas , llame al 911 . Ahora disponible en yu iPhone y Android ! Por favor proporcione chel resumen de la documentación de cuidado a yu próximo proveedor. Your primary care clinician is listed as Jennifer Mckay. If you have any questions after today's visit, please call [phone].

## 2018-07-13 NOTE — TELEPHONE ENCOUNTER
Pt called to request a refill on her heartburn medication (pt did not know the name of her medication) She states that she thought she had a refill left but it has already . Please advise.

## 2018-07-16 RX ORDER — OMEPRAZOLE 40 MG/1
40 CAPSULE, DELAYED RELEASE ORAL DAILY
Qty: 90 CAP | Refills: 3 | Status: SHIPPED | OUTPATIENT
Start: 2018-07-16 | End: 2019-03-26 | Stop reason: SDUPTHER

## 2018-07-16 NOTE — TELEPHONE ENCOUNTER
Spoke to patient she states she needs a refill on Omeprazole 40 mg take 1 tablet daily    Last seen 6/25/18    Last filled: I don't see that we have filled this before.

## 2018-07-16 NOTE — TELEPHONE ENCOUNTER
Left message for patient to return call to find out the name of the medication needed since I don't seen anything in her medication list for heartburn.

## 2018-07-25 ENCOUNTER — OFFICE VISIT (OUTPATIENT)
Dept: FAMILY MEDICINE CLINIC | Age: 78
End: 2018-07-25

## 2018-07-25 VITALS
SYSTOLIC BLOOD PRESSURE: 136 MMHG | HEIGHT: 66 IN | RESPIRATION RATE: 18 BRPM | HEART RATE: 72 BPM | TEMPERATURE: 95.8 F | OXYGEN SATURATION: 96 % | WEIGHT: 154.8 LBS | DIASTOLIC BLOOD PRESSURE: 84 MMHG | BODY MASS INDEX: 24.88 KG/M2

## 2018-07-25 DIAGNOSIS — Z78.9 MEDICATION INTOLERANCE: ICD-10-CM

## 2018-07-25 DIAGNOSIS — J30.1 NON-SEASONAL ALLERGIC RHINITIS DUE TO POLLEN: ICD-10-CM

## 2018-07-25 DIAGNOSIS — R53.83 FATIGUE, UNSPECIFIED TYPE: ICD-10-CM

## 2018-07-25 DIAGNOSIS — I10 ESSENTIAL HYPERTENSION: Primary | ICD-10-CM

## 2018-07-25 DIAGNOSIS — H92.02 LEFT EAR PAIN: ICD-10-CM

## 2018-07-25 RX ORDER — MONTELUKAST SODIUM 10 MG/1
10 TABLET ORAL DAILY
COMMUNITY
End: 2018-08-15 | Stop reason: SDUPTHER

## 2018-07-25 RX ORDER — FLUTICASONE PROPIONATE 50 MCG
2 SPRAY, SUSPENSION (ML) NASAL DAILY
Qty: 3 BOTTLE | Refills: 3 | Status: SHIPPED | OUTPATIENT
Start: 2018-07-25 | End: 2019-03-26 | Stop reason: SDUPTHER

## 2018-07-25 RX ORDER — NEOMYCIN SULFATE, POLYMYXIN B SULFATE AND HYDROCORTISONE 10; 3.5; 1 MG/ML; MG/ML; [USP'U]/ML
3 SUSPENSION/ DROPS AURICULAR (OTIC) 4 TIMES DAILY
Qty: 10 ML | Refills: 0 | Status: SHIPPED | OUTPATIENT
Start: 2018-07-25 | End: 2018-08-01

## 2018-07-25 NOTE — PATIENT INSTRUCTIONS
To Do:  · Call the cardiology office to follow-up on your \"labile hypertension\", as I would like their expert opinion on what medications you COULD use if the decision is made to stop the verapamil due to the side effects of flu-like symptoms  · Start your ear drops for your ear infection on the left as soon as possible      Notes from your doctor:  · Read below re: hernia (which is something you MIGHT have with re: to your intermittent left abdominal bulging)      [facility]   [address] [facility]   [place], [address] [facility]   [phone]            Hernia: Care Instructions  Your Care Instructions    A hernia develops when tissue bulges through a weak spot in the wall of your belly. The groin area and the navel are common areas for a hernia. A hernia can also develop near the area of a surgery you had before. Pressure from lifting, straining, or coughing can tear the weak area, causing the hernia to bulge and be painful. If you cannot push a hernia back into place, the tissue may become trapped outside the belly wall. If the hernia gets twisted and loses its blood supply, it will swell and die. This is called a strangulated hernia. It usually causes a lot of pain. It needs treatment right away. Some hernias need to be repaired to prevent a strangulated hernia. If your hernia causes symptoms or is large, you may need surgery. Follow-up care is a key part of your treatment and safety. Be sure to make and go to all appointments, and call your doctor if you are having problems. It's also a good idea to know your test results and keep a list of the medicines you take. How can you care for yourself at home? · Take care when lifting heavy objects. · Stay at a healthy weight. · Do not smoke. Smoking can cause coughing, which can cause your hernia to bulge. If you need help quitting, talk to your doctor about stop-smoking programs and medicines.  These can increase your chances of quitting for good. · Talk with your doctor before wearing a corset or truss for a hernia. These devices are not recommended for treating hernias and sometimes can do more harm than good. There may be certain situations when your doctor thinks a truss would work, but these are rare. When should you call for help? Call your doctor now or seek immediate medical care if:    · You have new or worse belly pain.     · You are vomiting.     · You cannot pass stools or gas.     · You cannot push the hernia back into place with gentle pressure when you are lying down.     · The area over the hernia turns red or becomes tender.    Watch closely for changes in your health, and be sure to contact your doctor if you have any problems. Where can you learn more? Go to http://jalyn-taco.info/. Enter C129 in the search box to learn more about \"Hernia: Care Instructions. \"  Current as of: May 12, 2017  Content Version: 11.7  © 1394-7856 [employer], [employer]. Care instructions adapted under license by [employer] (which disclaims liability or warranty for this information). If you have questions about a medical condition or this instruction, always ask your healthcare professional. [address] any warranty or liability for your use of this information.

## 2018-07-25 NOTE — PROGRESS NOTES
Radha Yee is a 69 y.o. female (: 1940) presenting to address:    Chief Complaint   Patient presents with    Headache    Fatigue    Shortness of Breath    Ear Pain     left ear    Skin Problem    Possible Hernia     lower left mass pops out    Medication Reaction       Vitals:    18 1023   BP: 148/73   Pulse: 72   Resp: 18   Temp: 95.8 °F (35.4 °C)   TempSrc: Oral   SpO2: 96%   Weight: 154 lb 12.8 oz (70.2 kg)   Height: 5' 6\" (1.676 m)   PainSc:   0 - No pain       Hearing/Vision:   No exam data present    Learning Assessment:     Learning Assessment 2018   PRIMARY LEARNER Patient   HIGHEST LEVEL OF EDUCATION - PRIMARY LEARNER  DID NOT GRADUATE HIGH SCHOOL   BARRIERS PRIMARY LEARNER LANGUAGE   CO-LEARNER CAREGIVER Yes   CO-LEARNER NAME daughter   Kaylee Common LEVEL OF EDUCATION 4 YEARS OF COLLEGE   BARRIERS CO-LEARNER NONE   PRIMARY LANGUAGE Latvian   PRIMARY LANGUAGE CO-LEARNER ENGLISH    NEED No   LEARNER PREFERENCE PRIMARY LISTENING   LEARNER PREFERENCE CO-LEARNER LISTENING   LEARNING SPECIAL TOPICS no   ANSWERED BY self   RELATIONSHIP SELF     Depression Screening:     PHQ over the last two weeks 2018   Little interest or pleasure in doing things Not at all   Feeling down, depressed, irritable, or hopeless Not at all   Total Score PHQ 2 0     Fall Risk Assessment:     Fall Risk Assessment, last 12 mths 2018   Able to walk? Yes   Fall in past 12 months? No     Abuse Screening:     Abuse Screening Questionnaire 2018   Do you ever feel afraid of your partner? N   Are you in a relationship with someone who physically or mentally threatens you? N   Is it safe for you to go home? Y     Coordination of Care Questionaire:   1. Have you been to the ER, urgent care clinic since your last visit? Hospitalized since your last visit? NO    2.  Have you seen or consulted any other health care providers outside of the [address] [place] [address] since your last visit? Include any pap smears or colon screening. NO    Advanced Directive:   1. Do you have an Advanced Directive? YES On file    2. Would you like information on Advanced Directives?  NO

## 2018-07-25 NOTE — PROGRESS NOTES
[facility]  Primary Care Office Visit - Problem-Oriented    : 1940   Skeeter Hodgkins is a 69 y.o. female presenting for  Chief Complaint   Patient presents with    Headache    Fatigue    Shortness of Breath    Ear Pain     left ear    Skin Problem    Possible Hernia     lower left mass pops out    Medication Reaction       Assessment/Plan:     1. Essential hypertension  Currently well controlled, however Skeeter Hodgkins notes   2. Fatigue, unspecified type  Since started on verapamil. Daughter notes history of   3. Medication intolerance  To verapamil, and they both request that the medication be changed. Reviewed with them that Skeeter Hodgkins has seen cardiology in the past (neither of them recalled the details of this), and was noted to have quite significantly labile BP. Also reviewed negative relatively advanced studies done by cardiology in the past to investigate BP futher, including renal PVLs. Also reviewed current regimen which is a combination of 5 different antihypertensives. Answered the questions re: limitations re: increasing some of the other medications, and discussed the approach of avoiding max dosing of meds to minimize risk of AE with diminishing therapeutic benefit. Both Myra 22 her daughter are amenable to returning to cardiology for further recommendations and remaining of verapamil for now, despite the annoying but not dangerous symptoms that they attribute to this medication. Key CAD CHF Meds             metoprolol succinate (TOPROL-XL) 50 mg XL tablet  (Taking) Take 1 Tab by mouth daily. verapamil ER (VERELAN) 120 mg ER capsule  (Taking) Take 1 Cap by mouth daily. quinapril (ACCUPRIL) 40 mg tablet  (Taking) Take 1 Tab by mouth daily. Indications: hypertension    triamterene-hydroCHLOROthiazide (DYAZIDE) 37.5-25 mg per capsule  (Taking) Take 1 Cap by mouth daily. Indications: hypertension            4.  Left ear pain  New issue.  Probable subacute OM. - neomycin-polymyxin-hydrocortisone, buffered, (PEDIOTIC) 3.5-10,000-1 mg/mL-unit/mL-% otic suspension; Administer 3 Drops in left ear four (4) times daily for 7 days. Dispense: 10 mL; Refill: 0    5. Non-seasonal allergic rhinitis due to pollen  Ongoing. Chronic meds sent to mail order pharmacy per patient request.  - montelukast (SINGULAIR) 10 mg tablet; Take 10 mg by mouth daily. - fluticasone (FLONASE) 50 mcg/actuation nasal spray; 2 Sprays by Both Nostrils route daily. Indications: CHRONIC NON-ALLERGIC RHINITIS  Dispense: 3 Bottle; Refill: 3        Orders & Diagnoses associated with This Encounter:         ICD-10-CM ICD-9-CM   1. Essential hypertension I10 401.9   2. Fatigue, unspecified type R53.83 780.79   3. Medication intolerance Z78.9 995.27   4. Left ear pain H92.02 388.70   5. Non-seasonal allergic rhinitis due to pollen J30.1 477.0       Orders Placed This Encounter    fluticasone (FLONASE) 50 mcg/actuation nasal spray     Si Sprays by Both Nostrils route daily. Indications: CHRONIC NON-ALLERGIC RHINITIS     Dispense:  3 Bottle     Refill:  3    neomycin-polymyxin-hydrocortisone, buffered, (PEDIOTIC) 3.5-10,000-1 mg/mL-unit/mL-% otic suspension     Sig: Administer 3 Drops in left ear four (4) times daily for 7 days. Dispense:  10 mL     Refill:  0     Spent 40min face-to-face, >50% spent on counseling & patient education. This document may have been created with the aid of dictation software. Text may contain errors, particularly phonetic errors. Reviewed management plan & instructions with patient, who voiced understanding. Alyssa Adame MD  Internal Medicine, Family Medicine & Sports Medicine  2018, 10:40 AM    Patient Instructions (provided in AVS):        To Do:  · Call the cardiology office to follow-up on your \"labile hypertension\", as I would like their expert opinion on what medications you COULD use if the decision is made to stop the verapamil due to the side effects of flu-like symptoms  · Start your ear drops for your ear infection on the left as soon as possible      Notes from your doctor:  · Read below re: hernia (which is something you MIGHT have with re: to your intermittent left abdominal bulging)    Hernia: Care Instructions    History:   Cristiano Johnson is a 69 y.o. female presenting to address:  Chief Complaint   Patient presents with    Headache    Fatigue    Shortness of Breath    Ear Pain     left ear    Skin Problem    Possible Hernia     lower left mass pops out    Medication Reaction     Here with daughter.  (of note, daughter did not accompany her mother for the last few visits). Per Daughter:    \"I have paperwork saying that verapamil with flu-like symptoms. She has been complaining of energy, shortness of breath, itchy since she started the medicines\"  \"She is also complaining about some bulge in her lower abdomen. \"    Per Natahsa Ravi Ickert:    Endorses the symptoms mentioned by daughter above. Although she does state that these symptoms do not prevent her from doing her usual amt of exercise, nor any of her normal routine. Notes that her BP has been well controlled at home using her machine (knowing that her machine does read 8-9 points higher than a manual reading). Reports that she is not too concerned about the abdomen complaint as it doesn't cause any pain, and she's more concerned about her BP meds. Does note some pain in the L ear. Is not currently taking several of her allergy medications due to \"being out of them\". Requests that they be refilled via [employer]. Past Medical History:   Diagnosis Date    Acid indigestion     Essential hypertension, benign     History of EMG 12/2017    Normal study without electrical evidence of a focal / peripheral neuropathy of C5-T1 radiculopathy affecting the R arm.     Sinus problem     Unspecified hypothyroidism      Past Surgical History:   Procedure Laterality Date    HX BACK SURGERY      disc    HX PARTIAL HYSTERECTOMY      HX SHOULDER ARTHROSCOPY Right 03/10/2017    subA decompression, distal clavicle excision, partial thickness cuff tear debridement      reports that she has quit smoking. Her smoking use included Cigarettes. She has never used smokeless tobacco. She reports that she drinks about 0.5 oz of alcohol per week  She reports that she does not use illicit drugs. Social History     Social History Narrative     History   Smoking Status    Former Smoker    Types: Cigarettes   Smokeless Tobacco    Never Used     Comment: quit 1980      Family History   Problem Relation Age of Onset   [place] Stroke Mother     Hypertension Mother     Cancer Father     Cancer Sister     Cancer Brother      Allergies   Allergen Reactions    Amlodipine Swelling     Edema of feet/ankles    Amlodipine-Benazepril Swelling    Augmentin [Amoxicillin-Pot Clavulanate] Other (comments)     allopecia    Codeine Unknown (comments)    Levofloxacin Other (comments)     HAIR LOSS    Nitrofurantoin Macrocrystalline Other (comments)     Flu like symptoms    Penicillins Other (comments)     \"It makes me cold and hot\"    Sweat, itch    Sulfa (Sulfonamide Antibiotics) Nausea Only    Sulfamethoxazole-Trimethoprim Other (comments)    Verapamil Other (comments)     Flu like symptoms   fatigue       Problem List:      Patient Active Problem List    Diagnosis    Essential hypertension    Hyperlipidemia    Acquired hypothyroidism    Arthritis of right shoulder region     Mild/mod right GH & mod AC arthritis + RC tendinosis    MRI R shoulder (2/18/2017): Impression:  1. Moderate to severe supraspinatus tendinosis. Partial thickness tears at the undersurface and musculotendinous junction. Mild-to-moderate subacromial subdeltoid bursitis. 2. Moderate infraspinatus tendinosis. Question tiny partial thickness tear of infraspinatus tendon at its insertion.   3. Moderate osteoarthritis of the acromioclavicular joint with joint effusion. Mild/moderate osteoarthritis of the glenohumeral joint.  Impingement syndrome of right shoulder    Chronic ITP (idiopathic thrombocytopenia) (HCC)     Followed by Dr. 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    - 7/23/2018 [heme/onc]: monthly CBC, \"tx reserved for when plt < 30k\"      Polyp of ascending colon     On 6/11/2018 c-scope. Biopsy pending.  Non-seasonal allergic rhinitis due to pollen    Osteopenia of multiple sites     DEXA 3/2018      Frontal sinus pain    Headache, chronic daily     - 3/14/2018 [ENT; Dr. Glover Carey: \"I in no way think that her headaches are sinus in origin. I suspect they are related to her known orthopedic issues. \"      DDD (degenerative disc disease), cervical    Citizen of the [place] speaking patient    GERD (gastroesophageal reflux disease)    Primary insomnia    Sensorineural hearing loss, bilateral       Medications:     Current Outpatient Prescriptions   Medication Sig    montelukast (SINGULAIR) 10 mg tablet Take 10 mg by mouth daily.  omeprazole (PRILOSEC) 40 mg capsule Take 1 Cap by mouth daily. (Patient taking differently: Take 40 mg by mouth daily as needed.)    metoprolol succinate (TOPROL-XL) 50 mg XL tablet Take 1 Tab by mouth daily.  verapamil ER (VERELAN) 120 mg ER capsule Take 1 Cap by mouth daily.  levothyroxine (SYNTHROID) 88 mcg tablet Take 1 Tab by mouth daily. Indications: hypothyroidism    quinapril (ACCUPRIL) 40 mg tablet Take 1 Tab by mouth daily. Indications: hypertension    triamterene-hydroCHLOROthiazide (DYAZIDE) 37.5-25 mg per capsule Take 1 Cap by mouth daily. Indications: hypertension    lovastatin (MEVACOR) 20 mg tablet Take 1 Tab by mouth Daily (before dinner). Indications: hyperlipidemia    therapeutic multivitamin (THERAGRAN) tablet Take 1 Tab by mouth daily.  calcium-cholecalciferol, d3, (CALCIUM 600 + D) 600-125 mg-unit tab Take 1 Tab by mouth two (2) times a day.  ascorbic acid, vitamin C, (VITAMIN C) 500 mg tablet Take 500 mg by mouth daily.  DOCOSAHEXANOIC ACID/EPA (FISH OIL PO) Take 1 Cap by mouth daily.  MSM-collag-co Q10-herb no. 226 [id number] mg tab Take 1 Tab by mouth daily.  b complex vitamins tablet Take 1 Tab by mouth daily.  acetaminophen (TYLENOL) 325 mg tablet Take 325 mg by mouth every four (4) hours as needed for Pain.  fexofenadine (ALLEGRA) 180 mg tablet Take 1 Tab by mouth daily as needed for Allergies.  fluticasone (FLONASE) 50 mcg/actuation nasal spray 2 Sprays by Both Nostrils route daily. (Patient taking differently: 2 Sprays by Both Nostrils route daily as needed.)    clindamycin (CLEOCIN) 150 mg capsule Take 1 Cap by mouth three (3) times daily. No current facility-administered medications for this visit. Review of Systems:     Review of Systems   Constitutional: Positive for malaise/fatigue. Negative for chills and fever. HENT: Positive for ear pain. Negative for sore throat. Respiratory: Positive for shortness of breath. Negative for cough. Cardiovascular: Negative for chest pain and palpitations. Gastrointestinal: Negative for abdominal pain. Genitourinary: Negative for dysuria. Musculoskeletal: Negative for myalgias. Skin: Positive for itching. Negative for rash. Neurological: Positive for weakness. Negative for speech change, focal weakness and headaches. Endo/Heme/Allergies: Does not bruise/bleed easily. Psychiatric/Behavioral: Negative for depression. The patient is not nervous/anxious and does not have insomnia.         Physical Assessment:   VS:    Vitals:    07/25/18 1023   BP: 148/73   Pulse: 72   Resp: 18   Temp: 95.8 °F (35.4 °C)   TempSrc: Oral   SpO2: 96%   Weight: 154 lb 12.8 oz (70.2 kg)   Height: 5' 6\" (1.676 m)   PainSc:   0 - No pain       Physical Exam   Constitutional: She is oriented to person, place, and time. She appears well-developed and well-nourished. HENT:   Head: Normocephalic and atraumatic. Right Ear: External ear and ear canal normal. No mastoid tenderness. Tympanic membrane is injected. Tympanic membrane is not erythematous. No middle ear effusion. No hemotympanum. Left Ear: Tympanic membrane, external ear and ear canal normal.   Ears:    Eyes: EOM are normal.   Neck: Neck supple. No thyromegaly present. Cardiovascular: Normal rate, regular rhythm and intact distal pulses. No murmur heard. Pulmonary/Chest: Effort normal and breath sounds normal. She has no wheezes. She has no rales. Abdominal: Soft. Bowel sounds are normal. There is no tenderness. There is no rigidity, no guarding and no CVA tenderness. No hernia. Musculoskeletal: Normal range of motion. Neurological: She is alert and oriented to person, place, and time. No cranial nerve deficit. Coordination normal.   Skin: Skin is warm and dry. She is not diaphoretic. Psychiatric: She has a normal mood and affect. Her behavior is normal. Judgment and thought content normal.   Nursing note reviewed.

## 2018-07-25 NOTE — MR AVS SNAPSHOT
Neil Nguyễn 
 
 
 [address] [facility] [id number] [phone] Patient: Patrick Saul MRN: [id number] [id number] Visit Information Angel Watts Personal Médico Departamento Teléfono del Dep. Número de visita 7/25/2018 10:00 AM Manfred Prince, 3 [facility] [phone] Your Appointments 10/22/2018  1:30 PM  
Follow Up with Manfred Prince MD  
3 [facility]) Appt Note: medication f/u  
 [address] [facility] [address] [phone]  
  
   
 [address] Upcoming Health Maintenance Date Due DTaP/Tdap/Td series (1 - Tdap) 6/5/1962 ZOSTER VACCINE AGE 60> 4/5/2001 GLAUCOMA SCREENING Q2Y 6/5/2006 Influenza Age 5 to Adult 8/1/2018 MEDICARE YEARLY EXAM 2/22/2019 Alergias  Review Complete El: 7/25/2018 Por: Manfred Prince MD  
 A partir del:  7/25/2018 Intensidad Anotado Tipo de reacción [facility] Amlodipine  01/08/2016    Swelling Edema of feet/ankles Amlodipine-benazepril  07/25/2018    Swelling Augmentin [Amoxicillin-pot Clavulanate]  02/25/2014    Other (comments)  
 allopecia Codeine  06/02/2010    Unknown (comments) Levofloxacin  03/03/2016    Other (comments) HAIR LOSS Nitrofurantoin Macrocrystalline  02/04/2014    Other (comments) Flu like symptoms Penicillins  08/18/2012    Other (comments) \"It makes me cold and hot\" Sweat, itch  
 Sulfa (Sulfonamide Antibiotics)  06/02/2010    Nausea Only Sulfamethoxazole-trimethoprim  02/11/2003    Other (comments) Verapamil  07/25/2018    Other (comments) Flu like symptoms  
fatigue Vacunas actuales Revisadas el:  1/23/2018 Lorene Every Influenza High Dose Vaccine PF 10/2/2017  Influenza Vaccine 11/2/2015 12:00 AM, 10/29/2013 12:00 AM  
 Influenza Vaccine Split 12/9/2010 Pneumococcal Conjugate (PCV-13) 11/2/2015 12:00 AM  
 Pneumococcal Polysaccharide (PPSV-23) 11/10/2009, 10/31/2005 No revisadas esta visita You Were Diagnosed With   
  
 Cris Fierro Essential hypertension    -  Primary ICD-10-CM: I10 
ICD-9-CM: 401.9 Headache, chronic daily     ICD-10-CM: R51 ICD-9-CM: 784.0 Frontal sinus pain     ICD-10-CM: J34.89 ICD-9-CM: 478.19 Partes vitales PS Pulso Temperatura Resp [place] ( percentil de crecimiento) Peso (percentil de crecimiento) 136/84 (BP 1 Location: Right arm, BP Patient Position: Sitting) 72 95.8 °F (35.4 °C) (Oral) 18 5' 6\" (1.676 m) 154 lb 12.8 oz (70.2 kg) LMP (última chidi) SpO2 BMI ([facility]) Estado obstétrico Estatus de tabaquísmo (LMP Unknown) 96% 24.99 kg/m2 Hysterectomy Former Smoker Historial de signos vitales BMI and BSA Data Body Mass Index Body Surface Area 24.99 kg/m 2 1.81 m 2 [facility] Pharmacy Name Phone [facility] [address] [phone] Yu lista de medicamentos actualizada Lista actualizada 7/25/18 11:15 AM.  Garlon Mean use yu lista de medicamentos más reciente. b complex vitamins tablet Take 1 Tab by mouth daily. CALCIUM 600 + D 600-125 mg-unit Tab Medicamento genérico:  calcium-cholecalciferol (d3) Take 1 Tab by mouth two (2) times a day. fexofenadine 180 mg tablet También conocido lorie: Seretha Lever Take 1 Tab by mouth daily as needed for Allergies. FISH OIL PO Take 1 Cap by mouth daily. fluticasone 50 mcg/actuation nasal spray También conocido lorie:  FLONASE 2 Sprays by Both Nostrils route daily. Indications: CHRONIC NON-ALLERGIC RHINITIS  
  
 levothyroxine 88 mcg tablet También conocido lorie:  SYNTHROID Take 1 Tab by mouth daily. Indications: hypothyroidism  
  
 lovastatin 20 mg tablet También conocido lorie:  MEVACOR Take 1 Tab by mouth Daily (before dinner). Indications: hyperlipidemia  
  
 metoprolol succinate 50 mg XL tablet También conocido lorie:  NUMUDS-FM Take 1 Tab by mouth daily. MSM-collag-co G16-bzyq no. 226 [id number] mg Tab Take 1 Tab by mouth daily. neomycin-polymyxin-hydrocortisone (buffered) 3.5-10,000-1 mg/mL-unit/mL-% otic suspension También conocido lorie:  [address] Administer 3 Drops in left ear four (4) times daily for 7 days. omeprazole 40 mg capsule También conocido lorie:  Emrey Pound Take 1 Cap by mouth daily. quinapril 40 mg tablet También conocido lorie:  Corby Rogers Take 1 Tab by mouth daily. Indications: hypertension SINGULAIR 10 mg tablet Medicamento genérico:  montelukast  
Take 10 mg by mouth daily. therapeutic multivitamin tablet También conocido lorie:  [facility] Take 1 Tab by mouth daily. triamterene-hydroCHLOROthiazide 37.5-25 mg per capsule También conocido lorie:  Marcina Majors Take 1 Cap by mouth daily. Indications: hypertension TYLENOL 325 mg tablet Medicamento genérico:  acetaminophen Take 325 mg by mouth every four (4) hours as needed for Pain.  
  
 verapamil  mg ER capsule También conocido lorie:  [employer] Take 1 Cap by mouth daily. VITAMIN C 500 mg tablet Medicamento genérico:  ascorbic acid (vitamin C) Take 500 mg by mouth daily. Recetas Enviado a la Raul Refills  
 fluticasone (FLONASE) 50 mcg/actuation nasal spray 3 Si Sprays by Both Nostrils route daily. Indications: CHRONIC NON-ALLERGIC RHINITIS Class: Normal  
 Pharmacy: [address] [place] [address], [address] [place] [address] Street Ph #: [phone] Route: Both Nostrils  
 neomycin-polymyxin-hydrocortisone, buffered, (PEDIOTIC) 3.5-10,000-1 mg/mL-unit/mL-% otic suspension 0 Sig: Administer 3 Drops in left ear four (4) times daily for 7 days.   
 Class: Normal  
 Pharmacy: [address] - [address]  #: [phone] Route: Left Ear Instrucciones para el Paciente To Do: 
· Call the cardiology office to follow-up on your \"labile hypertension\", as I would like their expert opinion on what medications you COULD use if the decision is made to stop the verapamil due to the side effects of flu-like symptoms · Start your ear drops for your ear infection on the left as soon as possible Notes from your doctor: · Read below re: hernia (which is something you MIGHT have with re: to your intermittent left abdominal bulging) [facility]   
Lizandro Singh [address] [facility]  
[facility], [address] [facility]  
[phone]    
 
 
  
Hernia: Care Instructions Your Care Instructions A hernia develops when tissue bulges through a weak spot in the wall of your belly. The groin area and the navel are common areas for a hernia. A hernia can also develop near the area of a surgery you had before. Pressure from lifting, straining, or coughing can tear the weak area, causing the hernia to bulge and be painful. If you cannot push a hernia back into place, the tissue may become trapped outside the belly wall. If the hernia gets twisted and loses its blood supply, it will swell and die. This is called a strangulated hernia. It usually causes a lot of pain. It needs treatment right away. Some hernias need to be repaired to prevent a strangulated hernia. If your hernia causes symptoms or is large, you may need surgery. Follow-up care is a key part of your treatment and safety. Be sure to make and go to all appointments, and call your doctor if you are having problems. It's also a good idea to know your test results and keep a list of the medicines you take. How can you care for yourself at home? · Take care when lifting heavy objects. · Stay at a healthy weight. · Do not smoke. Smoking can cause coughing, which can cause your hernia to bulge. If you need help quitting, talk to your doctor about stop-smoking programs and medicines. These can increase your chances of quitting for good. · Talk with your doctor before wearing a corset or truss for a hernia. These devices are not recommended for treating hernias and sometimes can do more harm than good. There may be certain situations when your doctor thinks a truss would work, but these are rare. When should you call for help? Call your doctor now or seek immediate medical care if: 
  · You have new or worse belly pain.  
  · You are vomiting.  
  · You cannot pass stools or gas.  
  · You cannot push the hernia back into place with gentle pressure when you are lying down.  
  · The area over the hernia turns red or becomes tender.  
 Watch closely for changes in your health, and be sure to contact your doctor if you have any problems. Where can you learn more? Go to http://jalyn-taco.info/. Enter C129 in the search box to learn more about \"Hernia: Care Instructions. \" Current as of: May 12, 2017 Content Version: 11.7 © 4019-4923 [employer]. Care instructions adapted under license by [employer] (which disclaims liability or warranty for this information). If you have questions about a medical condition or this instruction, always ask your healthcare professional. [address] any warranty or liability for your use of this information. Introducing [facility]! [facility] introduce portal paciente MyChart . Ahora se puede acceder a partes de yu expediente médico, enviar por correo electrónico la oficina de yu médico y solicitar renovaciones de medicamentos en línea. En yu navegador de Internet , Meme Cooper a [email or web address] Mehrdad michelle en el usuario por Coralyn Galena?  Elritika martinez aquí en la Daniel Das. Verá la página de registro Watson. Ingrese yu código de [employer] Marguerite gena y lorie aparece a continuación. Usted no tendrá que [employer] código después de anuj completado el proceso de registro . Si usted no se inscribe antes de la fecha de caducidad , debe solicitar un nuevo código. · MyChart Código de acceso : [id number] Expires: 8/6/2018  1:33 PM 
 
Ingresa los últimos cuatro dígitos de yu Número de Seguro Social ( xxxx ) y fecha de nacimiento ( dd / mm / aaaa ) lorie se indica y mehrdad clic en Enviar. Usted será llevado a la siguiente página de registro . Crear un ID MyChart . Esta será yu ID de inicio de sesión de MyChart y no puede ser [place] , por lo que pensar en kylie que es Leigh Ann Giles y fácil de recordar . Crear kylie contraseña MyChart . Usted puede cambiar yu contraseña en cualquier momento . Ingrese yu Password Reset de preguntas y [place] . [place] se puede utilizar en un momento posterior si usted olvida yu contraseña. Introduzca yu dirección de correo electrónico . Laine Rosenthal recibirá kylie notificación por correo electrónico cuando la nueva información está disponible en MyChart . Laine Gajohn clic en Registrarse. Elva Sow meghan y descargar porciones de yu expediente médico. 
Mehrdad clic en el enlace de descarga del menú Resumen para descargar kylie copia portátil de yu información médica . Si tiene [employer] , por favor visite la sección de preguntas frecuentes del sitio web MyChart . Recuerde, MyChart NO es que se utilizará para las necesidades urgentes. Para emergencias médicas , llame al 911 . Ahora disponible en yu iPhone y Android ! Por favor proporcione chel resumen de la documentación de cuidado a yu próximo proveedor. Your primary care clinician is listed as Levy Bravo. If you have any questions after today's visit, please call [phone].

## 2018-08-14 DIAGNOSIS — I10 ESSENTIAL HYPERTENSION: ICD-10-CM

## 2018-08-14 DIAGNOSIS — E03.9 ACQUIRED HYPOTHYROIDISM: ICD-10-CM

## 2018-08-14 DIAGNOSIS — J30.1 NON-SEASONAL ALLERGIC RHINITIS DUE TO POLLEN: ICD-10-CM

## 2018-08-14 DIAGNOSIS — E78.5 HYPERLIPIDEMIA, UNSPECIFIED HYPERLIPIDEMIA TYPE: ICD-10-CM

## 2018-08-15 RX ORDER — TRIAMTERENE AND HYDROCHLOROTHIAZIDE 37.5; 25 MG/1; MG/1
CAPSULE ORAL
Qty: 90 CAP | Refills: 1 | Status: SHIPPED | OUTPATIENT
Start: 2018-08-15 | End: 2018-10-17 | Stop reason: SDUPTHER

## 2018-08-15 RX ORDER — QUINAPRIL 40 MG/1
TABLET ORAL
Qty: 90 TAB | Refills: 1 | Status: SHIPPED | OUTPATIENT
Start: 2018-08-15 | End: 2018-10-17 | Stop reason: SDUPTHER

## 2018-08-15 RX ORDER — MONTELUKAST SODIUM 10 MG/1
10 TABLET ORAL DAILY
Qty: 90 TAB | Refills: 1 | Status: SHIPPED | OUTPATIENT
Start: 2018-08-15 | End: 2019-03-26 | Stop reason: SDUPTHER

## 2018-08-15 RX ORDER — LOVASTATIN 20 MG/1
TABLET ORAL
Qty: 90 TAB | Refills: 1 | Status: SHIPPED | OUTPATIENT
Start: 2018-08-15 | End: 2018-10-17 | Stop reason: SDUPTHER

## 2018-08-15 RX ORDER — LEVOTHYROXINE SODIUM 88 UG/1
TABLET ORAL
Qty: 90 TAB | Refills: 1 | Status: SHIPPED | OUTPATIENT
Start: 2018-08-15 | End: 2018-10-17 | Stop reason: SDUPTHER

## 2018-08-15 NOTE — TELEPHONE ENCOUNTER
Last seen 7/25/18    Last filled     Singulair: never entered as Historial  Dyazide 2/21/18 qty 90 w/ 1 refill  Accupril 2/21/18 qty 90 w/ 1 refill  Lovastatin 2/21/18 qty 90 w/ 1 refill   Synthroid 2/21/18 qty 90 w/ 1 refill

## 2018-10-01 ENCOUNTER — TELEPHONE (OUTPATIENT)
Dept: FAMILY MEDICINE CLINIC | Age: 78
End: 2018-10-01

## 2018-10-01 DIAGNOSIS — E03.9 ACQUIRED HYPOTHYROIDISM: ICD-10-CM

## 2018-10-01 DIAGNOSIS — D69.3 CHRONIC ITP (IDIOPATHIC THROMBOCYTOPENIA) (HCC): Chronic | ICD-10-CM

## 2018-10-01 DIAGNOSIS — I10 ESSENTIAL HYPERTENSION: ICD-10-CM

## 2018-10-01 NOTE — TELEPHONE ENCOUNTER
Pt would like to know if labs are required for f/u appt with Dr. Bhavana Chiang. If so, pt would like a call letting her know when they are entered into the system.      Future Appointments  Date Time Provider Lb Elise   10/22/2018 1:30 PM Lee Hill MD [facility]

## 2018-10-17 ENCOUNTER — HOSPITAL ENCOUNTER (OUTPATIENT)
Dept: LAB | Age: 78
Discharge: HOME | End: 2018-10-17

## 2018-10-17 DIAGNOSIS — E78.5 HYPERLIPIDEMIA, UNSPECIFIED HYPERLIPIDEMIA TYPE: ICD-10-CM

## 2018-10-17 DIAGNOSIS — I10 ESSENTIAL HYPERTENSION: ICD-10-CM

## 2018-10-17 DIAGNOSIS — E03.9 ACQUIRED HYPOTHYROIDISM: ICD-10-CM

## 2018-10-17 PROCEDURE — 99001 SPECIMEN HANDLING PT-LAB: CPT | Performed by: FAMILY MEDICINE

## 2018-10-17 NOTE — TELEPHONE ENCOUNTER
Pt had an appt scheduled with Dr. Jones Corbin for 10/18/18 and had to reschedule due to provider being out of office. Pt is in need of a refill of the following medication, please advise.

## 2018-10-18 LAB
ALBUMIN SERPL-MCNC: 4.1 G/DL (ref 3.5–4.8)
ALBUMIN/GLOB SERPL: 1.4 {RATIO} (ref 1.2–2.2)
ALP SERPL-CCNC: 88 IU/L (ref 39–117)
ALT SERPL-CCNC: 20 IU/L (ref 0–32)
AST SERPL-CCNC: 29 IU/L (ref 0–40)
BASOPHILS # BLD AUTO: 0.1 X10E3/UL (ref 0–0.2)
BASOPHILS NFR BLD AUTO: 2 %
BILIRUB SERPL-MCNC: 0.2 MG/DL (ref 0–1.2)
BUN SERPL-MCNC: 16 MG/DL (ref 8–27)
BUN/CREAT SERPL: 23 (ref 12–28)
CALCIUM SERPL-MCNC: 9.6 MG/DL (ref 8.7–10.3)
CHLORIDE SERPL-SCNC: 103 MMOL/L (ref 96–106)
CO2 SERPL-SCNC: 25 MMOL/L (ref 20–29)
CREAT SERPL-MCNC: 0.69 MG/DL (ref 0.57–1)
EOSINOPHIL # BLD AUTO: 0.3 X10E3/UL (ref 0–0.4)
EOSINOPHIL NFR BLD AUTO: 7 %
ERYTHROCYTE [DISTWIDTH] IN BLOOD BY AUTOMATED COUNT: 13 % (ref 12.3–15.4)
GLOBULIN SER CALC-MCNC: 2.9 G/DL (ref 1.5–4.5)
GLUCOSE SERPL-MCNC: 103 MG/DL (ref 65–99)
HCT VFR BLD AUTO: 40.3 % (ref 34–46.6)
HGB BLD-MCNC: 13.1 G/DL (ref 11.1–15.9)
IMM GRANULOCYTES # BLD: 0 X10E3/UL (ref 0–0.1)
IMM GRANULOCYTES NFR BLD: 0 %
LYMPHOCYTES # BLD AUTO: 1.5 X10E3/UL (ref 0.7–3.1)
LYMPHOCYTES NFR BLD AUTO: 31 %
MCH RBC QN AUTO: 28.9 PG (ref 26.6–33)
MCHC RBC AUTO-ENTMCNC: 32.5 G/DL (ref 31.5–35.7)
MCV RBC AUTO: 89 FL (ref 79–97)
MONOCYTES # BLD AUTO: 0.4 X10E3/UL (ref 0.1–0.9)
MONOCYTES NFR BLD AUTO: 8 %
MORPHOLOGY BLD-IMP: ABNORMAL
NEUTROPHILS # BLD AUTO: 2.4 X10E3/UL (ref 1.4–7)
NEUTROPHILS NFR BLD AUTO: 52 %
PLATELET # BLD AUTO: 37 X10E3/UL (ref 150–379)
POTASSIUM SERPL-SCNC: 4.4 MMOL/L (ref 3.5–5.2)
PROT SERPL-MCNC: 7 G/DL (ref 6–8.5)
RBC # BLD AUTO: 4.54 X10E6/UL (ref 3.77–5.28)
SODIUM SERPL-SCNC: 143 MMOL/L (ref 134–144)
T4 FREE SERPL-MCNC: 1.68 NG/DL (ref 0.82–1.77)
TSH SERPL DL<=0.005 MIU/L-ACNC: 1.5 UIU/ML (ref 0.45–4.5)
WBC # BLD AUTO: 4.7 X10E3/UL (ref 3.4–10.8)

## 2018-10-18 RX ORDER — QUINAPRIL 40 MG/1
TABLET ORAL
Qty: 90 TAB | Refills: 1 | Status: SHIPPED | OUTPATIENT
Start: 2018-10-18 | End: 2019-02-18 | Stop reason: SDUPTHER

## 2018-10-18 RX ORDER — LOVASTATIN 20 MG/1
TABLET ORAL
Qty: 90 TAB | Refills: 1 | Status: SHIPPED | OUTPATIENT
Start: 2018-10-18 | End: 2019-02-18 | Stop reason: SDUPTHER

## 2018-10-18 RX ORDER — LEVOTHYROXINE SODIUM 88 UG/1
TABLET ORAL
Qty: 90 TAB | Refills: 1 | Status: SHIPPED | OUTPATIENT
Start: 2018-10-18 | End: 2019-02-18 | Stop reason: SDUPTHER

## 2018-10-18 RX ORDER — HYDRALAZINE HYDROCHLORIDE 25 MG/1
25 TABLET, FILM COATED ORAL 2 TIMES DAILY
Refills: 3 | COMMUNITY
Start: 2018-08-20 | End: 2019-03-26 | Stop reason: SDUPTHER

## 2018-10-18 RX ORDER — TRIAMTERENE AND HYDROCHLOROTHIAZIDE 37.5; 25 MG/1; MG/1
CAPSULE ORAL
Qty: 90 CAP | Refills: 1 | Status: SHIPPED | OUTPATIENT
Start: 2018-10-18 | End: 2019-02-18 | Stop reason: SDUPTHER

## 2018-10-18 NOTE — TELEPHONE ENCOUNTER
Last seen 7/25/18    Last filled 8/15/18 qty 90 w/ 1 refill    Patient should still have a refill w/ [facility] and should not be due till Feb 2019

## 2018-10-18 NOTE — TELEPHONE ENCOUNTER
Reviewed chart. From cardiology appt on 8/20/2018: The patient's verapamil and metoprolol are being stopped and I am switching her to hydralazine. She will continue Maxzide and Quinapril in the meantime which had worked very while for her in the past. I have gone over this at length with the patient and her daughter today. They did not agree to come in for a visit until at least three months from now. They will call in with the results of her blood pressure readings and we'll adjust hydralazine if need be. I am starting at 25 mg twice a day. Orders Placed This Encounter    lovastatin (MEVACOR) 20 mg tablet     Sig: TAKE 1 TABLET EVERY DAY BEFORE DINNER FOR CHOLESTEROL     Dispense:  90 Tab     Refill:  1    levothyroxine (SYNTHROID) 88 mcg tablet     Sig: TAKE 1 TABLET EVERY DAY  FOR  HYPOTHYROIDISM     Dispense:  90 Tab     Refill:  1    quinapril (ACCUPRIL) 40 mg tablet     Sig: TAKE 1 TABLET EVERY DAY FOR HYPERTENSION     Dispense:  90 Tab     Refill:  1    triamterene-hydroCHLOROthiazide (DYAZIDE) 37.5-25 mg per capsule     Sig: TAKE 1 CAPSULE EVERY DAY  FOR  HYPERTENSION     Dispense:  90 Cap     Refill:  1       Please call Katy Laughter and inform her that her last platelet count was 37. I already routed a copy of her CBC to her hematologist, Dr. Tony Sommers. Lab Results   Component Value Date/Time    WBC 4.7 10/17/2018 07:05 AM    HGB 13.1 10/17/2018 07:05 AM    HCT 40.3 10/17/2018 07:05 AM    PLATELET 37 (LL) 90/19/2134 07:05 AM    MCV 89 10/17/2018 07:05 AM     Thanks.

## 2018-10-18 NOTE — TELEPHONE ENCOUNTER
Pt called back to change her pharmacy from [facility] to [facility].

## 2018-10-23 ENCOUNTER — DOCUMENTATION ONLY (OUTPATIENT)
Dept: FAMILY MEDICINE CLINIC | Age: 78
End: 2018-10-23

## 2018-10-23 NOTE — PROGRESS NOTES
Pt had an appt scheduled today @ 1:00pm. Other PSR called both the home and cell phone number to r/s her appt due to provider calling out sick. She left a message at the home number and she got a hold of someone at the mobile number, it was assumed she was speaking w/ the pt to r/s her appt for tomorrow 10/24/18 @ 1:00pm.     Pt came in @ 12:30pm, I asked if the pt was aware that we had called her this morning and moved her appt to tomorrow. Pt raised her voice a little and started talking very fast stating that nobody had called her and that she left her house @ 5:00am. Other PSR verified both her numbers and pt stated that the person she spoke to may have been her daughter. Pt did say that she no longer wants to see Dr Vishal Cooney anymore because she has cxlled on her multiple times. I tried to offer her Dr Wili Whitlock since he is Norwegian speaking but she declined him stating that Dr Vishal Cooney does not speak Norwegian well  and wants an [facility] doctor. Pt wanted me to cxl tomorrow's appt and wanted to come in as a sick visit on Thursday.

## 2018-10-25 ENCOUNTER — OFFICE VISIT (OUTPATIENT)
Dept: FAMILY MEDICINE CLINIC | Age: 78
End: 2018-10-25

## 2018-10-25 VITALS
RESPIRATION RATE: 20 BRPM | OXYGEN SATURATION: 98 % | SYSTOLIC BLOOD PRESSURE: 140 MMHG | HEART RATE: 79 BPM | TEMPERATURE: 98.1 F | WEIGHT: 149.6 LBS | HEIGHT: 66 IN | DIASTOLIC BLOOD PRESSURE: 82 MMHG | BODY MASS INDEX: 24.04 KG/M2

## 2018-10-25 DIAGNOSIS — M26.609 TMJ (TEMPOROMANDIBULAR JOINT DISORDER): ICD-10-CM

## 2018-10-25 DIAGNOSIS — M72.2 PLANTAR FASCIITIS: ICD-10-CM

## 2018-10-25 DIAGNOSIS — M51.36 DDD (DEGENERATIVE DISC DISEASE), LUMBAR: ICD-10-CM

## 2018-10-25 DIAGNOSIS — M54.16 CHRONIC LEFT LUMBAR RADICULOPATHY: Primary | ICD-10-CM

## 2018-10-25 DIAGNOSIS — Z23 ENCOUNTER FOR IMMUNIZATION: ICD-10-CM

## 2018-10-25 DIAGNOSIS — M48.061 FORAMINAL STENOSIS OF LUMBAR REGION: ICD-10-CM

## 2018-10-25 PROBLEM — J34.89 FRONTAL SINUS PAIN: Status: RESOLVED | Noted: 2018-02-22 | Resolved: 2018-10-25

## 2018-10-25 PROBLEM — K63.5 POLYP OF ASCENDING COLON: Status: RESOLVED | Noted: 2018-06-11 | Resolved: 2018-10-25

## 2018-10-25 NOTE — PROGRESS NOTES
Caryle Fitch is a 69 y.o. female (: 1940) presenting to address:    Chief Complaint   Patient presents with    Leg Pain     Bilateral    Foot Pain     Bilateral    Jaw Pain       Vitals:    10/25/18 0934   BP: 140/82   Pulse: 79   Resp: 20   Temp: 98.1 °F (36.7 °C)   TempSrc: Oral   SpO2: 98%   Weight: 149 lb 9.6 oz (67.9 kg)   Height: 5' 6\" (1.676 m)   PainSc:  10 - Worst pain ever   PainLoc: Leg       Learning Assessment:     Learning Assessment 2018   PRIMARY LEARNER Patient   HIGHEST LEVEL OF EDUCATION - PRIMARY LEARNER  DID NOT GRADUATE HIGH SCHOOL   BARRIERS PRIMARY LEARNER LANGUAGE   CO-LEARNER CAREGIVER Yes   CO-LEARNER NAME daughter   CO-LEARNER HIGHEST LEVEL OF EDUCATION 4 YEARS OF COLLEGE   BARRIERS CO-LEARNER NONE   PRIMARY LANGUAGE Arabic   PRIMARY LANGUAGE CO-LEARNER ENGLISH    NEED No   LEARNER PREFERENCE PRIMARY LISTENING   LEARNER PREFERENCE CO-LEARNER LISTENING   LEARNING SPECIAL TOPICS no   ANSWERED BY self   RELATIONSHIP SELF     Depression Screening:     PHQ over the last two weeks 2018   Little interest or pleasure in doing things Not at all   Feeling down, depressed, irritable, or hopeless Not at all   Total Score PHQ 2 0     Fall Risk Assessment:     Fall Risk Assessment, last 12 mths 2018   Able to walk? Yes   Fall in past 12 months? No     Abuse Screening:     Abuse Screening Questionnaire 2018   Do you ever feel afraid of your partner? N   Are you in a relationship with someone who physically or mentally threatens you? N   Is it safe for you to go home? Y     Coordination of Care Questionaire:   1. Have you been to the ER, urgent care clinic since your last visit? Hospitalized since your last visit? NO    2. Have you seen or consulted any other health care providers outside of the [address] [place] [address] since your last visit? Include any pap smears or colon screening. NO    Advanced Directive:   1. Do you have an Advanced Directive? YES    2. Would you like information on Advanced Directives?  NO

## 2018-10-25 NOTE — PROGRESS NOTES
Fluad administered in the right deltoid  10/25/2018 by Justin Friend LPN with consent. Patient tolerated procedure well. With no bleeding observed at injection site. No reactions noted.

## 2018-10-25 NOTE — PROGRESS NOTES
Assessment/Plan:    1. Chronic left lumbar radiculopathy and h/o DDD lumbar with previous surgery. Ck xray. Likely needs MRI given radicular nature. nsaids contraindicated. Prednisone makes pt sick. Can consider tramadol if needed for severe pain.   -ck xray. MRI lumbar back. - MRI LUMB SPINE WO CONT; Future  - XR SPINE LUMB 2 OR 3 V; Future    2. TMJ (temporomandibular joint disorder)  -nsaids contraindicated. Don't want to do muscle relaxant b/c of fall risk. 3. Plantar fasciitis  -has appt with Dr. Mitzy Nicholson    4. Encounter for immunization  - INFLUENZA VACCINE INACTIVATED (IIV), SUBUNIT, ADJUVANTED, IM  - ADMIN INFLUENZA VIRUS VAC      The plan was discussed with the patient. The patient verbalized understanding and is in agreement with the plan. All medication potential side effects were discussed with the patient. Health Maintenance:   Health Maintenance   Topic Date Due    DTaP/Tdap/Td series (1 - Tdap) 06/05/1962    Shingrix Vaccine Age 50> (1 of 2) 06/05/1991    GLAUCOMA SCREENING Q2Y  06/05/2006    Influenza Age 9 to Adult  08/01/2018    MEDICARE YEARLY EXAM  02/22/2019    Pneumococcal 65+ Low/Medium Risk  Completed    Bone Densitometry (Dexa) Screening  Addressed       Dora Jaeger is a 69 y.o. female and presents with Leg Pain (Bilateral) and Foot Pain (Bilateral)     Subjective:  Pt of Dr. Alfonzo Ortiz presents with c/o foot pain bilat (L>R). She believes she has plantar fasciitis x several months. Pain is at base of heel. Has appt with Dr. Shelby Knutson ortho 5/31. Her pain is worse upon awakening. She states it feels tight and there is a knife in there. She has been doing home plantar fasciitis exercises. She is requesting an MRI for \"sciatic nerve\". She has h/o back surgery done 16 years ago. Pt has L lumbar radicular pain that she describes as a burning sensation. Has DDD and facet disease. She has chronic ITP, plt 50s.      She also c/o L jaw pain - worse with yawning and with eating. This started a few months ago. ROS:  Constitutional: No recent weight change. No weakness/fatigue. No f/c. Cardiovascular: No CP/palpitations. No GALDAMEZ/orthopnea/PND. Respiratory: No cough/sputum, dyspnea, wheezing. Gastointestinal: No dysphagia, reflux. No n/v. No constipation/diarrhea. No melena/rectal bleeding. Musculoskeletal: +joint pain/stiffness. No muscle pain/tenderness. The problem list was updated as a part of today's visit. Patient Active Problem List   Diagnosis Code    Essential hypertension I10    Hyperlipidemia E78.5    Acquired hypothyroidism E03.9    Impingement syndrome of right shoulder M75.41    Primary insomnia F51.01    Chronic ITP (idiopathic thrombocytopenia) ([facility]) D69.3    GERD (gastroesophageal reflux disease) K21.9    Sensorineural hearing loss, bilateral H90.3    Arthritis of right shoulder region M19.011    Mohawk speaking patient [id number]    DDD (degenerative disc disease), cervical M50.30    Headache, chronic daily R51    Osteopenia of multiple sites M85.89    Non-seasonal allergic rhinitis due to pollen J30.1       The PSH, FH were reviewed. SH:  Social History     Tobacco Use    Smoking status: Former Smoker     Types: Cigarettes    Smokeless tobacco: Never Used    Tobacco comment: quit 1980    Substance Use Topics    Alcohol use:  Yes     Alcohol/week: 0.5 oz     Types: 1 Glasses of wine per week     Comment: socially once weekly    Drug use: No       Medications/Allergies:  Current Outpatient Medications on File Prior to Visit   Medication Sig Dispense Refill    lovastatin (MEVACOR) 20 mg tablet TAKE 1 TABLET EVERY DAY BEFORE DINNER FOR CHOLESTEROL 90 Tab 1    levothyroxine (SYNTHROID) 88 mcg tablet TAKE 1 TABLET EVERY DAY  FOR  HYPOTHYROIDISM 90 Tab 1    quinapril (ACCUPRIL) 40 mg tablet TAKE 1 TABLET EVERY DAY FOR HYPERTENSION 90 Tab 1    triamterene-hydroCHLOROthiazide (DYAZIDE) 37.5-25 mg per capsule TAKE 1 CAPSULE EVERY DAY  FOR  HYPERTENSION 90 Cap 1    hydrALAZINE (APRESOLINE) 25 mg tablet Take 25 mg by mouth two (2) times a day. 3    montelukast (SINGULAIR) 10 mg tablet Take 1 Tab by mouth daily. 90 Tab 1    fluticasone (FLONASE) 50 mcg/actuation nasal spray 2 Sprays by Both Nostrils route daily. Indications: CHRONIC NON-ALLERGIC RHINITIS 3 Bottle 3    omeprazole (PRILOSEC) 40 mg capsule Take 1 Cap by mouth daily. (Patient taking differently: Take 40 mg by mouth daily as needed.) 90 Cap 3    fexofenadine (ALLEGRA) 180 mg tablet Take 1 Tab by mouth daily as needed for Allergies. 90 Tab 3    therapeutic multivitamin (THERAGRAN) tablet Take 1 Tab by mouth daily.  calcium-cholecalciferol, d3, (CALCIUM 600 + D) 600-125 mg-unit tab Take 1 Tab by mouth two (2) times a day.  ascorbic acid, vitamin C, (VITAMIN C) 500 mg tablet Take 500 mg by mouth daily.  DOCOSAHEXANOIC ACID/EPA (FISH OIL PO) Take 1 Cap by mouth daily.  MSM-MtoV-co Q10-herb no. 226 255-968-891-80 mg tab Take 1 Tab by mouth daily.  b complex vitamins tablet Take 1 Tab by mouth daily.  acetaminophen (TYLENOL) 325 mg tablet Take 325 mg by mouth every four (4) hours as needed for Pain. No current facility-administered medications on file prior to visit.          Allergies   Allergen Reactions    Amlodipine Swelling     Edema of feet/ankles    Amlodipine-Benazepril Swelling    Augmentin [Amoxicillin-Pot Clavulanate] Other (comments)     allopecia    Codeine Unknown (comments)    Levofloxacin Other (comments)     HAIR LOSS    Nitrofurantoin Macrocrystalline Other (comments)     Flu like symptoms    Penicillins Other (comments)     \"It makes me cold and hot\"    Sweat, itch    Sulfa (Sulfonamide Antibiotics) Nausea Only    Sulfamethoxazole-Trimethoprim Other (comments)    Verapamil Other (comments)     Flu like symptoms   fatigue       Objective:  Visit Vitals  /82 (BP 1 Location: Left arm, BP Patient Position: Sitting)   Pulse 79   Temp 98.1 °F (36.7 °C) (Oral)   Resp 20   Ht 5' 6\" (1.676 m)   Wt 149 lb 9.6 oz (67.9 kg)   LMP  (LMP Unknown)   SpO2 98%   BMI 24.15 kg/m²      Constitutional: Well developed, nourished, no distress, alert   CV: S1, S2.  RRR. No murmurs/rubs. No thrills palpated. No carotid bruits. Intact distal pulses. No edema. No aortic bruits. Pulm: No abnormalities on inspection. Clear to auscultation bilaterally. No wheezing/rhonchi. Normal effort. MS: Gait antalgic.  +pain over L heel. Strength intact bilateral upper and lower ext. No pain over lumbar paraspinal muscles. Neuro: A/O x 3. No focal motor or sensory deficits. Speech normal. Neg straight leg raise.

## 2018-10-25 NOTE — PATIENT INSTRUCTIONS
Temporomandibular Disorder: Care Instructions  Your Care Instructions    Temporomandibular (TM) disorders are a problem with the muscles and joints that connect your jaw to your skull. They cause pain when you open your mouth, chew, or yawn. You may feel this pain on one or both sides. TM disorders are often caused by tight jaw muscles. The tightness can be caused by clenching or grinding your teeth. This may happen when you have a lot of stress in your life. If you lower your stress, you may be able to stop clenching or grinding your teeth. This will help relax your jaw and reduce your pain. You may also be able to do some things at home to feel better. But if none of this works, your doctor may prescribe medicine to help relax your muscles and control the pain. Follow-up care is a key part of your treatment and safety. Be sure to make and go to all appointments, and call your doctor if you are having problems. It's also a good idea to know your test results and keep a list of the medicines you take. How can you care for yourself at home? · Put a warm, moist cloth or heating pad set on low on your jaw. Do this for 10 to 20 minutes at a time. Put a thin cloth between the heating pad and your skin. · Avoid hard or chewy foods that cause your jaws to work very hard. Examples include popcorn, jerky, tough meats, chewy breads, gum, and raw apples and carrots. · Choose softer foods that are easy to chew. These include eggs, yogurt, and soup. · Cut your food into small pieces. Chew slowly. · If your jaw gets too painful to chew, or if it locks, you may need to puree your food for a few days or weeks. · To relax your jaw, repeat this exercise for a few minutes every morning and evening. Watch yourself in a mirror. Gently open and close your mouth. Move your jaw straight up and down. But don't do this if it makes your pain worse.   · Get at least 30 minutes of exercise on most days of the week to relieve stress. Walking is a good choice. You also may want to do other activities, such as running, swimming, cycling, or playing tennis or team sports. · Do not:  ? Hold a phone between your shoulder and your jaw. ? Open your mouth all the way, like when you sing loudly or yawn. ? Clench or grind your teeth, bite your lips, or chew your fingernails. ? Clench things such as pens, pipes, or cigars between your teeth. When should you call for help? Call your doctor now or seek immediate medical care if:    · Your jaw is locked open or shut or it is hard to move your jaw.    Watch closely for changes in your health, and be sure to contact your doctor if:    · Your jaw pain gets worse.     · Your face is swollen.     · You do not get better as expected. Where can you learn more? Go to http://jalyn-taco.info/. Enter S363 in the search box to learn more about \"Temporomandibular Disorder: Care Instructions. \"  Current as of: March 28, 2018  Content Version: 11.8  © 8540-7654 [employer]. Care instructions adapted under license by [employer] (which disclaims liability or warranty for this information). If you have questions about a medical condition or this instruction, always ask your healthcare professional. [place] [address] any warranty or liability for your use of this information. Plantar Fasciitis: Exercises  Your Care Instructions  Here are some examples of typical rehabilitation exercises for your condition. Start each exercise slowly. Ease off the exercise if you start to have pain. Your doctor or physical therapist will tell you when you can start these exercises and which ones will work best for you. How to do the exercises  Towel stretch    1. Sit with your legs extended and knees straight. 2. Place a towel around your foot just under the toes. 3. Hold each end of the towel in each hand, with your hands above your knees.   4. Pull back with the towel so that your foot stretches toward you. 5. Hold the position for at least 15 to 30 seconds. 6. Repeat 2 to 4 times a session, up to 5 sessions a day. Calf stretch    1. Stand facing a wall with your hands on the wall at about eye level. Put the leg you want to stretch about a step behind your other leg. 2. Keeping your back heel on the floor, bend your front knee until you feel a stretch in the back leg. 3. Hold the stretch for 15 to 30 seconds. Repeat 2 to 4 times. Plantar fascia and calf stretch    1. Stand on a step as shown above. Be sure to hold on to the banister. 2. Slowly let your heels down over the edge of the step as you relax your calf muscles. You should feel a gentle stretch across the bottom of your foot and up the back of your leg to your knee. 3. Hold the stretch about 15 to 30 seconds, and then tighten your calf muscle a little to bring your heel back up to the level of the step. Repeat 2 to 4 times. Towel curls    1. While sitting, place your foot on a towel on the floor and scrunch the towel toward you with your toes. 2. Then, also using your toes, push the towel away from you. [place] pickups    1. Put marbles on the floor next to a cup.  2. Using your toes, try to lift the marbles up from the floor and put them in the cup. Follow-up care is a key part of your treatment and safety. Be sure to make and go to all appointments, and call your doctor if you are having problems. It's also a good idea to know your test results and keep a list of the medicines you take. Where can you learn more? Go to http://jalyn-taco.info/. Rosibel Brooks in the search box to learn more about \"Plantar Fasciitis: Exercises. \"  Current as of: November 29, 2017  Content Version: 11.8  © 8153-2713 [employer]. Care instructions adapted under license by [employer] (which disclaims liability or warranty for this information).  If you have questions about a medical condition or this instruction, always ask your healthcare professional. [place] [address] any warranty or liability for your use of this information.

## 2018-10-26 ENCOUNTER — TELEPHONE (OUTPATIENT)
Dept: FAMILY MEDICINE CLINIC | Age: 78
End: 2018-10-26

## 2018-10-26 NOTE — TELEPHONE ENCOUNTER
Pt would like to switch PCP from Dr Alex Lee to Dr Rozina Delatorre because of Dr Rozina Delatorre being more available.  Please advise

## 2018-11-09 ENCOUNTER — TELEPHONE (OUTPATIENT)
Dept: FAMILY MEDICINE CLINIC | Age: 78
End: 2018-11-09

## 2018-11-09 NOTE — TELEPHONE ENCOUNTER
Tell pt the MRI showed stenosis in the back that is affecting her L5. I'm recommending she be eval by neurosurgery.  I'll put in referral.

## 2018-11-12 NOTE — TELEPHONE ENCOUNTER
Called daughter Esther Silt [phone]. They will call Dr Venita Barnes to schedule consult, aware to bring films.

## 2018-11-15 ENCOUNTER — TELEPHONE (OUTPATIENT)
Dept: FAMILY MEDICINE CLINIC | Age: 78
End: 2018-11-15

## 2018-11-15 DIAGNOSIS — F51.01 PRIMARY INSOMNIA: ICD-10-CM

## 2018-11-15 DIAGNOSIS — F51.01 PRIMARY INSOMNIA: Primary | ICD-10-CM

## 2018-11-15 RX ORDER — TRAZODONE HYDROCHLORIDE 50 MG/1
50 TABLET ORAL
Qty: 30 TAB | Refills: 1 | Status: SHIPPED | OUTPATIENT
Start: 2018-11-15 | End: 2018-11-15 | Stop reason: SDUPTHER

## 2018-11-15 RX ORDER — TRAZODONE HYDROCHLORIDE 50 MG/1
50 TABLET ORAL
Qty: 90 TAB | Refills: 1 | Status: SHIPPED | OUTPATIENT
Start: 2018-11-15 | End: 2019-03-26 | Stop reason: ALTCHOICE

## 2018-11-15 NOTE — TELEPHONE ENCOUNTER
I spoke to daughter Keith Teixeira. Pt is at oncology. Her platelets are down to 26. They are boosting her with prednisone (which makes her not sleep historically) and starting transfusions on Monday. Daughter states pt is irritable and doesn't manage well on no sleep. Pt has been on lorazepam and other --pams. Pt has also been on trazodone. She has tried melatonin 10 mg and benadryl. She asked hem/onc for a sleeping pill and they refused, routed her to pcp. Pt would like to  at pharmacy today to use tonight.

## 2018-11-15 NOTE — TELEPHONE ENCOUNTER
Patient is requesting a prescription for a sleeping pill. Patient was informed of the 72 hour turn around policy and stated that she is coming over here now and you will have to give her something to aid her sleep.

## 2018-11-28 ENCOUNTER — OFFICE VISIT (OUTPATIENT)
Dept: FAMILY MEDICINE CLINIC | Age: 78
End: 2018-11-28

## 2018-11-28 VITALS
HEIGHT: 66 IN | SYSTOLIC BLOOD PRESSURE: 145 MMHG | DIASTOLIC BLOOD PRESSURE: 80 MMHG | HEART RATE: 89 BPM | RESPIRATION RATE: 20 BRPM | BODY MASS INDEX: 23.72 KG/M2 | OXYGEN SATURATION: 97 % | WEIGHT: 147.6 LBS

## 2018-11-28 DIAGNOSIS — D69.3 CHRONIC ITP (IDIOPATHIC THROMBOCYTOPENIA) (HCC): Chronic | ICD-10-CM

## 2018-11-28 DIAGNOSIS — R10.13 EPIGASTRIC ABDOMINAL PAIN: ICD-10-CM

## 2018-11-28 DIAGNOSIS — I10 ESSENTIAL HYPERTENSION: ICD-10-CM

## 2018-11-28 DIAGNOSIS — E78.5 HYPERLIPIDEMIA, UNSPECIFIED HYPERLIPIDEMIA TYPE: ICD-10-CM

## 2018-11-28 DIAGNOSIS — R19.7 DIARRHEA, UNSPECIFIED TYPE: Primary | ICD-10-CM

## 2018-11-28 NOTE — PROGRESS NOTES
Assessment/Plan:    1. Diarrhea, unspecified type  -ck stool studies. - CULTURE, STOOL; Future  - C. DIFFICILE/EPI PCR; Future  - METABOLIC PANEL, COMPREHENSIVE; Future    2. Epigastric abdominal pain  -ppi daily (instead of prn) x 1 week and she should take it daily while on prednisone.  - METABOLIC PANEL, COMPREHENSIVE; Future      The plan was discussed with the patient. The patient verbalized understanding and is in agreement with the plan. All medication potential side effects were discussed with the patient. Health Maintenance:   Health Maintenance   Topic Date Due    DTaP/Tdap/Td series (1 - Tdap) 06/05/1962    Shingrix Vaccine Age 50> (1 of 2) 06/05/1991    GLAUCOMA SCREENING Q2Y  06/05/2006    MEDICARE YEARLY EXAM  02/22/2019    Pneumococcal 65+ Low/Medium Risk  Completed    Influenza Age 5 to Adult  Completed    Bone Densitometry (Dexa) Screening  Addressed       Meredith Owen is a 69 y.o. female and presents with Abdominal Pain     Subjective:  She states 11/4 she went to [facility] and had a chicken sandwich. Shortly thereafter, had epigastric pain then spreading to be generalized, n/v and diarrhea. Then 11/12-14 she ate trudy lettuce (which has been recalled due to e coli). She had abd pain and diarrhea (non-bloody). Had colo earlier this year, which only showed a few polyps. She does take steroids intermittent for ITP. No nsaids. Pain has improved. But still having vomiting and intermittent diarrhea. She uses ppi only intermittently. ROS:  Constitutional: No recent weight change. No weakness/fatigue. No f/c. Cardiovascular: No CP/palpitations. No GALDAMEZ/orthopnea/PND. Respiratory: No cough/sputum, dyspnea, wheezing. Gastointestinal: No dysphagia, reflux.  + n/v. No constipation/+diarrhea. No melena/rectal bleeding. The problem list was updated as a part of today's visit.   Patient Active Problem List   Diagnosis Code    Essential hypertension I10    Hyperlipidemia E78.5    Acquired hypothyroidism E03.9    Impingement syndrome of right shoulder M75.41    Primary insomnia F51.01    Chronic ITP (idiopathic thrombocytopenia) ([facility]) D69.3    GERD (gastroesophageal reflux disease) K21.9    Sensorineural hearing loss, bilateral H90.3    Arthritis of right shoulder region M19.011    Italian speaking patient [id number]    DDD (degenerative disc disease), cervical M50.30    Headache, chronic daily R51    Osteopenia of multiple sites M85.89    Non-seasonal allergic rhinitis due to pollen J30.1       The PSH, FH were reviewed. SH:  Social History     Tobacco Use    Smoking status: Former Smoker     Types: Cigarettes    Smokeless tobacco: Never Used    Tobacco comment: quit 1980    Substance Use Topics    Alcohol use: Yes     Alcohol/week: 0.5 oz     Types: 1 Glasses of wine per week     Comment: socially once weekly    Drug use: No       Medications/Allergies:  Current Outpatient Medications on File Prior to Visit   Medication Sig Dispense Refill    traZODone (DESYREL) 50 mg tablet Take 1 Tab by mouth nightly as needed for Sleep. 90 Tab 1    lovastatin (MEVACOR) 20 mg tablet TAKE 1 TABLET EVERY DAY BEFORE DINNER FOR CHOLESTEROL 90 Tab 1    levothyroxine (SYNTHROID) 88 mcg tablet TAKE 1 TABLET EVERY DAY  FOR  HYPOTHYROIDISM 90 Tab 1    quinapril (ACCUPRIL) 40 mg tablet TAKE 1 TABLET EVERY DAY FOR HYPERTENSION 90 Tab 1    triamterene-hydroCHLOROthiazide (DYAZIDE) 37.5-25 mg per capsule TAKE 1 CAPSULE EVERY DAY  FOR  HYPERTENSION 90 Cap 1    hydrALAZINE (APRESOLINE) 25 mg tablet Take 25 mg by mouth two (2) times a day. 3    montelukast (SINGULAIR) 10 mg tablet Take 1 Tab by mouth daily. 90 Tab 1    fluticasone (FLONASE) 50 mcg/actuation nasal spray 2 Sprays by Both Nostrils route daily. Indications: CHRONIC NON-ALLERGIC RHINITIS 3 Bottle 3    omeprazole (PRILOSEC) 40 mg capsule Take 1 Cap by mouth daily.  (Patient taking differently: Take 40 mg by mouth daily as needed.) 90 Cap 3    fexofenadine (ALLEGRA) 180 mg tablet Take 1 Tab by mouth daily as needed for Allergies. 90 Tab 3    therapeutic multivitamin (THERAGRAN) tablet Take 1 Tab by mouth daily.  calcium-cholecalciferol, d3, (CALCIUM 600 + D) 600-125 mg-unit tab Take 1 Tab by mouth two (2) times a day.  ascorbic acid, vitamin C, (VITAMIN C) 500 mg tablet Take 500 mg by mouth daily.  DOCOSAHEXANOIC ACID/EPA (FISH OIL PO) Take 1 Cap by mouth daily.  MSM-Curetis Q10-herb no. [phone] mg tab Take 1 Tab by mouth daily.  b complex vitamins tablet Take 1 Tab by mouth daily.  acetaminophen (TYLENOL) 325 mg tablet Take 325 mg by mouth every four (4) hours as needed for Pain. No current facility-administered medications on file prior to visit. Allergies   Allergen Reactions    Amlodipine Swelling     Edema of feet/ankles    Amlodipine-Benazepril Swelling    Augmentin [Amoxicillin-Pot Clavulanate] Other (comments)     allopecia    Codeine Unknown (comments)    Levofloxacin Other (comments)     HAIR LOSS    Nitrofurantoin Macrocrystalline Other (comments)     Flu like symptoms    Penicillins Other (comments)     \"It makes me cold and hot\"    Sweat, itch    Sulfa (Sulfonamide Antibiotics) Nausea Only    Sulfamethoxazole-Trimethoprim Other (comments)    Verapamil Other (comments)     Flu like symptoms   fatigue       Objective:  Visit Vitals  /80 (BP 1 Location: Left arm, BP Patient Position: Sitting)   Pulse 89   Resp 20   Ht 5' 6\" (1.676 m)   Wt 147 lb 9.6 oz (67 kg)   LMP  (LMP Unknown)   SpO2 97%   BMI 23.82 kg/m²      Constitutional: Well developed, nourished, no distress, alert   Eyes: Conjunctiva normal.   Eyelids normal.   CV: S1, S2.  RRR. No murmurs/rubs. No thrills palpated. No carotid bruits. Intact distal pulses. No edema. No aortic bruits. Pulm: No abnormalities on inspection. Clear to auscultation bilaterally.  No wheezing/rhonchi. Normal effort. GI: Soft, nontender, nondistended. Normal active bowel sounds.

## 2018-11-28 NOTE — PROGRESS NOTES
Tejinder Eng is a 69 y.o. female (: 1940) presenting to address:    Chief Complaint   Patient presents with    Abdominal Pain       Vitals:    18 1314   BP: 145/80   Pulse: 89   Resp: 20   SpO2: 97%   Weight: 147 lb 9.6 oz (67 kg)   Height: 5' 6\" (1.676 m)   PainSc:   5   PainLoc: Abdomen       Learning Assessment:     Learning Assessment 2018   PRIMARY LEARNER Patient   HIGHEST LEVEL OF EDUCATION - PRIMARY LEARNER  DID NOT GRADUATE HIGH SCHOOL   BARRIERS PRIMARY LEARNER LANGUAGE   CO-LEARNER CAREGIVER Yes   CO-LEARNER NAME daughter   Tiana Orozco LEVEL OF EDUCATION 4 YEARS OF COLLEGE   BARRIERS CO-LEARNER NONE   PRIMARY LANGUAGE Maltese   PRIMARY LANGUAGE CO-LEARNER ENGLISH    NEED No   LEARNER PREFERENCE PRIMARY LISTENING   LEARNER PREFERENCE CO-LEARNER LISTENING   LEARNING SPECIAL TOPICS no   ANSWERED BY self   RELATIONSHIP SELF     Depression Screening:     PHQ over the last two weeks 2018   Little interest or pleasure in doing things Not at all   Feeling down, depressed, irritable, or hopeless Not at all   Total Score PHQ 2 0     Fall Risk Assessment:     Fall Risk Assessment, last 12 mths 2018   Able to walk? Yes   Fall in past 12 months? No     Abuse Screening:     Abuse Screening Questionnaire 2018   Do you ever feel afraid of your partner? N   Are you in a relationship with someone who physically or mentally threatens you? N   Is it safe for you to go home? Y     Coordination of Care Questionaire:   1. Have you been to the ER, urgent care clinic since your last visit? Hospitalized since your last visit? NO    2. Have you seen or consulted any other health care providers outside of the [address] [place] [address] since your last visit? Include any pap smears or colon screening. YES cardiology 18, neurosurgeon 18, podiatry 18    Advanced Directive:   1. Do you have an Advanced Directive? YES    2.  Would you like information on Advanced Directives?  NO

## 2018-11-29 LAB
ALBUMIN SERPL-MCNC: 4.3 G/DL (ref 3.5–4.8)
ALBUMIN/GLOB SERPL: 1.7 {RATIO} (ref 1.2–2.2)
ALP SERPL-CCNC: 89 IU/L (ref 39–117)
ALT SERPL-CCNC: 19 IU/L (ref 0–32)
AST SERPL-CCNC: 22 IU/L (ref 0–40)
BILIRUB SERPL-MCNC: 0.3 MG/DL (ref 0–1.2)
BUN SERPL-MCNC: 17 MG/DL (ref 8–27)
BUN/CREAT SERPL: 25 (ref 12–28)
CALCIUM SERPL-MCNC: 9.4 MG/DL (ref 8.7–10.3)
CHLORIDE SERPL-SCNC: 99 MMOL/L (ref 96–106)
CHOLEST SERPL-MCNC: 151 MG/DL (ref 100–199)
CO2 SERPL-SCNC: 23 MMOL/L (ref 20–29)
CREAT SERPL-MCNC: 0.67 MG/DL (ref 0.57–1)
ERYTHROCYTE [DISTWIDTH] IN BLOOD BY AUTOMATED COUNT: 13.4 % (ref 12.3–15.4)
GLOBULIN SER CALC-MCNC: 2.6 G/DL (ref 1.5–4.5)
GLUCOSE SERPL-MCNC: 90 MG/DL (ref 65–99)
HCT VFR BLD AUTO: 40.7 % (ref 34–46.6)
HDLC SERPL-MCNC: 51 MG/DL
HGB BLD-MCNC: 13.1 G/DL (ref 11.1–15.9)
INTERPRETATION, 910389: NORMAL
LDLC SERPL CALC-MCNC: 79 MG/DL (ref 0–99)
MCH RBC QN AUTO: 28.1 PG (ref 26.6–33)
MCHC RBC AUTO-ENTMCNC: 32.2 G/DL (ref 31.5–35.7)
MCV RBC AUTO: 87 FL (ref 79–97)
MORPHOLOGY BLD-IMP: ABNORMAL
PLATELET # BLD AUTO: 94 X10E3/UL (ref 150–379)
POTASSIUM SERPL-SCNC: 3.7 MMOL/L (ref 3.5–5.2)
PROT SERPL-MCNC: 6.9 G/DL (ref 6–8.5)
RBC # BLD AUTO: 4.66 X10E6/UL (ref 3.77–5.28)
SODIUM SERPL-SCNC: 138 MMOL/L (ref 134–144)
TRIGL SERPL-MCNC: 103 MG/DL (ref 0–149)
VLDLC SERPL CALC-MCNC: 21 MG/DL (ref 5–40)
WBC # BLD AUTO: 8.4 X10E3/UL (ref 3.4–10.8)

## 2018-12-05 LAB
C DIFF TOX A+B STL QL IA: NORMAL
CAMPYLOBACTER STL CULT: NORMAL
E COLI SXT STL QL IA: NEGATIVE
SALM + SHIG STL CULT: NORMAL

## 2018-12-05 NOTE — PROGRESS NOTES
Daughter received results, will relay to pt. Asking if we can order h pylori or what to do with gas, pain and continued bloating.

## 2018-12-14 DIAGNOSIS — M54.16 CHRONIC LEFT LUMBAR RADICULOPATHY: ICD-10-CM

## 2019-01-07 ENCOUNTER — OFFICE VISIT (OUTPATIENT)
Dept: FAMILY MEDICINE CLINIC | Age: 79
End: 2019-01-07

## 2019-01-07 VITALS
HEART RATE: 87 BPM | OXYGEN SATURATION: 97 % | WEIGHT: 150.2 LBS | TEMPERATURE: 98 F | SYSTOLIC BLOOD PRESSURE: 122 MMHG | BODY MASS INDEX: 24.14 KG/M2 | RESPIRATION RATE: 18 BRPM | DIASTOLIC BLOOD PRESSURE: 80 MMHG | HEIGHT: 66 IN

## 2019-01-07 DIAGNOSIS — J01.00 ACUTE NON-RECURRENT MAXILLARY SINUSITIS: Primary | ICD-10-CM

## 2019-01-07 RX ORDER — DOXYCYCLINE 100 MG/1
100 CAPSULE ORAL 2 TIMES DAILY
Qty: 20 CAP | Refills: 0 | Status: SHIPPED | OUTPATIENT
Start: 2019-01-07 | End: 2019-01-17

## 2019-01-07 NOTE — PROGRESS NOTES
Jennifer Kang is a 69 y.o. female (: 1940) presenting to address: Patient has been using a dayan pot twice a day and in the morning, she \"has a lot of blood coming out. \" Patient states that she is dizzy and has a headache as well. Chief Complaint   Patient presents with    Sinus Pain       Vitals:    19 1324   BP: 122/80   Pulse: 87   Resp: 18   Temp: 98 °F (36.7 °C)   TempSrc: Oral   SpO2: 97%   Weight: 150 lb 3.2 oz (68.1 kg)   Height: 5' 6\" (1.676 m)   PainSc:   7   PainLoc: Head       Hearing/Vision:   No exam data present    Learning Assessment:     Learning Assessment 2018   PRIMARY LEARNER Patient   HIGHEST LEVEL OF EDUCATION - PRIMARY LEARNER  DID NOT GRADUATE HIGH SCHOOL   BARRIERS PRIMARY LEARNER LANGUAGE   CO-LEARNER CAREGIVER Yes   CO-LEARNER NAME daughter   Nelson Tomas LEVEL OF EDUCATION 4 YEARS OF COLLEGE   BARRIERS CO-LEARNER NONE   PRIMARY LANGUAGE Setswana   PRIMARY LANGUAGE CO-LEARNER ENGLISH    NEED No   LEARNER PREFERENCE PRIMARY LISTENING   LEARNER PREFERENCE CO-LEARNER LISTENING   LEARNING SPECIAL TOPICS no   ANSWERED BY self   RELATIONSHIP SELF     Depression Screening:     PHQ over the last two weeks 2018   Little interest or pleasure in doing things Not at all   Feeling down, depressed, irritable, or hopeless Not at all   Total Score PHQ 2 0     Fall Risk Assessment:     Fall Risk Assessment, last 12 mths 2018   Able to walk? Yes   Fall in past 12 months? No     Abuse Screening:     Abuse Screening Questionnaire 2018   Do you ever feel afraid of your partner? N   Are you in a relationship with someone who physically or mentally threatens you? N   Is it safe for you to go home? Y     Coordination of Care Questionaire:   1. Have you been to the ER, urgent care clinic since your last visit? Hospitalized since your last visit? NO    2.  Have you seen or consulted any other health care providers outside of the [address] [place] [address] since your last visit? Include any pap smears or colon screening. YES Hematologist    Advanced Directive:   1. Do you have an Advanced Directive? YES    2. Would you like information on Advanced Directives?  NO

## 2019-01-07 NOTE — PROGRESS NOTES
HISTORY OF PRESENT ILLNESS  Alexis Watson is a 69 y.o. female. HPI  C/o facial pain/pressure and congestion/postnasal drip for over 2 weeks, on flonase daily otc, not better  Review of Systems   Constitutional: Negative for chills and fever. HENT: Negative for ear pain and sore throat. Respiratory: Negative for cough and shortness of breath. Physical Exam   HENT:   Right Ear: Tympanic membrane and external ear normal.   Left Ear: Tympanic membrane and external ear normal.   Nose: Mucosal edema (Right>left) and rhinorrhea present. Right sinus exhibits maxillary sinus tenderness. Left sinus exhibits maxillary sinus tenderness. Mouth/Throat: Posterior oropharyngeal erythema present. No oropharyngeal exudate. Cardiovascular: Normal rate, regular rhythm and normal heart sounds. Pulmonary/Chest: Effort normal and breath sounds normal. She has no wheezes. She has no rales. Lymphadenopathy:     She has no cervical adenopathy. Vitals reviewed. ASSESSMENT and PLAN  Diagnoses and all orders for this visit:    1. Acute non-recurrent maxillary sinusitis, pt has multiple allergies to meds including Levaquin/PCN/Sulfa  -     doxycycline (VIBRAMYCIN) 100 mg capsule; Take 1 Cap by mouth two (2) times a day for 10 days.   - stop Ca/Vit D while on Doxy  Continue flonase

## 2019-02-15 ENCOUNTER — TELEPHONE (OUTPATIENT)
Dept: FAMILY MEDICINE CLINIC | Age: 79
End: 2019-02-15

## 2019-02-18 DIAGNOSIS — I10 ESSENTIAL HYPERTENSION: ICD-10-CM

## 2019-02-18 DIAGNOSIS — E03.9 ACQUIRED HYPOTHYROIDISM: ICD-10-CM

## 2019-02-18 DIAGNOSIS — E78.5 HYPERLIPIDEMIA, UNSPECIFIED HYPERLIPIDEMIA TYPE: ICD-10-CM

## 2019-02-20 RX ORDER — LEVOTHYROXINE SODIUM 88 UG/1
TABLET ORAL
Qty: 90 TAB | Refills: 1 | Status: SHIPPED | OUTPATIENT
Start: 2019-02-20 | End: 2019-03-26 | Stop reason: SDUPTHER

## 2019-02-20 RX ORDER — LOVASTATIN 20 MG/1
TABLET ORAL
Qty: 90 TAB | Refills: 1 | Status: SHIPPED | OUTPATIENT
Start: 2019-02-20 | End: 2019-03-26 | Stop reason: SDUPTHER

## 2019-02-20 RX ORDER — TRIAMTERENE AND HYDROCHLOROTHIAZIDE 37.5; 25 MG/1; MG/1
CAPSULE ORAL
Qty: 90 CAP | Refills: 1 | Status: SHIPPED | OUTPATIENT
Start: 2019-02-20 | End: 2019-03-26 | Stop reason: SDUPTHER

## 2019-02-20 RX ORDER — QUINAPRIL 40 MG/1
TABLET ORAL
Qty: 90 TAB | Refills: 1 | Status: SHIPPED | OUTPATIENT
Start: 2019-02-20 | End: 2019-03-26 | Stop reason: SDUPTHER

## 2019-02-26 ENCOUNTER — OFFICE VISIT (OUTPATIENT)
Dept: FAMILY MEDICINE CLINIC | Age: 79
End: 2019-02-26

## 2019-02-26 ENCOUNTER — DOCUMENTATION ONLY (OUTPATIENT)
Dept: FAMILY MEDICINE CLINIC | Age: 79
End: 2019-02-26

## 2019-02-26 VITALS
WEIGHT: 147 LBS | TEMPERATURE: 98.4 F | HEART RATE: 82 BPM | BODY MASS INDEX: 23.63 KG/M2 | HEIGHT: 66 IN | SYSTOLIC BLOOD PRESSURE: 130 MMHG | RESPIRATION RATE: 20 BRPM | DIASTOLIC BLOOD PRESSURE: 70 MMHG | OXYGEN SATURATION: 97 %

## 2019-02-26 DIAGNOSIS — H65.93 MIDDLE EAR EFFUSION, BILATERAL: ICD-10-CM

## 2019-02-26 DIAGNOSIS — J01.00 ACUTE NON-RECURRENT MAXILLARY SINUSITIS: ICD-10-CM

## 2019-02-26 DIAGNOSIS — J40 BRONCHITIS: Primary | ICD-10-CM

## 2019-02-26 DIAGNOSIS — J45.21 MILD INTERMITTENT REACTIVE AIRWAY DISEASE WITH WHEEZING WITH ACUTE EXACERBATION: ICD-10-CM

## 2019-02-26 RX ORDER — CODEINE PHOSPHATE AND GUAIFENESIN 10; 100 MG/5ML; MG/5ML
5 SOLUTION ORAL
Qty: 110 ML | Refills: 0 | Status: SHIPPED | OUTPATIENT
Start: 2019-02-26 | End: 2019-02-28 | Stop reason: SDUPTHER

## 2019-02-26 RX ORDER — PREDNISONE 20 MG/1
40 TABLET ORAL
Qty: 10 TAB | Refills: 0 | Status: SHIPPED | OUTPATIENT
Start: 2019-02-26 | End: 2019-02-28 | Stop reason: SINTOL

## 2019-02-26 RX ORDER — ALBUTEROL SULFATE 90 UG/1
2 AEROSOL, METERED RESPIRATORY (INHALATION)
Qty: 1 INHALER | Refills: 0 | Status: SHIPPED | OUTPATIENT
Start: 2019-02-26 | End: 2019-03-26 | Stop reason: ALTCHOICE

## 2019-02-26 RX ORDER — BENZONATATE 200 MG/1
200 CAPSULE ORAL
Qty: 60 CAP | Refills: 0 | Status: SHIPPED | OUTPATIENT
Start: 2019-02-26 | End: 2019-02-28 | Stop reason: SDUPTHER

## 2019-02-26 NOTE — PROGRESS NOTES
Chief Complaint   Patient presents with    Cough       Assessment/Plan  1. Bronchitis  -s/p 2 rounds of doxy. Suspect biggest contributor is RAD. Ck xray. Start mucinex. - XR CHEST PA LAT; Future  -tessalon per pt request    2. Mild intermittent reactive airway disease with wheezing with acute exacerbation  -declines steroids b/c \"it makes her crazy\". Albuterol.   - predniSONE (DELTASONE) 20 mg tablet; Take 40 mg by mouth daily (with breakfast). Dispense: 10 Tab; Refill: 0  - albuterol (PROVENTIL HFA, VENTOLIN HFA, PROAIR HFA) 90 mcg/actuation inhaler; Take 2 Puffs by inhalation every four (4) hours as needed for Wheezing. Dispense: 1 Inhaler; Refill: 0    3. Sinus inflammation and middle ear effusion - start flonase    The plan was discussed with the patient. The patient verbalized understanding and is in agreement with the plan. All medication potential side effects were discussed with the patient. SUBJECTIVE:   Colleen Pittman is a 69 y.o. female who complains of productive cough, nasal congestion, facial pain, HA. Was seen 1/7/19 by Dr. Colin Hobbs and dx with sinusitis, treated with doxy. Then 10days ago was dx with sinuitis at urgent care and treated with doxy. Flu test was reportedly negative. She is doing netti lee. +subjective fevers.     Review of Systems - ENT ROS: positive for - nasal congestion  Respiratory ROS: positive for - cough  Cardiovascular ROS: no chest pain or dyspnea on exertion  Gastrointestinal ROS: no abdominal pain, change in bowel habits, or black or bloody stools  Musculoskeletal ROS: negative  Neurological ROS: negative    Physical Examination:   Visit Vitals  /70 (BP 1 Location: Left arm, BP Patient Position: Sitting)   Pulse 82   Temp 98.4 °F (36.9 °C) (Oral)   Resp 20   Ht 5' 6\" (1.676 m)   Wt 147 lb (66.7 kg)   LMP  (LMP Unknown)   SpO2 97%   BMI 23.73 kg/m²       Constitutional: Well developed, nourished, no distress, alert   HENT: Exterior ears and tympanic membranes normal bilaterally. Supple neck. No thyromegaly or lymphadenopathy. Oropharynx clear and moist mucous membranes. +sinus inflammation inferior turbinate on L.+bilat middle ear effusion. Eyes: Conjunctiva normal. PERRL. CV: S1, S2.  RRR. No murmurs/rubs. No thrills palpated. No carotid bruits. Intact distal pulses. No edema. Pulm: No abnormalities on inspection. +wheezing and rhonchi bilat. Normal effort.              Emerita Lubin MD

## 2019-02-26 NOTE — PROGRESS NOTES
Salvatore Mckeon is a 69 y.o. female (: 1940) presenting to address:    Chief Complaint   Patient presents with    Cough       Vitals:    19 1252   BP: 130/70   Pulse: 82   Resp: 20   Temp: 98.4 °F (36.9 °C)   TempSrc: Oral   SpO2: 97%   Weight: 147 lb (66.7 kg)   Height: 5' 6\" (1.676 m)   PainSc:   0 - No pain       Learning Assessment:     Learning Assessment 2018   PRIMARY LEARNER Patient   HIGHEST LEVEL OF EDUCATION - PRIMARY LEARNER  DID NOT GRADUATE HIGH SCHOOL   BARRIERS PRIMARY LEARNER LANGUAGE   CO-LEARNER CAREGIVER Yes   CO-LEARNER NAME daughter   Candida Alcaraz LEVEL OF EDUCATION 4 YEARS OF COLLEGE   BARRIERS CO-LEARNER NONE   PRIMARY LANGUAGE [place]   PRIMARY LANGUAGE CO-LEARNER ENGLISH    NEED No   LEARNER PREFERENCE PRIMARY LISTENING   LEARNER PREFERENCE CO-LEARNER LISTENING   LEARNING SPECIAL TOPICS no   ANSWERED BY self   RELATIONSHIP SELF     Depression Screening:     3 most recent PHQ Screens 2019   Little interest or pleasure in doing things Not at all   Feeling down, depressed, irritable, or hopeless Not at all   Total Score PHQ 2 0     Fall Risk Assessment:     Fall Risk Assessment, last 12 mths 2018   Able to walk? Yes   Fall in past 12 months? No     Abuse Screening:     Abuse Screening Questionnaire 2018   Do you ever feel afraid of your partner? N   Are you in a relationship with someone who physically or mentally threatens you? N   Is it safe for you to go home? Y     Coordination of Care Questionaire:   1. Have you been to the ER, urgent care clinic since your last visit? Hospitalized since your last visit? YES patient first 2 weeks ago    2. Have you seen or consulted any other health care providers outside of the [address] [place] [address] since your last visit? Include any pap smears or colon screening. NO    Advanced Directive:   1. Do you have an Advanced Directive? YES    2. Would you like information on Advanced Directives?  NO

## 2019-02-28 ENCOUNTER — TELEPHONE (OUTPATIENT)
Dept: FAMILY MEDICINE CLINIC | Age: 79
End: 2019-02-28

## 2019-02-28 DIAGNOSIS — J45.21 MILD INTERMITTENT REACTIVE AIRWAY DISEASE WITH WHEEZING WITH ACUTE EXACERBATION: ICD-10-CM

## 2019-02-28 DIAGNOSIS — J40 BRONCHITIS: ICD-10-CM

## 2019-02-28 RX ORDER — PREDNISONE 20 MG/1
40 TABLET ORAL
Qty: 10 TAB | Refills: 0 | Status: SHIPPED | OUTPATIENT
Start: 2019-02-28 | End: 2019-03-26 | Stop reason: ALTCHOICE

## 2019-02-28 RX ORDER — CODEINE PHOSPHATE AND GUAIFENESIN 10; 100 MG/5ML; MG/5ML
5 SOLUTION ORAL
Qty: 110 ML | Refills: 0 | Status: SHIPPED | OUTPATIENT
Start: 2019-02-28 | End: 2019-03-07

## 2019-02-28 RX ORDER — BENZONATATE 200 MG/1
200 CAPSULE ORAL
Qty: 90 CAP | Refills: 0 | Status: SHIPPED | OUTPATIENT
Start: 2019-02-28 | End: 2019-03-26 | Stop reason: ALTCHOICE

## 2019-02-28 NOTE — TELEPHONE ENCOUNTER
Tessalon sent to pharmacy. She had a lot of bronchospasm on exam.  Needs either prednisone or albuterol inhaler.   Inhaler would be ideal.

## 2019-02-28 NOTE — TELEPHONE ENCOUNTER
Spoke to pt, gave her results there was no pneumonia. She states the inhaler was $90 so she only picked up 10 Tessalon tabs. I gave her information for goodrx card online. She will look into it. She is asking if you can send Tessalon 90 tabs to mail order. If she can print the goodrx card she may need all 4 scripts in hand to take around to the cheapest pharmacies. She also is asking for an alternative for prednisone. She said when she was on high doses of prednisone it made her shaky and depressed, crying easily.

## 2019-02-28 NOTE — TELEPHONE ENCOUNTER
Spoke to pt. She said her daughter got all the good Rx coupons and these scripts won't cost that much at [facility]. She will take all her meds.

## 2019-03-22 DIAGNOSIS — E03.9 ACQUIRED HYPOTHYROIDISM: ICD-10-CM

## 2019-03-22 DIAGNOSIS — E78.00 PURE HYPERCHOLESTEROLEMIA: ICD-10-CM

## 2019-03-22 DIAGNOSIS — D69.3 CHRONIC ITP (IDIOPATHIC THROMBOCYTOPENIA) (HCC): Primary | Chronic | ICD-10-CM

## 2019-03-23 LAB
ALBUMIN SERPL-MCNC: 4.4 G/DL (ref 3.5–4.8)
ALBUMIN/GLOB SERPL: 1.8 {RATIO} (ref 1.2–2.2)
ALP SERPL-CCNC: 92 IU/L (ref 39–117)
ALT SERPL-CCNC: 18 IU/L (ref 0–32)
AST SERPL-CCNC: 22 IU/L (ref 0–40)
BILIRUB SERPL-MCNC: 0.3 MG/DL (ref 0–1.2)
BUN SERPL-MCNC: 22 MG/DL (ref 8–27)
BUN/CREAT SERPL: 28 (ref 12–28)
CALCIUM SERPL-MCNC: 9.6 MG/DL (ref 8.7–10.3)
CHLORIDE SERPL-SCNC: 105 MMOL/L (ref 96–106)
CHOLEST SERPL-MCNC: 158 MG/DL (ref 100–199)
CO2 SERPL-SCNC: 24 MMOL/L (ref 20–29)
CREAT SERPL-MCNC: 0.79 MG/DL (ref 0.57–1)
ERYTHROCYTE [DISTWIDTH] IN BLOOD BY AUTOMATED COUNT: 13.7 % (ref 12.3–15.4)
GLOBULIN SER CALC-MCNC: 2.4 G/DL (ref 1.5–4.5)
GLUCOSE SERPL-MCNC: 100 MG/DL (ref 65–99)
HCT VFR BLD AUTO: 41.6 % (ref 34–46.6)
HDLC SERPL-MCNC: 61 MG/DL
HGB BLD-MCNC: 13.5 G/DL (ref 11.1–15.9)
INTERPRETATION, 910389: NORMAL
LDLC SERPL CALC-MCNC: 84 MG/DL (ref 0–99)
MCH RBC QN AUTO: 28.7 PG (ref 26.6–33)
MCHC RBC AUTO-ENTMCNC: 32.5 G/DL (ref 31.5–35.7)
MCV RBC AUTO: 89 FL (ref 79–97)
MORPHOLOGY BLD-IMP: ABNORMAL
PLATELET # BLD AUTO: 91 X10E3/UL (ref 150–379)
POTASSIUM SERPL-SCNC: 4.1 MMOL/L (ref 3.5–5.2)
PROT SERPL-MCNC: 6.8 G/DL (ref 6–8.5)
RBC # BLD AUTO: 4.7 X10E6/UL (ref 3.77–5.28)
SODIUM SERPL-SCNC: 144 MMOL/L (ref 134–144)
TRIGL SERPL-MCNC: 66 MG/DL (ref 0–149)
TSH SERPL DL<=0.005 MIU/L-ACNC: 1.47 UIU/ML (ref 0.45–4.5)
VLDLC SERPL CALC-MCNC: 13 MG/DL (ref 5–40)
WBC # BLD AUTO: 5.6 X10E3/UL (ref 3.4–10.8)

## 2019-03-26 ENCOUNTER — OFFICE VISIT (OUTPATIENT)
Dept: FAMILY MEDICINE CLINIC | Age: 79
End: 2019-03-26

## 2019-03-26 VITALS
RESPIRATION RATE: 20 BRPM | WEIGHT: 147 LBS | HEIGHT: 66 IN | SYSTOLIC BLOOD PRESSURE: 130 MMHG | BODY MASS INDEX: 23.63 KG/M2 | HEART RATE: 76 BPM | OXYGEN SATURATION: 97 % | TEMPERATURE: 98.4 F | DIASTOLIC BLOOD PRESSURE: 72 MMHG

## 2019-03-26 DIAGNOSIS — E78.5 HYPERLIPIDEMIA, UNSPECIFIED HYPERLIPIDEMIA TYPE: ICD-10-CM

## 2019-03-26 DIAGNOSIS — D69.3 CHRONIC ITP (IDIOPATHIC THROMBOCYTOPENIA) (HCC): ICD-10-CM

## 2019-03-26 DIAGNOSIS — I10 ESSENTIAL HYPERTENSION: ICD-10-CM

## 2019-03-26 DIAGNOSIS — E03.9 ACQUIRED HYPOTHYROIDISM: ICD-10-CM

## 2019-03-26 DIAGNOSIS — Z00.00 MEDICARE ANNUAL WELLNESS VISIT, SUBSEQUENT: ICD-10-CM

## 2019-03-26 DIAGNOSIS — J30.1 NON-SEASONAL ALLERGIC RHINITIS DUE TO POLLEN: ICD-10-CM

## 2019-03-26 DIAGNOSIS — R51.9 CHRONIC DAILY HEADACHE: Primary | ICD-10-CM

## 2019-03-26 RX ORDER — LOVASTATIN 20 MG/1
20 TABLET ORAL DAILY
Qty: 90 TAB | Refills: 3 | Status: SHIPPED | OUTPATIENT
Start: 2019-03-26

## 2019-03-26 RX ORDER — HYDRALAZINE HYDROCHLORIDE 25 MG/1
25 TABLET, FILM COATED ORAL 2 TIMES DAILY
Qty: 180 TAB | Refills: 3 | Status: SHIPPED | OUTPATIENT
Start: 2019-03-26

## 2019-03-26 RX ORDER — MINERAL OIL
180 ENEMA (ML) RECTAL
Qty: 90 TAB | Refills: 3 | Status: SHIPPED | OUTPATIENT
Start: 2019-03-26 | End: 2022-09-23

## 2019-03-26 RX ORDER — TRIAMTERENE AND HYDROCHLOROTHIAZIDE 37.5; 25 MG/1; MG/1
1 CAPSULE ORAL DAILY
Qty: 90 CAP | Refills: 3 | Status: SHIPPED | OUTPATIENT
Start: 2019-03-26

## 2019-03-26 RX ORDER — LEVOTHYROXINE SODIUM 88 UG/1
88 TABLET ORAL
Qty: 90 TAB | Refills: 3 | Status: SHIPPED | OUTPATIENT
Start: 2019-03-26

## 2019-03-26 RX ORDER — MONTELUKAST SODIUM 10 MG/1
10 TABLET ORAL DAILY
Qty: 90 TAB | Refills: 3 | Status: SHIPPED | OUTPATIENT
Start: 2019-03-26

## 2019-03-26 RX ORDER — QUINAPRIL 40 MG/1
40 TABLET ORAL DAILY
Qty: 90 TAB | Refills: 3 | Status: SHIPPED | OUTPATIENT
Start: 2019-03-26 | End: 2022-09-23

## 2019-03-26 RX ORDER — FLUTICASONE PROPIONATE 50 MCG
2 SPRAY, SUSPENSION (ML) NASAL DAILY
Qty: 3 BOTTLE | Refills: 3 | Status: SHIPPED | OUTPATIENT
Start: 2019-03-26

## 2019-03-26 RX ORDER — AZELASTINE HYDROCHLORIDE 0.5 MG/ML
1 SOLUTION/ DROPS OPHTHALMIC 2 TIMES DAILY
Qty: 18 ML | Refills: 3 | Status: SHIPPED | OUTPATIENT
Start: 2019-03-26 | End: 2019-04-08

## 2019-03-26 RX ORDER — OMEPRAZOLE 40 MG/1
40 CAPSULE, DELAYED RELEASE ORAL
Qty: 90 CAP | Refills: 3 | OUTPATIENT
Start: 2019-03-26 | End: 2022-02-04

## 2019-03-26 NOTE — PROGRESS NOTES
Assessment/Plan:    1. Chronic daily headache  -Ck CT head given unresponsive to typical symptoms  - CT HEAD WO CONT; Future    2. Medicare annual wellness visit, subsequent    3. Essential hypertension  -bp good. - triamterene-hydroCHLOROthiazide (DYAZIDE) 37.5-25 mg per capsule; Take 1 Cap by mouth daily. Dispense: 90 Cap; Refill: 3  - quinapril (ACCUPRIL) 40 mg tablet; Take 1 Tab by mouth daily. Dispense: 90 Tab; Refill: 3    4. Acquired hypothyroidism  -refilled  - levothyroxine (SYNTHROID) 88 mcg tablet; Take 1 Tab by mouth Daily (before breakfast). Dispense: 90 Tab; Refill: 3    5. Hyperlipidemia, unspecified hyperlipidemia type  -refilled  - lovastatin (MEVACOR) 20 mg tablet; Take 1 Tab by mouth daily. Dispense: 90 Tab; Refill: 3    6. Non-seasonal allergic rhinitis due to pollen  -refilled  - montelukast (SINGULAIR) 10 mg tablet; Take 1 Tab by mouth daily. Dispense: 90 Tab; Refill: 3  - fluticasone propionate (FLONASE) 50 mcg/actuation nasal spray; 2 Sprays by Both Nostrils route daily. Indications: Chronic Inflammation of the Nose Not due to Allergies  Dispense: 3 Bottle; Refill: 3  - fexofenadine (ALLEGRA) 180 mg tablet; Take 1 Tab by mouth daily as needed for Allergies. Dispense: 90 Tab; Refill: 3    7. Chronic ITP (idiopathic thrombocytopenia) (HCC)  -stable platelets. The plan was discussed with the patient. The patient verbalized understanding and is in agreement with the plan. All medication potential side effects were discussed with the patient.     Health Maintenance:   Health Maintenance   Topic Date Due    Shingrix Vaccine Age 49> (1 of 2) 06/05/1991    DTaP/Tdap/Td series (1 - Tdap) 03/26/2020 (Originally [birth year]/1962)    MEDICARE YEARLY EXAM  03/26/2020    GLAUCOMA SCREENING Q2Y  03/14/2021    Influenza Age 5 to Adult  Completed    Pneumococcal 65+ years  Completed    Bone Densitometry (Dexa) Screening  Addressed       Jennifer Kang is a 69 y.o. female and presents with Results and New Order (CT scan)     Subjective:  Pt c/o daily HA. She is requesting CT scan. It's bifrontal.  Unable to describe quality. States it feels like a burning sensation. ROS:  Constitutional: No recent weight change. No weakness/fatigue. No f/c. Skin: No rashes, change in nails/hair, itching   HENT: + HA, no dizziness. No hearing loss/tinnitus. No nasal congestion/discharge. Eyes: No change in vision, double/blurred vision or eye pain/redness. Cardiovascular: No CP/palpitations. No GALDAMEZ/orthopnea/PND. Respiratory: No cough/sputum, dyspnea, wheezing. Gastointestinal: No dysphagia, reflux. No n/v. No constipation/diarrhea. No melena/rectal bleeding. Genitourinary: No dysuria, urinary hesitancy, nocturia, hematuria. No incontinence. Musculoskeletal: + joint pain/stiffness. No muscle pain/tenderness. Endo: No heat/cold intolerance, no polyuria/polydypsia. Heme: No h/o anemia. No easy bleeding/bruising. Allergy/Immunology: + seasonal rhinitis. Denies frequent colds, sinus/ear infections. Neurological: No seizures/numbness/weakness. No paresthesias. Psychiatric:  No depression, anxiety. The problem list was updated as a part of today's visit. Patient Active Problem List   Diagnosis Code    Essential hypertension I10    Hyperlipidemia E78.5    Acquired hypothyroidism E03.9    Impingement syndrome of right shoulder M75.41    Primary insomnia F51.01    Chronic [facility]idiopathic thrombocytopenia) ([facility]) D69.3    GERD (gastroesophageal reflux disease) K21.9    Sensorineural hearing loss, bilateral H90.3    Arthritis of right shoulder region M19.011    [place]se speaking patient [id number]    DDD (degenerative disc disease), cervical M50.30    Headache, chronic daily R51    Osteopenia of multiple sites M85.89    Non-seasonal allergic rhinitis due to pollen J30.1       The PSH, FH were reviewed.     SH:  Social History     Tobacco Use    Smoking status: Former Smoker Types: Cigarettes    Smokeless tobacco: Never Used    Tobacco comment: quit 1980    Substance Use Topics    Alcohol use: Yes     Alcohol/week: 0.5 oz     Types: 1 Glasses of wine per week     Comment: socially once weekly    Drug use: No       Medications/Allergies:  Current Outpatient Medications on File Prior to Visit   Medication Sig Dispense Refill    hydrALAZINE (APRESOLINE) 25 mg tablet Take 25 mg by mouth two (2) times a day. 3    therapeutic multivitamin (THERAGRAN) tablet Take 1 Tab by mouth daily.  calcium-cholecalciferol, d3, (CALCIUM 600 + D) 600-125 mg-unit tab Take 1 Tab by mouth two (2) times a day.  ascorbic acid, vitamin C, (VITAMIN C) 500 mg tablet Take 500 mg by mouth daily.  DOCOSAHEXANOIC ACID/EPA (FISH OIL PO) Take 1 Cap by mouth daily.  MSM-Ubitricity Q10-herb no. 226 [id number] mg tab Take 1 Tab by mouth daily.  b complex vitamins tablet Take 1 Tab by mouth daily.  acetaminophen (TYLENOL) 325 mg tablet Take 325 mg by mouth every four (4) hours as needed for Pain.  [DISCONTINUED] triamterene-hydroCHLOROthiazide (DYAZIDE) 37.5-25 mg per capsule TAKE 1 CAPSULE EVERY DAY  FOR  HYPERTENSION 90 Cap 1    [DISCONTINUED] quinapril (ACCUPRIL) 40 mg tablet TAKE 1 TABLET EVERY DAY FOR HYPERTENSION 90 Tab 1    [DISCONTINUED] levothyroxine (SYNTHROID) 88 mcg tablet TAKE 1 TABLET EVERY DAY  FOR  HYPOTHYROIDISM 90 Tab 1     No current facility-administered medications on file prior to visit.          Allergies   Allergen Reactions    Amlodipine Swelling     Edema of feet/ankles    Amlodipine-Benazepril Swelling    Augmentin [Amoxicillin-Pot Clavulanate] Other (comments)     allopecia    Codeine Unknown (comments)    Levofloxacin Other (comments)     HAIR LOSS    Nitrofurantoin Macrocrystalline Other (comments)     Flu like symptoms    Penicillins Other (comments)     \"It makes me cold and hot\"    Sweat, itch    Sulfa (Sulfonamide Antibiotics) Nausea Only    Sulfamethoxazole-Trimethoprim Other (comments)    Verapamil Other (comments)     Flu like symptoms   fatigue       Objective:  Visit Vitals  /72 (BP 1 Location: Left arm, BP Patient Position: Sitting)   Pulse 76   Temp 98.4 °F (36.9 °C) (Oral)   Resp 20   Ht 5' 6\" (1.676 m)   Wt 147 lb (66.7 kg)   LMP  (LMP Unknown)   SpO2 97%   BMI 23.73 kg/m²      Constitutional: Well developed, nourished, no distress, alert, obese habitus   HENT: Exterior ears and tympanic membranes normal bilaterally. Supple neck. No thyromegaly or lymphadenopathy. Oropharynx clear and moist mucous membranes. Normal inferior turbinates. Septum midline. Eyes: Conjunctiva normal. PERRL. Eyelids normal.   CV: S1, S2.  RRR. No murmurs/rubs. No thrills palpated. No carotid bruits. Intact distal pulses. No edema. No aortic bruits. Pulm: No abnormalities on inspection. Clear to auscultation bilaterally. No wheezing/rhonchi. Normal effort. Neuro: A/O x 3. No focal motor or sensory deficits. Speech normal.     Labwork and Ancillary Studies:    CBC w/Diff  Lab Results   Component Value Date/Time    WBC 5.6 03/22/2019 08:05 AM    HGB 13.5 03/22/2019 08:05 AM    PLATELET 91 (LL) 01/83/0036 08:05 AM         Basic Metabolic Profile/LFTs  Lab Results   Component Value Date/Time    Sodium 144 03/22/2019 08:05 AM    Potassium 4.1 03/22/2019 08:05 AM    Chloride 105 03/22/2019 08:05 AM    CO2 24 03/22/2019 08:05 AM    Anion gap 10 06/08/2010 08:49 AM    Glucose 100 (H) 03/22/2019 08:05 AM    BUN 22 03/22/2019 08:05 AM    Creatinine 0.79 03/22/2019 08:05 AM    BUN/Creatinine ratio 28 03/22/2019 08:05 AM    GFR est AA 84 03/22/2019 08:05 AM    GFR est non-AA 72 03/22/2019 08:05 AM    Calcium 9.6 03/22/2019 08:05 AM      Lab Results   Component Value Date/Time    ALT (SGPT) 18 03/22/2019 08:05 AM    AST (SGOT) 22 03/22/2019 08:05 AM    Alk.  phosphatase 92 03/22/2019 08:05 AM    Bilirubin, total 0.3 03/22/2019 08:05 AM Cholesterol  Lab Results   Component Value Date/Time    Cholesterol, total 158 03/22/2019 08:05 AM    HDL Cholesterol 61 03/22/2019 08:05 AM    LDL, calculated 84 03/22/2019 08:05 AM    Triglyceride 66 03/22/2019 08:05 AM    CHOL/HDL Ratio 2.8 06/08/2010 08:49 AM           This is the Subsequent Medicare Annual Wellness Exam, performed 12 months or more after the Initial AWV or the last Subsequent AWV    I have reviewed the patient's medical history in detail and updated the computerized patient record. History     Past Medical History:   Diagnosis Date    Acid indigestion     Essential hypertension, benign     History of EMG 12/2017    Normal study without electrical evidence of a focal / peripheral neuropathy of C5-T1 radiculopathy affecting the R arm.  Sinus problem     Unspecified hypothyroidism       Past Surgical History:   Procedure Laterality Date    HX BACK SURGERY      disc    HX PARTIAL HYSTERECTOMY      HX SHOULDER ARTHROSCOPY Right 03/10/2017    subA decompression, distal clavicle excision, partial thickness cuff tear debridement     Current Outpatient Medications   Medication Sig Dispense Refill    triamterene-hydroCHLOROthiazide (DYAZIDE) 37.5-25 mg per capsule TAKE 1 CAPSULE EVERY DAY  FOR  HYPERTENSION 90 Cap 1    quinapril (ACCUPRIL) 40 mg tablet TAKE 1 TABLET EVERY DAY FOR HYPERTENSION 90 Tab 1    levothyroxine (SYNTHROID) 88 mcg tablet TAKE 1 TABLET EVERY DAY  FOR  HYPOTHYROIDISM 90 Tab 1    lovastatin (MEVACOR) 20 mg tablet TAKE 1 TABLET EVERY DAY BEFORE DINNER FOR CHOLESTEROL  90 Tab 1    hydrALAZINE (APRESOLINE) 25 mg tablet Take 25 mg by mouth two (2) times a day. 3    montelukast (SINGULAIR) 10 mg tablet Take 1 Tab by mouth daily. 90 Tab 1    fluticasone (FLONASE) 50 mcg/actuation nasal spray 2 Sprays by Both Nostrils route daily. Indications: CHRONIC NON-ALLERGIC RHINITIS 3 Bottle 3    omeprazole (PRILOSEC) 40 mg capsule Take 1 Cap by mouth daily.  (Patient taking differently: Take 40 mg by mouth daily as needed.) 90 Cap 3    fexofenadine (ALLEGRA) 180 mg tablet Take 1 Tab by mouth daily as needed for Allergies. 90 Tab 3    therapeutic multivitamin (THERAGRAN) tablet Take 1 Tab by mouth daily.  calcium-cholecalciferol, d3, (CALCIUM 600 + D) 600-125 mg-unit tab Take 1 Tab by mouth two (2) times a day.  ascorbic acid, vitamin C, (VITAMIN C) 500 mg tablet Take 500 mg by mouth daily.  DOCOSAHEXANOIC ACID/EPA (FISH OIL PO) Take 1 Cap by mouth daily.  MSM-Virool Q10-herb no. [phone] mg tab Take 1 Tab by mouth daily.  b complex vitamins tablet Take 1 Tab by mouth daily.  acetaminophen (TYLENOL) 325 mg tablet Take 325 mg by mouth every four (4) hours as needed for Pain.  benzonatate (TESSALON) 200 mg capsule Take 1 Cap by mouth three (3) times daily as needed for Cough. 90 Cap 0    predniSONE (DELTASONE) 20 mg tablet Take 40 mg by mouth daily (with breakfast). 10 Tab 0    albuterol (PROVENTIL HFA, VENTOLIN HFA, PROAIR HFA) 90 mcg/actuation inhaler Take 2 Puffs by inhalation every four (4) hours as needed for Wheezing. 1 Inhaler 0    traZODone (DESYREL) 50 mg tablet Take 1 Tab by mouth nightly as needed for Sleep.  90 Tab 1     Allergies   Allergen Reactions    Amlodipine Swelling     Edema of feet/ankles    Amlodipine-Benazepril Swelling    Augmentin [Amoxicillin-Pot Clavulanate] Other (comments)     allopecia    Codeine Unknown (comments)    Levofloxacin Other (comments)     HAIR LOSS    Nitrofurantoin Macrocrystalline Other (comments)     Flu like symptoms    Penicillins Other (comments)     \"It makes me cold and hot\"    Sweat, itch    Sulfa (Sulfonamide Antibiotics) Nausea Only    Sulfamethoxazole-Trimethoprim Other (comments)    Verapamil Other (comments)     Flu like symptoms   fatigue     Family History   Problem Relation Age of Onset    Stroke Mother     Hypertension Mother     Cancer Father     Cancer Sister     Cancer Brother      Social History     Tobacco Use    Smoking status: Former Smoker     Types: Cigarettes    Smokeless tobacco: Never Used    Tobacco comment: quit 1980    Substance Use Topics    Alcohol use: Yes     Alcohol/week: 0.5 oz     Types: 1 Glasses of wine per week     Comment: socially once weekly     Patient Active Problem List   Diagnosis Code    Essential hypertension I10    Hyperlipidemia E78.5    Acquired hypothyroidism E03.9    Impingement syndrome of right shoulder M75.41    Primary insomnia F51.01    Chronic [facility]P [facility]idiopathic thrombocytopenia) ([facility]) D69.3    GERD (gastroesophageal reflux disease) K21.9    Sensorineural hearing loss, bilateral H90.3    Arthritis of right shoulder region M19.011    [place] speaking patient [id number]    DDD (degenerative disc disease), cervical M50.30    Headache, chronic daily R51    Osteopenia of multiple sites M85.89    Non-seasonal allergic rhinitis due to pollen J30.1       Depression Risk Factor Screening:     3 most recent PHQ Screens 3/26/2019   Little interest or pleasure in doing things Not at all   Feeling down, depressed, irritable, or hopeless Not at all   Total Score PHQ 2 0     Alcohol Risk Factor Screening: You do not drink alcohol or very rarely. Functional Ability and Level of Safety:   Hearing Loss  Hearing is good. Activities of Daily Living  The home contains: no safety equipment. Patient does total self care    Fall Risk  Fall Risk Assessment, last 12 mths 3/26/2019   Able to walk? Yes   Fall in past 12 months?  No       Abuse Screen  Patient is not abused    Cognitive Screening   Evaluation of Cognitive Function:  Has your family/caregiver stated any concerns about your memory: no  Normal    Patient Care Team   Patient Care Team:  Ajay Beach MD as PCP - General (Internal Medicine)  Keila Gonzalez MD as Consulting Provider (Hematology and Oncology)  Manjeet Luna MD as Consulting Provider (Dermatology)  Myrna Alexander MD as Consulting Provider (Otolaryngology)  Eriberto Donnelly MD as Consulting Provider (Cardiology)    Assessment/Plan   Education and counseling provided:  Are appropriate based on today's review and evaluation  End-of-Life planning (with patient's consent)    Diagnoses and all orders for this visit:    1. Medicare annual wellness visit, subsequent    2. Essential hypertension  -     triamterene-hydroCHLOROthiazide (DYAZIDE) 37.5-25 mg per capsule; Take 1 Cap by mouth daily. -     quinapril (ACCUPRIL) 40 mg tablet; Take 1 Tab by mouth daily. 3. Acquired hypothyroidism  -     levothyroxine (SYNTHROID) 88 mcg tablet; Take 1 Tab by mouth Daily (before breakfast). 4. Hyperlipidemia, unspecified hyperlipidemia type  -     lovastatin (MEVACOR) 20 mg tablet; Take 1 Tab by mouth daily. 5. Non-seasonal allergic rhinitis due to pollen  -     montelukast (SINGULAIR) 10 mg tablet; Take 1 Tab by mouth daily. -     fluticasone propionate (FLONASE) 50 mcg/actuation nasal spray; 2 Sprays by Both Nostrils route daily. Indications: Chronic Inflammation of the Nose Not due to Allergies  -     fexofenadine (ALLEGRA) 180 mg tablet; Take 1 Tab by mouth daily as needed for Allergies. 6. Chronic daily headache  -     CT HEAD WO CONT; Future    Other orders  -     omeprazole (PRILOSEC) 40 mg capsule; Take 1 Cap by mouth daily as needed (reflux).         Health Maintenance Due   Topic Date Due    Shingrix Vaccine Age 49> (1 of 2) 06/05/1991

## 2019-03-26 NOTE — PROGRESS NOTES
Vanessa Vazquez is a 69 y.o. female (: 1940) presenting to address:    Chief Complaint   Patient presents with    Results    New Order     CT scan       Vitals:    19 1329   BP: 130/72   Pulse: 76   Resp: 20   Temp: 98.4 °F (36.9 °C)   TempSrc: Oral   SpO2: 97%   Weight: 147 lb (66.7 kg)   Height: 5' 6\" (1.676 m)   PainSc:   8   PainLoc: Foot       Learning Assessment:     Learning Assessment 2018   PRIMARY LEARNER Patient   HIGHEST LEVEL OF EDUCATION - PRIMARY LEARNER  DID NOT GRADUATE HIGH SCHOOL   BARRIERS PRIMARY LEARNER LANGUAGE   CO-LEARNER CAREGIVER Yes   CO-LEARNER NAME daughter   Prema Oliver LEVEL OF EDUCATION 4 YEARS OF COLLEGE   BARRIERS CO-LEARNER NONE   PRIMARY LANGUAGE Irish   PRIMARY LANGUAGE CO-LEARNER ENGLISH    NEED No   LEARNER PREFERENCE PRIMARY LISTENING   LEARNER PREFERENCE CO-LEARNER LISTENING   LEARNING SPECIAL TOPICS no   ANSWERED BY self   RELATIONSHIP SELF     Depression Screening:     3 most recent PHQ Screens 3/26/2019   Little interest or pleasure in doing things Not at all   Feeling down, depressed, irritable, or hopeless Not at all   Total Score PHQ 2 0     Fall Risk Assessment:     Fall Risk Assessment, last 12 mths 3/26/2019   Able to walk? Yes   Fall in past 12 months? No     Abuse Screening:     Abuse Screening Questionnaire 2018   Do you ever feel afraid of your partner? N   Are you in a relationship with someone who physically or mentally threatens you? N   Is it safe for you to go home? Y     Coordination of Care Questionaire:   1. Have you been to the ER, urgent care clinic since your last visit? Hospitalized since your last visit? NO    2. Have you seen or consulted any other health care providers outside of the [address] [place] [address] since your last visit? Include any pap smears or colon screening. YES ophthalmology, PT, ortho, podiatry    Advanced Directive:   1. Do you have an Advanced Directive? YES    2.  Would you like information on Advanced Directives?  NO

## 2019-03-26 NOTE — PATIENT INSTRUCTIONS
Medicare Wellness Visit, Female     The best way to live healthy is to have a lifestyle where you eat a well-balanced diet, exercise regularly, limit alcohol use, and quit all forms of tobacco/nicotine, if applicable. Regular preventive services are another way to keep healthy. Preventive services (vaccines, screening tests, monitoring & exams) can help personalize your care plan, which helps you manage your own care. Screening tests can find health problems at the earliest stages, when they are easiest to treat. [facility] follows the current, evidence-based guidelines published by the [facility] ([facility]) when recommending preventive services for our patients. Because we follow these guidelines, sometimes recommendations change over time as research supports it. (For example, mammograms used to be recommended annually. Even though Medicare will still pay for an annual mammogram, the newer guidelines recommend a mammogram every two years for women of average risk.)  Of course, you and your doctor may decide to screen more often for some diseases, based on your risk and your health status. Preventive services for you include:  - Medicare offers their members a free annual wellness visit, which is time for you and your primary care provider to discuss and plan for your preventive service needs. Take advantage of this benefit every year!  -All adults over the age of 72 should receive the recommended pneumonia vaccines. Current USPSTF guidelines recommend a series of two vaccines for the best pneumonia protection.   -All adults should have a flu vaccine yearly and a tetanus vaccine every 10 years. All adults age 61 and older should receive a shingles vaccine once in their lifetime.    -A bone mass density test is recommended when a woman turns 65 to screen for osteoporosis. This test is only recommended one time, as a screening.  Some providers will use this same test as a disease monitoring tool if you already have osteoporosis. -All adults age 38-68 who are overweight should have a diabetes screening test once every three years.   -Other screening tests and preventive services for persons with diabetes include: an eye exam to screen for diabetic retinopathy, a kidney function test, a foot exam, and stricter control over your cholesterol.   -Cardiovascular screening for adults with routine risk involves an electrocardiogram (ECG) at intervals determined by your doctor.   -Colorectal cancer screenings should be done for adults age 54-65 with no increased risk factors for colorectal cancer. There are a number of acceptable methods of screening for this type of cancer. Each test has its own benefits and drawbacks. Discuss with your doctor what is most appropriate for you during your annual wellness visit. The different tests include: colonoscopy (considered the best screening method), a fecal occult blood test, a fecal DNA test, and sigmoidoscopy. -Breast cancer screenings are recommended every other year for women of normal risk, age 54-69.  -Cervical cancer screenings for women over age 72 are only recommended with certain risk factors.   -All adults born between [facility] should be screened once for Hepatitis C.      Here is a list of your current Health Maintenance items (your personalized list of preventive services) with a due date:  Health Maintenance Due   Topic Date Due    DTaP/Tdap/Td  (1 - Tdap) 06/05/1962    Shingles Vaccine (1 of 2) 06/05/1991    Annual Well Visit  02/22/2019

## 2019-04-08 RX ORDER — OLOPATADINE HYDROCHLORIDE 2 MG/ML
1 SOLUTION/ DROPS OPHTHALMIC DAILY
Qty: 2.5 ML | Refills: 11 | Status: SHIPPED | OUTPATIENT
Start: 2019-04-08 | End: 2019-11-26 | Stop reason: SDUPTHER

## 2019-04-10 ENCOUNTER — TELEPHONE (OUTPATIENT)
Dept: FAMILY MEDICINE CLINIC | Age: 79
End: 2019-04-10

## 2019-04-10 NOTE — TELEPHONE ENCOUNTER
Tell pt that the CT showed chronic age related changes. It did show show inflammation of the L maxillary sinus. I would like her to start using flonase daily. Is she still having the HA?

## 2019-04-11 NOTE — TELEPHONE ENCOUNTER
Patient stated that she would like to have the Flonase to her mail order pharmacy. She stated that she has a headache everyday.

## 2019-04-15 DIAGNOSIS — R51.9 CHRONIC DAILY HEADACHE: ICD-10-CM

## 2019-04-19 ENCOUNTER — TELEPHONE (OUTPATIENT)
Dept: FAMILY MEDICINE CLINIC | Age: 79
End: 2019-04-19

## 2019-04-19 NOTE — TELEPHONE ENCOUNTER
Pt's daughter is returning call for CT scan results. I advised her that I could read the notes to her but that if she has further questions she will have to speak with the nurse. She verbalized understanding. I read the CT notes as written on the scanned document. Pt verbalized understanding and expressed that she does have questions. I then reminded her that the nurse can return her call and answer any questions and relay any recommendations from the doctor next week. She verbalized understanding and I ended the call.

## 2019-04-19 NOTE — TELEPHONE ENCOUNTER
Brayden daughter left msg on nurse vm asking for test results. On phi. Left msg for her to call back.

## 2019-04-23 NOTE — TELEPHONE ENCOUNTER
Pt came into the office 4/22/19, results from CT were gone over. Pt is using neti pot twice daily, is not having much sinus pain now and is not interested in abx for now. She will call us back if sinus symptoms worsen. Gave pt copy of CT results. She says she will talk to her daughter.

## 2019-09-02 NOTE — PATIENT INSTRUCTIONS
Upper Respiratory Infection (Cold): Care Instructions  Your Care Instructions    An upper respiratory infection, or URI, is an infection of the nose, sinuses, or throat. URIs are spread by coughs, sneezes, and direct contact. The common cold is the most frequent kind of URI. The flu and sinus infections are other kinds of URIs. Almost all URIs are caused by viruses. Antibiotics won't cure them. But you can treat most infections with home care. This may include drinking lots of fluids and taking over-the-counter pain medicine. You will probably feel better in 4 to 10 days. The doctor has checked you carefully, but problems can develop later. If you notice any problems or new symptoms, get medical treatment right away. Follow-up care is a key part of your treatment and safety. Be sure to make and go to all appointments, and call your doctor if you are having problems. It's also a good idea to know your test results and keep a list of the medicines you take. How can you care for yourself at home? · To prevent dehydration, drink plenty of fluids, enough so that your urine is light yellow or clear like water. Choose water and other caffeine-free clear liquids until you feel better. If you have kidney, heart, or liver disease and have to limit fluids, talk with your doctor before you increase the amount of fluids you drink. · Take an over-the-counter pain medicine, such as acetaminophen (Tylenol), ibuprofen (Advil, Motrin), or naproxen (Aleve). Read and follow all instructions on the label. · Before you use cough and cold medicines, check the label. These medicines may not be safe for young children or for people with certain health problems. · Be careful when taking over-the-counter cold or flu medicines and Tylenol at the same time. Many of these medicines have acetaminophen, which is Tylenol. Read the labels to make sure that you are not taking more than the recommended dose.  Too much acetaminophen (Tylenol) can be harmful. · Get plenty of rest.  · Do not smoke or allow others to smoke around you. If you need help quitting, talk to your doctor about stop-smoking programs and medicines. These can increase your chances of quitting for good. When should you call for help? Call 911 anytime you think you may need emergency care. For example, call if:  ? · You have severe trouble breathing. ?Call your doctor now or seek immediate medical care if:  ? · You seem to be getting much sicker. ? · You have new or worse trouble breathing. ? · You have a new or higher fever. ? · You have a new rash. ? Watch closely for changes in your health, and be sure to contact your doctor if:  ? · You have a new symptom, such as a sore throat, an earache, or sinus pain. ? · You cough more deeply or more often, especially if you notice more mucus or a change in the color of your mucus. ? · You do not get better as expected. Where can you learn more? Go to http://jalyn-taco.info/. Enter L033 in the search box to learn more about \"Upper Respiratory Infection (Cold): Care Instructions. \"  Current as of: May 12, 2017  Content Version: 11.4  © 3818-4884 Healthwise, Incorporated. Care instructions adapted under license by [employer] (which disclaims liability or warranty for this information). If you have questions about a medical condition or this instruction, always ask your healthcare professional. [place] [address] any warranty or liability for your use of this information. normal (ped)...

## 2019-11-27 ENCOUNTER — TELEPHONE (OUTPATIENT)
Dept: FAMILY MEDICINE CLINIC | Age: 79
End: 2019-11-27

## 2019-11-27 ENCOUNTER — OFFICE VISIT (OUTPATIENT)
Dept: FAMILY MEDICINE CLINIC | Age: 79
End: 2019-11-27

## 2019-11-27 VITALS
OXYGEN SATURATION: 98 % | WEIGHT: 150 LBS | HEART RATE: 80 BPM | BODY MASS INDEX: 24.11 KG/M2 | SYSTOLIC BLOOD PRESSURE: 130 MMHG | HEIGHT: 66 IN | TEMPERATURE: 98 F | RESPIRATION RATE: 18 BRPM | DIASTOLIC BLOOD PRESSURE: 72 MMHG

## 2019-11-27 DIAGNOSIS — E78.5 HYPERLIPIDEMIA, UNSPECIFIED HYPERLIPIDEMIA TYPE: ICD-10-CM

## 2019-11-27 DIAGNOSIS — I10 ESSENTIAL HYPERTENSION: ICD-10-CM

## 2019-11-27 DIAGNOSIS — J30.1 NON-SEASONAL ALLERGIC RHINITIS DUE TO POLLEN: Primary | ICD-10-CM

## 2019-11-27 DIAGNOSIS — E03.9 ACQUIRED HYPOTHYROIDISM: Primary | ICD-10-CM

## 2019-11-27 DIAGNOSIS — J45.21 MILD INTERMITTENT REACTIVE AIRWAY DISEASE WITH WHEEZING WITH ACUTE EXACERBATION: ICD-10-CM

## 2019-11-27 DIAGNOSIS — D69.3 CHRONIC ITP (IDIOPATHIC THROMBOCYTOPENIA) (HCC): Chronic | ICD-10-CM

## 2019-11-27 RX ORDER — OLOPATADINE HYDROCHLORIDE 2 MG/ML
SOLUTION/ DROPS OPHTHALMIC
Qty: 2.5 ML | Refills: 3 | Status: SHIPPED | OUTPATIENT
Start: 2019-11-27 | End: 2022-09-23

## 2019-11-27 NOTE — TELEPHONE ENCOUNTER
Pt is due for her cpe and she is wanting to come in to do labs. Please review her chart and order labs accordingly. Pt is requesting to be called when the orders are in.

## 2019-11-27 NOTE — PROGRESS NOTES
Subjective:      Patient : This patient is a 67 y.o. female that comes in with a chief complaint of allerigic rhinitis and it has worsened. The patient has not been taking Singulair consistently, and changes her allergy med every couple weeks. Patient states long-standing history of allergies, postnasal drip. She states ears sometimes feel wet bilaterally with itching. Objective:     ROS:   Feeling well. No dyspnea or chest pain on exertion. No abdominal pain, change in bowel habits, black or bloody stools. No urinary tract symptoms. No neurological complaints. Positive for postnasal drip, and allergies. OBJECTIVE:   The patient appears well, alert, oriented x 3, in no distress. Visit Vitals  /72   Pulse 80   Temp 98 °F (36.7 °C) (Oral)   Resp 18   Ht 5' 6\" (1.676 m)   Wt 150 lb (68 kg)   LMP  (LMP Unknown)   SpO2 98%   BMI 24.21 kg/m²     HEENT:ENT + pnd, tm's wnl. Neck supple. No adenopathy or thyromegaly. LANI. Chest: Lungs are clear, good air entry, no wheezes, rhonchi or rales. Cardiovascular: S1 and S2 normal, no murmurs, regular rate and rhythm. Assessment/Plan:     Encounter Diagnoses   Name Primary?  Non-seasonal allergic rhinitis due to pollen Yes    Mild intermittent reactive airway disease with wheezing with acute exacerbation      No orders of the defined types were placed in this encounter. .  She has not been taking medication consistently advised patient to take Singulair every single day, and antihistamine every single day to continue same antihistamine instead of switching every couple of weeks. Patient advised that reactive airway along with allergies can cause asthmatic symptoms especially when working indoors around chemicals. Patient will return to care as needed if regiment not working after consistent use.

## 2019-11-27 NOTE — PROGRESS NOTES
Chief Complaint   Patient presents with    Fatigue    Anxiety    Ear Fullness     and itching all the time     Visit Vitals  /72   Pulse 80   Temp 98 °F (36.7 °C) (Oral)   Resp 18   Ht 5' 6\" (1.676 m)   Wt 150 lb (68 kg)   SpO2 98%   BMI 24.21 kg/m²   1. Have you been to the ER, urgent care clinic since your last visit? Hospitalized since your last visit? No    2. Have you seen or consulted any other health care providers outside of the [address] [place] [address] since your last visit? Include any pap smears or colon screening.  No

## 2019-11-27 NOTE — TELEPHONE ENCOUNTER
Left voicemail for the patient letting her know that the lab orders are in, left call back number for the patient if she has any questions.

## 2019-12-04 ENCOUNTER — DOCUMENTATION ONLY (OUTPATIENT)
Dept: FAMILY MEDICINE CLINIC | Age: 79
End: 2019-12-04

## 2019-12-04 ENCOUNTER — HOSPITAL ENCOUNTER (OUTPATIENT)
Dept: LAB | Age: 79
Discharge: HOME | End: 2019-12-04
Payer: MEDICARE

## 2019-12-04 DIAGNOSIS — I10 ESSENTIAL HYPERTENSION: ICD-10-CM

## 2019-12-04 DIAGNOSIS — E78.5 HYPERLIPIDEMIA, UNSPECIFIED HYPERLIPIDEMIA TYPE: ICD-10-CM

## 2019-12-04 DIAGNOSIS — D69.3 CHRONIC ITP (IDIOPATHIC THROMBOCYTOPENIA) (HCC): Chronic | ICD-10-CM

## 2019-12-04 DIAGNOSIS — E03.9 ACQUIRED HYPOTHYROIDISM: ICD-10-CM

## 2019-12-04 LAB
ALBUMIN SERPL-MCNC: 3.9 G/DL (ref 3.4–5)
ALBUMIN/GLOB SERPL: 1.1 {RATIO} (ref 0.8–1.7)
ALP SERPL-CCNC: 111 U/L (ref 45–117)
ALT SERPL-CCNC: 28 U/L (ref 13–56)
ANION GAP SERPL CALC-SCNC: 5 MMOL/L (ref 3–18)
AST SERPL-CCNC: 22 U/L (ref 10–38)
BILIRUB SERPL-MCNC: 0.5 MG/DL (ref 0.2–1)
BUN SERPL-MCNC: 19 MG/DL (ref 7–18)
BUN/CREAT SERPL: 22 (ref 12–20)
CALCIUM SERPL-MCNC: 9.7 MG/DL (ref 8.5–10.1)
CHLORIDE SERPL-SCNC: 106 MMOL/L (ref 100–111)
CHOLEST SERPL-MCNC: 159 MG/DL
CO2 SERPL-SCNC: 29 MMOL/L (ref 21–32)
CREAT SERPL-MCNC: 0.85 MG/DL (ref 0.6–1.3)
ERYTHROCYTE [DISTWIDTH] IN BLOOD BY AUTOMATED COUNT: 13.7 % (ref 11.6–14.5)
GLOBULIN SER CALC-MCNC: 3.6 G/DL (ref 2–4)
GLUCOSE SERPL-MCNC: 95 MG/DL (ref 74–99)
HCT VFR BLD AUTO: 42.8 % (ref 35–45)
HDLC SERPL-MCNC: 68 MG/DL (ref 40–60)
HDLC SERPL: 2.3 {RATIO} (ref 0–5)
HGB BLD-MCNC: 13.7 G/DL (ref 12–16)
LDLC SERPL CALC-MCNC: 75.8 MG/DL (ref 0–100)
LIPID PROFILE,FLP: ABNORMAL
MCH RBC QN AUTO: 29.1 PG (ref 24–34)
MCHC RBC AUTO-ENTMCNC: 32 G/DL (ref 31–37)
MCV RBC AUTO: 91.1 FL (ref 74–97)
PLATELET # BLD AUTO: 108 K/UL (ref 135–420)
PMV BLD AUTO: 12.2 FL (ref 9.2–11.8)
POTASSIUM SERPL-SCNC: 4.1 MMOL/L (ref 3.5–5.5)
PROT SERPL-MCNC: 7.5 G/DL (ref 6.4–8.2)
RBC # BLD AUTO: 4.7 M/UL (ref 4.2–5.3)
SODIUM SERPL-SCNC: 140 MMOL/L (ref 136–145)
TRIGL SERPL-MCNC: 76 MG/DL (ref ?–150)
TSH SERPL DL<=0.05 MIU/L-ACNC: 2.22 UIU/ML (ref 0.36–3.74)
VLDLC SERPL CALC-MCNC: 15.2 MG/DL
WBC # BLD AUTO: 5.6 K/UL (ref 4.6–13.2)

## 2019-12-04 PROCEDURE — 36415 COLL VENOUS BLD VENIPUNCTURE: CPT

## 2019-12-04 PROCEDURE — 80061 LIPID PANEL: CPT

## 2019-12-04 PROCEDURE — 84443 ASSAY THYROID STIM HORMONE: CPT

## 2019-12-04 PROCEDURE — 85027 COMPLETE CBC AUTOMATED: CPT

## 2019-12-04 PROCEDURE — 80053 COMPREHEN METABOLIC PANEL: CPT

## 2019-12-04 NOTE — PROGRESS NOTES
Patient was very nasty and rude this morning. Due to her not being able to schedule a appt one week after getting her lab work done today. Pt was giving the first available appt on Monday 23 @1:45pm. Pt continued to say she wanted to switch provider and start seeing Dr. Esvin Martínez only because the feeling that her current provider is never in the office or has available appts when she calls. Pt was than explain that both provider would have to agree to the switch.

## 2019-12-17 ENCOUNTER — HOSPITAL ENCOUNTER (OUTPATIENT)
Dept: LAB | Age: 79
Discharge: HOME | End: 2019-12-17
Payer: MEDICARE

## 2019-12-17 ENCOUNTER — TELEPHONE (OUTPATIENT)
Dept: FAMILY MEDICINE CLINIC | Age: 79
End: 2019-12-17

## 2019-12-17 ENCOUNTER — OFFICE VISIT (OUTPATIENT)
Dept: ONCOLOGY | Age: 79
End: 2019-12-17

## 2019-12-17 VITALS
RESPIRATION RATE: 18 BRPM | DIASTOLIC BLOOD PRESSURE: 80 MMHG | WEIGHT: 152 LBS | HEART RATE: 81 BPM | SYSTOLIC BLOOD PRESSURE: 121 MMHG | BODY MASS INDEX: 24.53 KG/M2 | OXYGEN SATURATION: 96 %

## 2019-12-17 DIAGNOSIS — D72.10 EOSINOPHILIA: ICD-10-CM

## 2019-12-17 DIAGNOSIS — D69.3 CHRONIC ITP (IDIOPATHIC THROMBOCYTOPENIA) (HCC): Chronic | ICD-10-CM

## 2019-12-17 DIAGNOSIS — D69.3 CHRONIC ITP (IDIOPATHIC THROMBOCYTOPENIA) (HCC): Primary | Chronic | ICD-10-CM

## 2019-12-17 DIAGNOSIS — D47.2 MGUS (MONOCLONAL GAMMOPATHY OF UNKNOWN SIGNIFICANCE): ICD-10-CM

## 2019-12-17 LAB
ALBUMIN SERPL-MCNC: 4.3 G/DL (ref 3.4–5)
ALBUMIN/GLOB SERPL: 1.3 {RATIO} (ref 0.8–1.7)
ALP SERPL-CCNC: 115 U/L (ref 45–117)
ALT SERPL-CCNC: 31 U/L (ref 13–56)
ANION GAP SERPL CALC-SCNC: 5 MMOL/L (ref 3–18)
AST SERPL-CCNC: 25 U/L (ref 10–38)
BASOPHILS # BLD: 0 K/UL (ref 0–0.1)
BASOPHILS NFR BLD: 1 % (ref 0–2)
BILIRUB SERPL-MCNC: 0.4 MG/DL (ref 0.2–1)
BUN SERPL-MCNC: 26 MG/DL (ref 7–18)
BUN/CREAT SERPL: 33 (ref 12–20)
CALCIUM SERPL-MCNC: 10.1 MG/DL (ref 8.5–10.1)
CHLORIDE SERPL-SCNC: 105 MMOL/L (ref 100–111)
CO2 SERPL-SCNC: 30 MMOL/L (ref 21–32)
CREAT SERPL-MCNC: 0.79 MG/DL (ref 0.6–1.3)
DIFFERENTIAL METHOD BLD: ABNORMAL
EOSINOPHIL # BLD: 0.1 K/UL (ref 0–0.4)
EOSINOPHIL NFR BLD: 2 % (ref 0–5)
ERYTHROCYTE [DISTWIDTH] IN BLOOD BY AUTOMATED COUNT: 13.4 % (ref 11.6–14.5)
GLOBULIN SER CALC-MCNC: 3.4 G/DL (ref 2–4)
GLUCOSE SERPL-MCNC: 84 MG/DL (ref 74–99)
HCT VFR BLD AUTO: 41.9 % (ref 35–45)
HGB BLD-MCNC: 13.4 G/DL (ref 12–16)
LYMPHOCYTES # BLD: 1.7 K/UL (ref 0.9–3.6)
LYMPHOCYTES NFR BLD: 25 % (ref 21–52)
MCH RBC QN AUTO: 29.3 PG (ref 24–34)
MCHC RBC AUTO-ENTMCNC: 32 G/DL (ref 31–37)
MCV RBC AUTO: 91.7 FL (ref 74–97)
MONOCYTES # BLD: 0.6 K/UL (ref 0.05–1.2)
MONOCYTES NFR BLD: 9 % (ref 3–10)
NEUTS SEG # BLD: 4.4 K/UL (ref 1.8–8)
NEUTS SEG NFR BLD: 63 % (ref 40–73)
PLATELET # BLD AUTO: 123 K/UL (ref 135–420)
PMV BLD AUTO: 11.9 FL (ref 9.2–11.8)
POTASSIUM SERPL-SCNC: 3.9 MMOL/L (ref 3.5–5.5)
PROT SERPL-MCNC: 7.7 G/DL (ref 6.4–8.2)
RBC # BLD AUTO: 4.57 M/UL (ref 4.2–5.3)
SODIUM SERPL-SCNC: 140 MMOL/L (ref 136–145)
WBC # BLD AUTO: 6.9 K/UL (ref 4.6–13.2)

## 2019-12-17 PROCEDURE — 85025 COMPLETE CBC W/AUTO DIFF WBC: CPT

## 2019-12-17 PROCEDURE — 86677 HELICOBACTER PYLORI ANTIBODY: CPT

## 2019-12-17 PROCEDURE — 86682 HELMINTH ANTIBODY: CPT

## 2019-12-17 PROCEDURE — 36415 COLL VENOUS BLD VENIPUNCTURE: CPT

## 2019-12-17 PROCEDURE — 82784 ASSAY IGA/IGD/IGG/IGM EACH: CPT

## 2019-12-17 PROCEDURE — 80053 COMPREHEN METABOLIC PANEL: CPT

## 2019-12-17 RX ORDER — BUDESONIDE 1 MG/2ML
INHALANT ORAL
COMMUNITY
Start: 2019-11-27 | End: 2022-09-23

## 2019-12-17 RX ORDER — DICLOFENAC SODIUM 20 MG/G
2 SOLUTION TOPICAL
COMMUNITY
Start: 2017-09-15

## 2019-12-17 RX ORDER — BENZONATATE 200 MG/1
CAPSULE ORAL
COMMUNITY
Start: 2019-11-27 | End: 2022-09-23

## 2019-12-17 RX ORDER — LEVOCETIRIZINE DIHYDROCHLORIDE 5 MG/1
TABLET, FILM COATED ORAL
COMMUNITY
Start: 2019-11-06 | End: 2022-02-04

## 2019-12-17 RX ORDER — LEVOCETIRIZINE DIHYDROCHLORIDE 5 MG/1
TABLET, COATED ORAL
COMMUNITY
Start: 2019-11-06 | End: 2022-09-23

## 2019-12-17 RX ORDER — OMEGA-3 FATTY ACIDS 1000 MG
4000 CAPSULE ORAL
COMMUNITY
End: 2022-09-23

## 2019-12-17 NOTE — TELEPHONE ENCOUNTER
Patient called stating that she received a letter stating that she's being discharged from the practice and didn't know whether she should keep her appointment on 12/20/19 regarding her lab results.  Please advise

## 2019-12-17 NOTE — PROGRESS NOTES
Jaleel Wiley is a 67 y.o. female presenting today for a new patient appointment. Patient is ambulatory with no assistive devices, is accompanied by her daughter, and reports easy bruising. Chief Complaint   Patient presents with    Thrombocytopenia     Consult for second opinion       Visit Vitals  /80   Pulse 81   Resp 18   Wt 152 lb (68.9 kg)   LMP  (LMP Unknown)   SpO2 96%   BMI 24.53 kg/m²       Current Outpatient Medications   Medication Sig    levocetirizine (XYZAL) 5 mg tablet     benzonatate (TESSALON) 200 mg capsule     quinapril (ACCUPRIL) 40 mg tablet Take 1 Tab by mouth daily.  levothyroxine (SYNTHROID) 88 mcg tablet Take 1 Tab by mouth Daily (before breakfast).  lovastatin (MEVACOR) 20 mg tablet Take 1 Tab by mouth daily.  montelukast (SINGULAIR) 10 mg tablet Take 1 Tab by mouth daily.  omeprazole (PRILOSEC) 40 mg capsule Take 1 Cap by mouth daily as needed (reflux).  hydrALAZINE (APRESOLINE) 25 mg tablet Take 1 Tab by mouth two (2) times a day.  potassium bicarb-magnesium 21 500-300 mg/6.1 gram pwef Take 1 Dose by mouth.  omega-3 fatty acids 1,000 mg cap Take 4,000 mg by mouth.  diclofenac sodium 20 mg/gram /actuation(2 %) sopm Apply 2 Squirts to affected area.  budesonide (PULMICORT) 1 mg/2 mL nbsp     b complex-vitamin c-folic acid (NEPHROCAPS) 1 mg capsule Take 1 Cap by mouth.  MULTIVITAMIN PO Take 1 Tab by mouth.  ALLERGY RELIEF, LEVOCETIRIZIN, 5 mg tablet     fexofenadine HCl (ALLEGRA PO) Take 1 Tab by mouth.  ascorbic acid/collagen hydr (COLLAGEN PLUS VITAMIN C PO) by Misc. (Non-Drug; Combo Route) route Once a Day.  olopatadine (PATADAY) 0.2 % drop ophthalmic solution INSTILL 1 DROP INTO BOTH EYES EVERY DAY    triamterene-hydroCHLOROthiazide (DYAZIDE) 37.5-25 mg per capsule Take 1 Cap by mouth daily.  fluticasone propionate (FLONASE) 50 mcg/actuation nasal spray 2 Sprays by Both Nostrils route daily.  Indications: Chronic Inflammation of the Nose Not due to Allergies    fexofenadine (ALLEGRA) 180 mg tablet Take 1 Tab by mouth daily as needed for Allergies.  therapeutic multivitamin (THERAGRAN) tablet Take 1 Tab by mouth daily.  calcium-cholecalciferol, d3, (CALCIUM 600 + D) 600-125 mg-unit tab Take 1 Tab by mouth two (2) times a day.  ascorbic acid, vitamin C, (VITAMIN C) 500 mg tablet Take 500 mg by mouth daily.  DOCOSAHEXANOIC ACID/EPA (FISH OIL PO) Take 1 Cap by mouth daily.  MSM-Zaplox-co Q10-herb no. [id number] mg tab Take 1 Tab by mouth daily.  b complex vitamins tablet Take 1 Tab by mouth daily.  acetaminophen (TYLENOL) 325 mg tablet Take 325 mg by mouth every four (4) hours as needed for Pain. No current facility-administered medications for this visit. Medications no longer taking/discontinued:     Fall Risk Assessment, last 12 mths 11/27/2019   Able to walk? Yes   Fall in past 12 months? No       3 most recent PHQ Screens 11/27/2019   Little interest or pleasure in doing things Not at all   Feeling down, depressed, irritable, or hopeless Not at all   Total Score PHQ 2 0       Abuse Screening Questionnaire 3/26/2019   Do you ever feel afraid of your partner? N   Are you in a relationship with someone who physically or mentally threatens you? N   Is it safe for you to go home?  Y       Learning Assessment 1/23/2018   PRIMARY LEARNER Patient   HIGHEST LEVEL OF EDUCATION - PRIMARY LEARNER  DID NOT GRADUATE HIGH SCHOOL   BARRIERS PRIMARY LEARNER LANGUAGE   CO-LEARNER CAREGIVER Yes   CO-LEARNER NAME daughter   Yamila Stamp LEVEL OF EDUCATION 4 YEARS OF COLLEGE   BARRIERS CO-LEARNER NONE   PRIMARY LANGUAGE Khmer   PRIMARY LANGUAGE CO-LEARNER ENGLISH    NEED No   LEARNER PREFERENCE PRIMARY LISTENING   LEARNER PREFERENCE CO-LEARNER LISTENING   LEARNING SPECIAL TOPICS no   ANSWERED BY self   RELATIONSHIP SELF

## 2019-12-17 NOTE — PROGRESS NOTES
[facility]  [id number] [place] [address], [address]  [place], [place] [facility] [address]  Office Phone: [phone]  Fax: [phone]      Reason for visit:  Thrombocytopenia (Consult for second opinion)      HPI:   Kecia Bryson is a 67 y.o.  female who I was asked to see in consultation at the request of self  for evaluation for second opinion for thrombocytopenia. Patient was being seen by Dr. Payal Workman at [facility]    From review of the notes provided it seems that her platelet count mostly ranges from 49914 to [id number]. Immune electrophoresis 09/21/2016 showed an IgA lambda monoclonal gammopathy. Bone marrow biopsy and aspirate 10/26/2016 showed a variably cellular marrow with trilineage hematopoiesis. There was traced stainable iron. There was no evidence of suspicious T-cell, B-cell, plasma cell or abnormal myeloid population. FISH study showed 5q- in 5% of cells and monosomy 5 in 4% of cells. Chromosome analysis showed 55, XX. On 11/23/2016 she received 4 consecutive days of oral dexamethasone. Platelet count increased to 174,000 then return to the 60,000 range. From 7/17/2017-8/31/2017, she received Rituxan x4. Labs on 12/11/2019 showed WBC 7.2, H&H 13.5/41.3, platelet 81,129. Absolute eosinophil count was 0.3 (0-0 0.2). K=16.3 was 12 in July 2019  L =101 was 70.5 in July 2019  K/L=0.16 was 0.18 in July 2019  SPEP M spike of 0.7. Total protein 7.1, albumin 4.1, IgA was elevated at 1197 was 810 in July 2019  Presents with left ankle strengthening doing UPEP showed 0.7 g/dL M spike. TSH was normal.  Platelet associated antibody to IIb/IIa, anti-Ib/X, anti-Ia/IIa were all negative. Glycoprotein for antibody was also negative. On review of systems today her only complaint is easy bruising. She otherwise denies any bright red blood per rectum, epistaxis, hematemesis, or melena. No focal neurologic deficit. All other point of review of system have been reviewed and were negative. ECOG performance status 0. Independent with ADLs and IADLs    DX: Thrombocytopenia    Past Medical History:   Diagnosis Date    Acid indigestion     Essential hypertension, benign     History of EMG 2017    Normal study without electrical evidence of a focal / peripheral neuropathy of C5-T1 radiculopathy affecting the R arm.  Sinus problem     Unspecified hypothyroidism      Past Surgical History:   Procedure Laterality Date    HX BACK SURGERY      disc    HX PARTIAL HYSTERECTOMY      HX SHOULDER ARTHROSCOPY Right 03/10/2017    subA decompression, distal clavicle excision, partial thickness cuff tear debridement     Social History     Socioeconomic History    Marital status:      Spouse name: Not on file    Number of children: Not on file    Years of education: Not on file    Highest education level: Not on file   Tobacco Use    Smoking status: Former Smoker     Packs/day: 1.00     Years: 40.00     Pack years: 40.00     Types: Cigarettes     Last attempt to quit: 1980     Years since quittin.9    Smokeless tobacco: Never Used   Substance and Sexual Activity    Alcohol use: Yes     Alcohol/week: 0.8 standard drinks     Types: 1 Glasses of wine per week     Comment: socially once weekly    Drug use: No    Sexual activity: Not Currently     Family History   Problem Relation Age of Onset    Stroke Mother     Hypertension Mother     Lung Cancer Father     Colon Cancer Sister     Lung Cancer Brother        Current Outpatient Medications   Medication Sig Dispense Refill    levocetirizine (XYZAL) 5 mg tablet       benzonatate (TESSALON) 200 mg capsule       quinapril (ACCUPRIL) 40 mg tablet Take 1 Tab by mouth daily. 90 Tab 3    levothyroxine (SYNTHROID) 88 mcg tablet Take 1 Tab by mouth Daily (before breakfast). 90 Tab 3    lovastatin (MEVACOR) 20 mg tablet Take 1 Tab by mouth daily. 90 Tab 3    montelukast (SINGULAIR) 10 mg tablet Take 1 Tab by mouth daily.  90 Tab 3    omeprazole (PRILOSEC) 40 mg capsule Take 1 Cap by mouth daily as needed (reflux). 90 Cap 3    hydrALAZINE (APRESOLINE) 25 mg tablet Take 1 Tab by mouth two (2) times a day. 180 Tab 3    potassium bicarb-magnesium 21 500-300 mg/6.1 gram pwef Take 1 Dose by mouth.  omega-3 fatty acids 1,000 mg cap Take 4,000 mg by mouth.  diclofenac sodium 20 mg/gram /actuation(2 %) sopm Apply 2 Squirts to affected area.  budesonide (PULMICORT) 1 mg/2 mL nbsp       b complex-vitamin c-folic acid (NEPHROCAPS) 1 mg capsule Take 1 Cap by mouth.  MULTIVITAMIN PO Take 1 Tab by mouth.  ALLERGY RELIEF, LEVOCETIRIZIN, 5 mg tablet       fexofenadine HCl (ALLEGRA PO) Take 1 Tab by mouth.  ascorbic acid/collagen hydr (COLLAGEN PLUS VITAMIN C PO) by Misc. (Non-Drug; Combo Route) route Once a Day.  olopatadine (PATADAY) 0.2 % drop ophthalmic solution INSTILL 1 DROP INTO BOTH EYES EVERY DAY 2.5 mL 3    triamterene-hydroCHLOROthiazide (DYAZIDE) 37.5-25 mg per capsule Take 1 Cap by mouth daily. 90 Cap 3    fluticasone propionate (FLONASE) 50 mcg/actuation nasal spray 2 Sprays by Both Nostrils route daily. Indications: Chronic Inflammation of the Nose Not due to Allergies 3 Bottle 3    fexofenadine (ALLEGRA) 180 mg tablet Take 1 Tab by mouth daily as needed for Allergies. 90 Tab 3    therapeutic multivitamin (THERAGRAN) tablet Take 1 Tab by mouth daily.  calcium-cholecalciferol, d3, (CALCIUM 600 + D) 600-125 mg-unit tab Take 1 Tab by mouth two (2) times a day.  ascorbic acid, vitamin C, (VITAMIN C) 500 mg tablet Take 500 mg by mouth daily.  DOCOSAHEXANOIC ACID/EPA (FISH OIL PO) Take 1 Cap by mouth daily.  MSM-collag-co Q10-herb no. [phone] mg tab Take 1 Tab by mouth daily.  b complex vitamins tablet Take 1 Tab by mouth daily.  acetaminophen (TYLENOL) 325 mg tablet Take 325 mg by mouth every four (4) hours as needed for Pain.          Allergies   Allergen Reactions    Amlodipine Swelling     Edema of feet/ankles    Amlodipine-Benazepril Swelling    Augmentin [Amoxicillin-Pot Clavulanate] Other (comments)     allopecia    Codeine Unknown (comments)    Levofloxacin Other (comments)     HAIR LOSS    Nitrofurantoin Macrocrystalline Other (comments)     Flu like symptoms    Penicillins Other (comments)     \"It makes me cold and hot\"    Sweat, itch    Sulfa (Sulfonamide Antibiotics) Nausea Only    Sulfamethoxazole-Trimethoprim Other (comments)    Verapamil Other (comments)     Flu like symptoms   fatigue       Review of Systems  As per HPI  Objective:  Physical Exam:  Visit Vitals  /80   Pulse 81   Resp 18   Wt 68.9 kg (152 lb)   LMP  (LMP Unknown)   SpO2 96%   BMI 24.53 kg/m²       General:  Alert, cooperative, no distress, appears stated age. Head:  Normocephalic, without obvious abnormality, atraumatic. Eyes:  Conjunctivae/corneas clear. PERRL, EOMs intact. Throat: Lips, mucosa, and tongue normal.    Neck: Supple, symmetrical, trachea midline, no adenopathy, thyroid: no enlargement/tenderness/nodules   Back:   Symmetric, no curvature. ROM normal. No CVA tenderness. Lungs:   Clear to auscultation bilaterally. Chest wall:  No tenderness or deformity. Heart:  Regular rate and rhythm, S1, S2 normal, no murmur, click, rub or gallop. Abdomen:   Soft, non-tender. Bowel sounds normal. No masses,  No organomegaly. Extremities: Extremities normal, atraumatic, no cyanosis or edema. Skin: Skin color, texture, turgor normal. No rashes or lesions. Lymph nodes: Cervical, supraclavicular, and axillary nodes normal.   Neurologic: CNII-XII intact.        Diagnostic Imaging     Results for orders placed in visit on 04/15/19   CT HEAD WO CONT       Lab Results  Lab Results   Component Value Date/Time    WBC 5.6 12/04/2019 08:42 AM    HGB 13.7 12/04/2019 08:42 AM    HCT 42.8 12/04/2019 08:42 AM    PLATELET 673 (L) 43/73/6137 08:42 AM    MCV 91.1 12/04/2019 08:42 AM Lab Results   Component Value Date/Time    Sodium 140 12/04/2019 08:42 AM    Potassium 4.1 12/04/2019 08:42 AM    Chloride 106 12/04/2019 08:42 AM    CO2 29 12/04/2019 08:42 AM    Anion gap 5 12/04/2019 08:42 AM    Glucose 95 12/04/2019 08:42 AM    BUN 19 (H) 12/04/2019 08:42 AM    Creatinine 0.85 12/04/2019 08:42 AM    BUN/Creatinine ratio 22 (H) 12/04/2019 08:42 AM    GFR est AA >60 12/04/2019 08:42 AM    GFR est non-AA >60 12/04/2019 08:42 AM    Calcium 9.7 12/04/2019 08:42 AM    AST (SGOT) 22 12/04/2019 08:42 AM    Alk. phosphatase 111 12/04/2019 08:42 AM    Protein, total 7.5 12/04/2019 08:42 AM    Albumin 3.9 12/04/2019 08:42 AM    Globulin 3.6 12/04/2019 08:42 AM    A-G Ratio 1.1 12/04/2019 08:42 AM    ALT (SGPT) 28 12/04/2019 08:42 AM     No up with PCP for health maintenance  Assessment/Plan:  67 y.o. female with   1. Chronic ITP (idiopathic thrombocytopenia) (HCC)  Patient has ITP which responded well to steroids and Rituxan in the past. She has no history of bleeding and knows to stay away from ASA and NSAIDs. She has been appropriately managed by Bisi Wallace and his team at [facility]. 15. I told her that the goal of ITP treatment is to stop or prevent bleeding and she is currently at low risk for bleeding with platelets running between 60,000-120,000. I will check for labs as below otherwise no hematologic intervention needed for the moment except for surveillance every 6 weeks to 3 months with CBCs. - CBC WITH AUTOMATED DIFF; Future  - METABOLIC PANEL, COMPREHENSIVE; Future  - H PYLORI ABS, IGA AND IGG; Future    2. Eosinophilia  She has mild acute renal failure which is possibly secondary to her allergies she says. However since she had international traveling I will check stool for ova and parasite as well as strongyloidiasis and H. pylori.  - CBC WITH AUTOMATED DIFF; Future  - METABOLIC PANEL, COMPREHENSIVE;  Future  - STRONGYLOIDES AB, IGG; Future  - OVA & PARASITES, STOOL; Future  - H PYLORI ABS, IGA AND IGG; Future    3. MGUS (monoclonal gammopathy of unknown significance)  Patient has IgG MGUS which has been stable. There was a slight increase on the light chain recently though. H&H, calcium, albumin, total protein, creatinine, have been normal.  Light chain ratio is less than 100. Bone marrow plasma cells in 2016 was normal.  If light chains and IgA keep increasing then I would recommend repeating bone marrow biopsy due to the biologic nature of her IgA myeloma. - GAMMOPATHY EVAL, SPEP/OFE, IG QT/FLC; Future  - CBC WITH AUTOMATED DIFF; Future  - METABOLIC PANEL, COMPREHENSIVE;  Future    Return in 2 weeks then patient to follow with her primary hematologist Dr. Juan C Cueva

## 2019-12-18 LAB
H PYLORI IGA SER-ACNC: 14.7 UNITS (ref 0–8.9)
H PYLORI IGG SER IA-ACNC: >9.4 INDEX VALUE (ref 0–0.79)
STRONGYLOIDES IGG SER IA-ACNC: NEGATIVE

## 2019-12-19 ENCOUNTER — TELEPHONE (OUTPATIENT)
Dept: ONCOLOGY | Age: 79
End: 2019-12-19

## 2019-12-19 DIAGNOSIS — B96.81 H PYLORI ULCER: Primary | ICD-10-CM

## 2019-12-19 DIAGNOSIS — K27.9 H PYLORI ULCER: ICD-10-CM

## 2019-12-19 DIAGNOSIS — K27.9 H PYLORI ULCER: Primary | ICD-10-CM

## 2019-12-19 DIAGNOSIS — B96.81 H PYLORI ULCER: ICD-10-CM

## 2019-12-19 RX ORDER — PANTOPRAZOLE SODIUM 40 MG/1
40 TABLET, DELAYED RELEASE ORAL 2 TIMES DAILY
Qty: 28 TAB | Refills: 0 | Status: SHIPPED | OUTPATIENT
Start: 2019-12-19 | End: 2019-12-19

## 2019-12-19 RX ORDER — PANTOPRAZOLE SODIUM 40 MG/1
40 TABLET, DELAYED RELEASE ORAL 2 TIMES DAILY
Qty: 28 TAB | Refills: 0 | Status: SHIPPED | OUTPATIENT
Start: 2019-12-19 | End: 2020-01-02

## 2019-12-19 RX ORDER — CLARITHROMYCIN 500 MG/1
500 TABLET, FILM COATED ORAL 2 TIMES DAILY
Qty: 28 TAB | Refills: 0 | Status: SHIPPED | OUTPATIENT
Start: 2019-12-19 | End: 2019-12-20 | Stop reason: ALTCHOICE

## 2019-12-19 RX ORDER — PANTOPRAZOLE SODIUM 40 MG/1
TABLET, DELAYED RELEASE ORAL
Qty: 28 TAB | Refills: 0 | Status: SHIPPED | OUTPATIENT
Start: 2019-12-19 | End: 2019-12-20 | Stop reason: ALTCHOICE

## 2019-12-19 RX ORDER — CLARITHROMYCIN 500 MG/1
500 TABLET, FILM COATED ORAL 2 TIMES DAILY
Qty: 28 TAB | Refills: 0 | Status: SHIPPED | OUTPATIENT
Start: 2019-12-19 | End: 2019-12-20 | Stop reason: SDUPTHER

## 2019-12-19 RX ORDER — METRONIDAZOLE 500 MG/1
500 TABLET ORAL 3 TIMES DAILY
Qty: 42 TAB | Refills: 0 | Status: SHIPPED | OUTPATIENT
Start: 2019-12-19 | End: 2019-12-20 | Stop reason: SDUPTHER

## 2019-12-19 RX ORDER — METRONIDAZOLE 500 MG/1
500 TABLET ORAL 3 TIMES DAILY
Qty: 42 TAB | Refills: 0 | Status: SHIPPED | OUTPATIENT
Start: 2019-12-19 | End: 2019-12-20 | Stop reason: ALTCHOICE

## 2019-12-19 NOTE — TELEPHONE ENCOUNTER
rec'd call from pharmacist regarding prescription for clarithromycin (BIAXIN) 500 mg tablet, stated the patient is also taking Lovastatin, which should not be taken together.   Insurance will not cover and is requiring a Prior Auth or a different medication in place of clarithromycin

## 2019-12-20 ENCOUNTER — OFFICE VISIT (OUTPATIENT)
Dept: FAMILY MEDICINE CLINIC | Age: 79
End: 2019-12-20

## 2019-12-20 ENCOUNTER — TELEPHONE (OUTPATIENT)
Dept: ONCOLOGY | Age: 79
End: 2019-12-20

## 2019-12-20 VITALS
HEART RATE: 91 BPM | WEIGHT: 149 LBS | BODY MASS INDEX: 23.95 KG/M2 | DIASTOLIC BLOOD PRESSURE: 64 MMHG | RESPIRATION RATE: 18 BRPM | SYSTOLIC BLOOD PRESSURE: 101 MMHG | TEMPERATURE: 97.9 F | HEIGHT: 66 IN | OXYGEN SATURATION: 98 %

## 2019-12-20 DIAGNOSIS — R76.0 POSITIVE H. PYLORI TITER: Primary | ICD-10-CM

## 2019-12-20 DIAGNOSIS — I10 ESSENTIAL HYPERTENSION: ICD-10-CM

## 2019-12-20 NOTE — PROGRESS NOTES
Nabila Monae is a 67 y.o. female (: 1940) presenting to address:    Chief Complaint   Patient presents with    Labs     follow up on results       Vitals:    19 1112   BP: 101/64   Pulse: 91   Resp: 18   Temp: 97.9 °F (36.6 °C)   TempSrc: Oral   SpO2: 98%   Weight: 149 lb (67.6 kg)   Height: 5' 6\" (1.676 m)   PainSc:   0 - No pain       Hearing/Vision:   No exam data present    Learning Assessment:     Learning Assessment 2018   PRIMARY LEARNER Patient   HIGHEST LEVEL OF EDUCATION - PRIMARY LEARNER  DID NOT GRADUATE HIGH SCHOOL   BARRIERS PRIMARY LEARNER LANGUAGE   CO-LEARNER CAREGIVER Yes   CO-LEARNER NAME daughter   Tiarra Wallace LEVEL OF EDUCATION 4 YEARS [facility]   PRIMARY LANGUAGE Montenegrin   PRIMARY LANGUAGE CO-LEARNER ENGLISH    NEED No   LEARNER PREFERENCE PRIMARY LISTENING   LEARNER PREFERENCE CO-LEARNER LISTENING   LEARNING SPECIAL TOPICS no   ANSWERED BY self   RELATIONSHIP SELF     Depression Screening:     3 most recent PHQ Screens 2019   Little interest or pleasure in doing things Not at all   Feeling down, depressed, irritable, or hopeless Not at all   Total Score PHQ 2 0     Fall Risk Assessment:     Fall Risk Assessment, last 12 mths 2019   Able to walk? Yes   Fall in past 12 months? No     Abuse Screening:     Abuse Screening Questionnaire 3/26/2019   Do you ever feel afraid of your partner? N   Are you in a relationship with someone who physically or mentally threatens you? N   Is it safe for you to go home? Y     Coordination of Care Questionaire:   1. Have you been to the ER, urgent care clinic since your last visit? Hospitalized since your last visit? NO    2. Have you seen or consulted any other health care providers outside of the [address] [place] [address] since your last visit? Include any pap smears or colon screening. NO    Advanced Directive:   1. Do you have an Advanced Directive? YES    2.  Would you like information on Advanced Directives?  NO

## 2019-12-20 NOTE — TELEPHONE ENCOUNTER
Prior authorization submitted to insurance for Clarithromycin via CoverMyMeds (Luis Angel SALCEDO Case ID: [id number])

## 2019-12-20 NOTE — TELEPHONE ENCOUNTER
Prior authorization approved for Clarithromycin (Approvedtoday   PA Case: [id number], Status: Approved, Coverage Starts on: 12/20/2019 12:00:00 AM, Coverage Ends on: 12/31/2020 12:00:00 AM. Questions? Contact [phone]. ). Per Dr. Nora Barlow patient can take Clarithromycin but must stop Lovastatin while taking it. He has already advised the patient of this.      Informed [facility] pharmacy of PA approval.

## 2019-12-20 NOTE — PROGRESS NOTES
Assessment/Plan:    1. Positive H. pylori titer  -per Dr. Danika Jovel    2. Essential hypertension  -cont current. I discussed that my availability is not likely to change, unfortunately. If she is unhappy seeing other providers for sick visits, then she needs to find a provider that has the availability to meet her needs. A total of 15 minutes was spent with the patient of which 15 minutes was spent in counseling regarding h pylori treatment interaction with statin. She was advised to hold statin while taking meds for h pylori prescribed by Nalini. The plan was discussed with the patient. The patient verbalized understanding and is in agreement with the plan. All medication potential side effects were discussed with the patient. Health Maintenance:   Health Maintenance   Topic Date Due    Shingrix Vaccine Age 49> (1 of 2) 06/05/1991    Influenza Age 5 to Adult  08/01/2019    DTaP/Tdap/Td series (1 - Tdap) 03/26/2020 (Originally 6/5/1951)    MEDICARE YEARLY EXAM  03/26/2020    GLAUCOMA SCREENING Q2Y  03/14/2021    Pneumococcal 65+ years  Completed    Bone Densitometry (Dexa) Screening  Addressed       Valeria Goldstein is a 67 y.o. female and presents with Labs (follow up on results)     Subjective:  Pt is here to go over her labs. Labs are all stable. She has chronic thrombocytopenia, managed by hematology. The patient is upset b/c she has been discharged from my care due to patient request to change doctors again (she changed from Dr. Lincoln Gomez to me last year for availability reasons). She also stated the same thing to change doctors to Dr. Jovany Feldman. She states she is unhappy that she had to see other doctors/NP when she had a sinus infection. She also has seen Dr. Danika Jovel, a different heme/onc doctor - who apparently told her a different treatment plan than ANNETTE has been telling her.   Also told her she has H pylori, based on IgA and IgG testing and has prescribed treatment for this issue.    ROS:  Constitutional: No recent weight change. No weakness/fatigue. No f/c. Cardiovascular: No CP/palpitations. No GALDAMEZ/orthopnea/PND. Respiratory: No cough/sputum, dyspnea, wheezing. Gastointestinal: No dysphagia, reflux. No n/v. No constipation/diarrhea. No melena/rectal bleeding. Genitourinary: No dysuria, urinary hesitancy, nocturia, hematuria. No incontinence. The problem list was updated as a part of today's visit. Patient Active Problem List   Diagnosis Code    Essential hypertension I10    Hyperlipidemia E78.5    Acquired hypothyroidism E03.9    Impingement syndrome of right shoulder M75.41    Primary insomnia F51.01    Chronic [facility]P [facility]idiopathic thrombocytopenia) ([facility]) D69.3    GERD (gastroesophageal reflux disease) K21.9    Sensorineural hearing loss, bilateral H90.3    Arthritis of right shoulder region M19.011    Nepali speaking patient [id number]    DDD (degenerative disc disease), cervical M50.30    Headache, chronic daily R51    Osteopenia of multiple sites M85.89    Non-seasonal allergic rhinitis due to pollen J30.1    Eosinophilia D72.1    MGUS (monoclonal gammopathy of unknown significance) D47.2       The PSH, FH were reviewed. SH:  Social History     Tobacco Use    Smoking status: Former Smoker     Packs/day: 1.00     Years: 40.00     Pack years: 40.00     Types: Cigarettes     Last attempt to quit: 1980     Years since quittin.9    Smokeless tobacco: Never Used   Substance Use Topics    Alcohol use: Yes     Alcohol/week: 0.8 standard drinks     Types: 1 Glasses of wine per week     Comment: socially once weekly    Drug use: No       Medications/Allergies:  Current Outpatient Medications on File Prior to Visit   Medication Sig Dispense Refill    pantoprazole (PROTONIX) 40 mg tablet Take 1 Tab by mouth two (2) times a day for 14 days.  28 Tab 0    pantoprazole (PROTONIX) 40 mg tablet TAKE 1 TABLET BY MOUTH TWICE DAILY FOR 14 DAYS 28 Tab 0    potassium bicarb-magnesium 21 500-300 mg/6.1 gram pwef Take 1 Dose by mouth.  omega-3 fatty acids 1,000 mg cap Take 4,000 mg by mouth.  levocetirizine (XYZAL) 5 mg tablet       diclofenac sodium 20 mg/gram /actuation(2 %) sopm Apply 2 Squirts to affected area.  budesonide (PULMICORT) 1 mg/2 mL nbsp       b complex-vitamin c-folic acid (NEPHROCAPS) 1 mg capsule Take 1 Cap by mouth.  MULTIVITAMIN PO Take 1 Tab by mouth.  ALLERGY RELIEF, LEVOCETIRIZIN, 5 mg tablet       fexofenadine HCl (ALLEGRA PO) Take 1 Tab by mouth.  ascorbic acid/collagen hydr (COLLAGEN PLUS VITAMIN C PO) by Misc. (Non-Drug; Combo Route) route Once a Day.  olopatadine (PATADAY) 0.2 % drop ophthalmic solution INSTILL 1 DROP INTO BOTH EYES EVERY DAY 2.5 mL 3    triamterene-hydroCHLOROthiazide (DYAZIDE) 37.5-25 mg per capsule Take 1 Cap by mouth daily. 90 Cap 3    quinapril (ACCUPRIL) 40 mg tablet Take 1 Tab by mouth daily. 90 Tab 3    levothyroxine (SYNTHROID) 88 mcg tablet Take 1 Tab by mouth Daily (before breakfast). 90 Tab 3    lovastatin (MEVACOR) 20 mg tablet Take 1 Tab by mouth daily. 90 Tab 3    montelukast (SINGULAIR) 10 mg tablet Take 1 Tab by mouth daily. 90 Tab 3    fluticasone propionate (FLONASE) 50 mcg/actuation nasal spray 2 Sprays by Both Nostrils route daily. Indications: Chronic Inflammation of the Nose Not due to Allergies 3 Bottle 3    omeprazole (PRILOSEC) 40 mg capsule Take 1 Cap by mouth daily as needed (reflux). 90 Cap 3    fexofenadine (ALLEGRA) 180 mg tablet Take 1 Tab by mouth daily as needed for Allergies. 90 Tab 3    hydrALAZINE (APRESOLINE) 25 mg tablet Take 1 Tab by mouth two (2) times a day. 180 Tab 3    therapeutic multivitamin (THERAGRAN) tablet Take 1 Tab by mouth daily.  calcium-cholecalciferol, d3, (CALCIUM 600 + D) 600-125 mg-unit tab Take 1 Tab by mouth two (2) times a day.  ascorbic acid, vitamin C, (VITAMIN C) 500 mg tablet Take 500 mg by mouth daily.  DOCOSAHEXANOIC ACID/EPA (FISH OIL PO) Take 1 Cap by mouth daily.  MSM-collag-co Q10-herb no. [phone] mg tab Take 1 Tab by mouth daily.  b complex vitamins tablet Take 1 Tab by mouth daily.  acetaminophen (TYLENOL) 325 mg tablet Take 325 mg by mouth every four (4) hours as needed for Pain.  [DISCONTINUED] clarithromycin (BIAXIN) 500 mg tablet Take 1 Tab by mouth two (2) times a day for 14 days. 28 Tab 0    [DISCONTINUED] metroNIDAZOLE (FLAGYL) 500 mg tablet Take 1 Tab by mouth three (3) times daily for 14 days. 42 Tab 0    [DISCONTINUED] clarithromycin (BIAXIN) 500 mg tablet Take 1 Tab by mouth two (2) times a day for 14 days. 28 Tab 0    [DISCONTINUED] metroNIDAZOLE (FLAGYL) 500 mg tablet Take 1 Tab by mouth three (3) times daily for 14 days. 42 Tab 0    benzonatate (TESSALON) 200 mg capsule        No current facility-administered medications on file prior to visit.          Allergies   Allergen Reactions    Amlodipine Swelling     Edema of feet/ankles    Amlodipine-Benazepril Swelling    Augmentin [Amoxicillin-Pot Clavulanate] Other (comments)     allopecia    Codeine Unknown (comments)    Levofloxacin Other (comments)     HAIR LOSS    Nitrofurantoin Macrocrystalline Other (comments)     Flu like symptoms    Penicillins Other (comments)     \"It makes me cold and hot\"    Sweat, itch    Sulfa (Sulfonamide Antibiotics) Nausea Only    Sulfamethoxazole-Trimethoprim Other (comments)    Verapamil Other (comments)     Flu like symptoms   fatigue       Objective:  Visit Vitals  /64 (BP 1 Location: Left arm, BP Patient Position: Sitting)   Pulse 91   Temp 97.9 °F (36.6 °C) (Oral)   Resp 18   Ht 5' 6\" (1.676 m)   Wt 149 lb (67.6 kg)   LMP  (LMP Unknown)   SpO2 98%   BMI 24.05 kg/m²      Constitutional: Well developed, nourished, no distress, alert     Labwork and Ancillary Studies:    CBC w/Diff  Lab Results   Component Value Date/Time    WBC 6.9 12/17/2019 12:46 PM    HGB 13.4 12/17/2019 12:46 PM    PLATELET 188 (L) 37/12/1110 12:46 PM         Basic Metabolic Profile/LFTs  Lab Results   Component Value Date/Time    Sodium 140 12/17/2019 12:46 PM    Potassium 3.9 12/17/2019 12:46 PM    Chloride 105 12/17/2019 12:46 PM    CO2 30 12/17/2019 12:46 PM    Anion gap 5 12/17/2019 12:46 PM    Glucose 84 12/17/2019 12:46 PM    BUN 26 (H) 12/17/2019 12:46 PM    Creatinine 0.79 12/17/2019 12:46 PM    BUN/Creatinine ratio 33 (H) 12/17/2019 12:46 PM    GFR est AA >60 12/17/2019 12:46 PM    GFR est non-AA >60 12/17/2019 12:46 PM    Calcium 10.1 12/17/2019 12:46 PM      Lab Results   Component Value Date/Time    ALT (SGPT) 31 12/17/2019 12:46 PM    AST (SGOT) 25 12/17/2019 12:46 PM    Alk.  phosphatase 115 12/17/2019 12:46 PM    Bilirubin, total 0.4 12/17/2019 12:46 PM       Cholesterol  Lab Results   Component Value Date/Time    Cholesterol, total 159 12/04/2019 08:42 AM    HDL Cholesterol 68 (H) 12/04/2019 08:42 AM    LDL, calculated 75.8 12/04/2019 08:42 AM    Triglyceride 76 12/04/2019 08:42 AM    CHOL/HDL Ratio 2.3 12/04/2019 08:42 AM

## 2019-12-24 LAB
ALBUMIN SERPL ELPH-MCNC: 3.9 G/DL (ref 2.9–4.4)
ALBUMIN/GLOB SERPL: 1.3 {RATIO} (ref 0.7–1.7)
ALPHA1 GLOB SERPL ELPH-MCNC: 0.2 G/DL (ref 0–0.4)
ALPHA2 GLOB SERPL ELPH-MCNC: 0.7 G/DL (ref 0.4–1)
B-GLOBULIN SERPL ELPH-MCNC: 1.5 G/DL (ref 0.7–1.3)
GAMMA GLOB SERPL ELPH-MCNC: 0.6 G/DL (ref 0.4–1.8)
GLOBULIN SER-MCNC: 3.1 G/DL (ref 2.2–3.9)
IGA SERPL-MCNC: 1095 MG/DL (ref 64–422)
IGG SERPL-MCNC: 682 MG/DL (ref 700–1600)
IGM SERPL-MCNC: 80 MG/DL (ref 26–217)
INTERPRETATION SERPL IEP-IMP: ABNORMAL
KAPPA LC FREE SER-MCNC: 16.2 MG/L (ref 3.3–19.4)
KAPPA LC FREE/LAMBDA FREE SER: 0.17 {RATIO} (ref 0.26–1.65)
LAMBDA LC FREE SERPL-MCNC: 97.7 MG/L (ref 5.7–26.3)
M PROTEIN SERPL ELPH-MCNC: 0.6 G/DL
PROT SERPL-MCNC: 7 G/DL (ref 6–8.5)

## 2020-01-07 ENCOUNTER — TELEPHONE (OUTPATIENT)
Dept: ONCOLOGY | Age: 80
End: 2020-01-07

## 2020-01-07 ENCOUNTER — HOSPITAL ENCOUNTER (OUTPATIENT)
Dept: INFUSION THERAPY | Age: 80
Discharge: HOME | End: 2020-01-07
Payer: MEDICARE

## 2020-01-07 ENCOUNTER — OFFICE VISIT (OUTPATIENT)
Dept: ONCOLOGY | Age: 80
End: 2020-01-07

## 2020-01-07 VITALS
WEIGHT: 152 LBS | HEIGHT: 66 IN | SYSTOLIC BLOOD PRESSURE: 146 MMHG | BODY MASS INDEX: 24.43 KG/M2 | HEART RATE: 77 BPM | DIASTOLIC BLOOD PRESSURE: 88 MMHG | TEMPERATURE: 97 F | OXYGEN SATURATION: 97 % | RESPIRATION RATE: 18 BRPM

## 2020-01-07 VITALS
HEART RATE: 77 BPM | RESPIRATION RATE: 18 BRPM | SYSTOLIC BLOOD PRESSURE: 146 MMHG | TEMPERATURE: 97 F | OXYGEN SATURATION: 97 % | DIASTOLIC BLOOD PRESSURE: 88 MMHG

## 2020-01-07 DIAGNOSIS — D69.3 CHRONIC ITP (IDIOPATHIC THROMBOCYTOPENIA) (HCC): Primary | ICD-10-CM

## 2020-01-07 DIAGNOSIS — D72.10 EOSINOPHILIA: ICD-10-CM

## 2020-01-07 LAB
BASO+EOS+MONOS # BLD AUTO: 0.9 K/UL (ref 0–2.3)
BASO+EOS+MONOS NFR BLD AUTO: 12 % (ref 0.1–17)
DIFFERENTIAL METHOD BLD: ABNORMAL
ERYTHROCYTE [DISTWIDTH] IN BLOOD BY AUTOMATED COUNT: 12.8 % (ref 11.5–14.5)
HCT VFR BLD AUTO: 42.5 % (ref 36–48)
HGB BLD-MCNC: 13.8 G/DL (ref 12–16)
LYMPHOCYTES # BLD: 2 K/UL (ref 1.1–5.9)
LYMPHOCYTES NFR BLD: 25 % (ref 14–44)
MCH RBC QN AUTO: 29 PG (ref 25–35)
MCHC RBC AUTO-ENTMCNC: 32.5 G/DL (ref 31–37)
MCV RBC AUTO: 89.3 FL (ref 78–102)
NEUTS SEG # BLD: 5 K/UL (ref 1.8–9.5)
NEUTS SEG NFR BLD: 63 % (ref 40–70)
PLATELET # BLD AUTO: 112 K/UL (ref 140–440)
RBC # BLD AUTO: 4.76 M/UL (ref 4.1–5.1)
WBC # BLD AUTO: 7.9 K/UL (ref 4.5–13)

## 2020-01-07 PROCEDURE — 36415 COLL VENOUS BLD VENIPUNCTURE: CPT

## 2020-01-07 PROCEDURE — 85025 COMPLETE CBC W/AUTO DIFF WBC: CPT

## 2020-01-07 RX ORDER — CLARITHROMYCIN 500 MG/1
TABLET, FILM COATED ORAL
COMMUNITY
Start: 2019-12-20 | End: 2022-02-04

## 2020-01-07 NOTE — PROGRESS NOTES
[facility]  [id number] [place] [address], [address]  [place], [facility] [place]  Office Phone: [phone]  Fax: [phone]      Reason for visit:  Thrombocytopenia    HPI:   Lott Sever is a 67 y.o.  female who I was asked to see in consultation at the request of self  for evaluation for second opinion for thrombocytopenia. Patient was being seen by Dr. Mary Landon at [facility]. I saw her on 12/17/2019 and ordered labs including H. pylori which came back positive. Patient was treated with antibiotics, and she is here today for follow-up. From review of the notes provided it seems that her platelet count mostly ranges from 52697 to 254701. Immune electrophoresis 09/21/2016 showed an IgA lambda monoclonal gammopathy. Bone marrow biopsy and aspirate 10/26/2016 showed a variably cellular marrow with trilineage hematopoiesis. There was traced stainable iron. There was no evidence of suspicious T-cell, B-cell, plasma cell or abnormal myeloid population. FISH study showed 5q- in 5% of cells and monosomy 5 in 4% of cells. Chromosome analysis showed 55, XX. On 11/23/2016 she received 4 consecutive days of oral dexamethasone. Platelet count increased to 174,000 then return to the 60,000 range. From 7/17/20178/31/2017, she received Rituxan x4. Labs on 12/11/2019 showed WBC 7.2, H&H 13.5/41.3, platelet 33,393. Absolute eosinophil count was 0.3 (00 0.2). K=16.3 was 12 in July 2019  L =101 was 70.5 in July 2019  K/L=0.16 was 0.18 in July 2019  SPEP M spike of 0.7. Total protein 7.1, albumin 4.1, IgA was elevated at 1197 was 810 in July 2019  Presents with left ankle strengthening doing UPEP showed 0.7 g/dL M spike. TSH was normal.  Platelet associated antibody to IIb/IIa, anti-Ib/X, anti-Ia/IIa were all negative. Glycoprotein for antibody was also negative. On review of systems today her only complaint is easy bruising.   She otherwise denies any bright red blood per rectum, epistaxis, hematemesis, or melena. No focal neurologic deficit. All other point of review of system have been reviewed and were negative. ECOG performance status 0. Independent with ADLs and IADLs    DX: Thrombocytopenia    Past Medical History:   Diagnosis Date    Acid indigestion     Essential hypertension, benign     History of EMG 2017    Normal study without electrical evidence of a focal / peripheral neuropathy of C5-T1 radiculopathy affecting the R arm.  Sinus problem     Unspecified hypothyroidism      Past Surgical History:   Procedure Laterality Date    HX BACK SURGERY      disc    HX PARTIAL HYSTERECTOMY      HX SHOULDER ARTHROSCOPY Right 03/10/2017    subA decompression, distal clavicle excision, partial thickness cuff tear debridement     Social History     Socioeconomic History    Marital status:      Spouse name: Not on file    Number of children: Not on file    Years of education: Not on file    Highest education level: Not on file   Tobacco Use    Smoking status: Former Smoker     Packs/day: 1.00     Years: 40.00     Pack years: 40.00     Types: Cigarettes     Last attempt to quit: 1980     Years since quittin.0    Smokeless tobacco: Never Used   Substance and Sexual Activity    Alcohol use: Yes     Alcohol/week: 0.8 standard drinks     Types: 1 Glasses of wine per week     Comment: socially once weekly    Drug use: No    Sexual activity: Not Currently     Family History   Problem Relation Age of Onset    Stroke Mother     Hypertension Mother     Lung Cancer Father     Colon Cancer Sister     Lung Cancer Brother        Current Outpatient Medications   Medication Sig Dispense Refill    clarithromycin (BIAXIN) 500 mg tablet TK 1 T PO BID FOR 14 DAYS      potassium bicarb-magnesium 21 500-300 mg/6.1 gram pwef Take 1 Dose by mouth.  omega-3 fatty acids 1,000 mg cap Take 4,000 mg by mouth.       levocetirizine (XYZAL) 5 mg tablet       diclofenac sodium 20 mg/gram /actuation(2 %) sopm Apply 2 Squirts to affected area.  budesonide (PULMICORT) 1 mg/2 mL nbsp       benzonatate (TESSALON) 200 mg capsule       b complex-vitamin c-folic acid (NEPHROCAPS) 1 mg capsule Take 1 Cap by mouth.  MULTIVITAMIN PO Take 1 Tab by mouth.  ALLERGY RELIEF, LEVOCETIRIZIN, 5 mg tablet       fexofenadine HCl (ALLEGRA PO) Take 1 Tab by mouth.  ascorbic acid/collagen hydr (COLLAGEN PLUS VITAMIN C PO) by Misc. (Non-Drug; Combo Route) route Once a Day.  olopatadine (PATADAY) 0.2 % drop ophthalmic solution INSTILL 1 DROP INTO BOTH EYES EVERY DAY 2.5 mL 3    triamterene-hydroCHLOROthiazide (DYAZIDE) 37.5-25 mg per capsule Take 1 Cap by mouth daily. 90 Cap 3    quinapril (ACCUPRIL) 40 mg tablet Take 1 Tab by mouth daily. 90 Tab 3    levothyroxine (SYNTHROID) 88 mcg tablet Take 1 Tab by mouth Daily (before breakfast). 90 Tab 3    lovastatin (MEVACOR) 20 mg tablet Take 1 Tab by mouth daily. 90 Tab 3    montelukast (SINGULAIR) 10 mg tablet Take 1 Tab by mouth daily. 90 Tab 3    fluticasone propionate (FLONASE) 50 mcg/actuation nasal spray 2 Sprays by Both Nostrils route daily. Indications: Chronic Inflammation of the Nose Not due to Allergies 3 Bottle 3    omeprazole (PRILOSEC) 40 mg capsule Take 1 Cap by mouth daily as needed (reflux). 90 Cap 3    fexofenadine (ALLEGRA) 180 mg tablet Take 1 Tab by mouth daily as needed for Allergies. 90 Tab 3    hydrALAZINE (APRESOLINE) 25 mg tablet Take 1 Tab by mouth two (2) times a day. 180 Tab 3    therapeutic multivitamin (THERAGRAN) tablet Take 1 Tab by mouth daily.  calcium-cholecalciferol, d3, (CALCIUM 600 + D) 600-125 mg-unit tab Take 1 Tab by mouth two (2) times a day.  ascorbic acid, vitamin C, (VITAMIN C) 500 mg tablet Take 500 mg by mouth daily.  DOCOSAHEXANOIC ACID/EPA (FISH OIL PO) Take 1 Cap by mouth daily.  MSM-[employer]-co Q10-herb no. [phone] mg tab Take 1 Tab by mouth daily.       b complex vitamins tablet Take 1 Tab by mouth daily.  acetaminophen (TYLENOL) 325 mg tablet Take 325 mg by mouth every four (4) hours as needed for Pain. Allergies   Allergen Reactions    Amlodipine Swelling     Edema of feet/ankles    Amlodipine-Benazepril Swelling    Augmentin [Amoxicillin-Pot Clavulanate] Other (comments)     allopecia    Codeine Unknown (comments)    Levofloxacin Other (comments)     HAIR LOSS    Nitrofurantoin Macrocrystalline Other (comments)     Flu like symptoms    Penicillins Other (comments)     \"It makes me cold and hot\"    Sweat, itch    Sulfa (Sulfonamide Antibiotics) Nausea Only    Sulfamethoxazole-Trimethoprim Other (comments)    Verapamil Other (comments)     Flu like symptoms   fatigue       Review of Systems  As per HPI  Objective:  Physical Exam:  Visit Vitals  /88 (BP 1 Location: Left arm, BP Patient Position: Sitting)   Pulse 77   Temp 97 °F (36.1 °C) (Oral)   Resp 18   Ht 5' 6\" (1.676 m)   Wt 68.9 kg (152 lb)   LMP  (LMP Unknown)   SpO2 97%   BMI 24.53 kg/m²       General:  Alert, cooperative, no distress, appears stated age. Head:  Normocephalic, without obvious abnormality, atraumatic. Eyes:  Conjunctivae/corneas clear. PERRL, EOMs intact. Throat: Lips, mucosa, and tongue normal.    Neck: Supple, symmetrical, trachea midline, no adenopathy, thyroid: no enlargement/tenderness/nodules   Back:   Symmetric, no curvature. ROM normal. No CVA tenderness. Lungs:   Clear to auscultation bilaterally. Chest wall:  No tenderness or deformity. Heart:  Regular rate and rhythm, S1, S2 normal, no murmur, click, rub or gallop. Abdomen:   Soft, non-tender. Bowel sounds normal. No masses,  No organomegaly. Extremities: Extremities normal, atraumatic, no cyanosis or edema. Skin: Skin color, texture, turgor normal. No rashes or lesions. Lymph nodes: Cervical, supraclavicular, and axillary nodes normal.   Neurologic: CNII-XII intact. Diagnostic Imaging     Results for orders placed in visit on 04/15/19   CT HEAD WO CONT       Lab Results  Lab Results   Component Value Date/Time    WBC 6.9 12/17/2019 12:46 PM    HGB 13.4 12/17/2019 12:46 PM    HCT 41.9 12/17/2019 12:46 PM    PLATELET 864 (L) 55/33/9680 12:46 PM    MCV 91.7 12/17/2019 12:46 PM       Lab Results   Component Value Date/Time    Sodium 140 12/17/2019 12:46 PM    Potassium 3.9 12/17/2019 12:46 PM    Chloride 105 12/17/2019 12:46 PM    CO2 30 12/17/2019 12:46 PM    Anion gap 5 12/17/2019 12:46 PM    Glucose 84 12/17/2019 12:46 PM    BUN 26 (H) 12/17/2019 12:46 PM    Creatinine 0.79 12/17/2019 12:46 PM    BUN/Creatinine ratio 33 (H) 12/17/2019 12:46 PM    GFR est AA >60 12/17/2019 12:46 PM    GFR est non-AA >60 12/17/2019 12:46 PM    Calcium 10.1 12/17/2019 12:46 PM    AST (SGOT) 25 12/17/2019 12:46 PM    Alk. phosphatase 115 12/17/2019 12:46 PM    Protein, total 7.0 12/17/2019 12:46 PM    Protein, total 7.7 12/17/2019 12:46 PM    Albumin 4.3 12/17/2019 12:46 PM    Globulin 3.4 12/17/2019 12:46 PM    A-G Ratio 1.3 12/17/2019 12:46 PM    ALT (SGPT) 31 12/17/2019 12:46 PM     No up with PCP for health maintenance  Assessment/Plan:  67 y.o. female with   1. Chronic ITP (idiopathic thrombocytopenia) (HCC)  Patient has ITP which responded well to steroids and Rituxan in the past. She has no history of bleeding and knows to stay away from ASA and NSAIDs. She has been appropriately managed by Lasr Le and his team at [facility]. 15. I told her that the goal of ITP treatment is to stop or prevent bleeding and she is currently at low risk for bleeding with platelets running between 60,000-120,000. Labs on 12/17/2019 showed IgG 682, IgA 1095, IgM 80. M spike 0.6, immunofixation showed monoclonal IgA lambda restricted protein. WBC 6.9, H&H 13.4/41.9, platelet 122. BUN 20 creatinine 0.79. Calcium 10.1. Albumin 4.3, total protein 7.0. Negative strongyloidiasis serology.   Positive H. Pylori. *Completed treatment for H. pylori. *Recheck CBC today    2. Eosinophilia  She has mild acute renal failure which is possibly secondary to her allergies she says. Negative stool for ova and parasites. Negative strongyloidiasis. Positive H. pylori. Patient has been treated. Repeat CBC with differential today. 3. MGUS (monoclonal gammopathy of unknown significance)  Patient has IgA MGUS which has been stable. There was a slight increase on the light chain recently though. Labs on 12/17/2019 showed IgG 682, IgA 1095, IgM 80. M spike 0.6, immunofixation showed monoclonal IgA lambda restricted protein. WBC 6.9, H&H 13.4/41.9, platelet 531. BUN 20 creatinine 0.79. Calcium 10.1. Albumin 4.3, total protein 7.0. Continue follow-up. RTC in 6 months with repeat labs.       Return  follow with her primary hematologist Dr. Virgilio Ludwig

## 2020-01-07 NOTE — PROGRESS NOTES
[facility] Progress Note    Date: 2020    Name: Sylvain Owens    MRN: [id number]         : 1940    Peripheral Lab Draw      Ms. Dove to [place], ambulatory at 1145 accompanied by 1155. Pt was assessed and education was provided. Ms. Dove's vitals were reviewed and patient was observed for 5 minutes prior to treatment. Visit Vitals  /88 (BP 1 Location: Left arm, BP Patient Position: Sitting)   Pulse 77   Temp 97 °F (36.1 °C) (Oral)   Resp 18   SpO2 97%     Recent Results (from the past 12 hour(s))   CBC WITH 3 PART DIFF    Collection Time: 20 11:35 AM   Result Value Ref Range    WBC 7.9 4.5 - 13.0 K/uL    RBC 4.76 4.10 - 5.10 M/uL    HGB 13.8 12.0 - 16.0 g/dL    HCT 42.5 36 - 48 %    MCV 89.3 78 - 102 FL    MCH 29.0 25.0 - 35.0 PG    MCHC 32.5 31 - 37 g/dL    RDW 12.8 11.5 - 14.5 %    PLATELET 940 (L) 671 - 440 K/uL    NEUTROPHILS 63 40 - 70 %    MIXED CELLS 12 0.1 - 17 %    LYMPHOCYTES 25 14 - 44 %    ABS. NEUTROPHILS 5.0 1.8 - 9.5 K/UL    ABS. MIXED CELLS 0.9 0.0 - 2.3 K/uL    ABS. LYMPHOCYTES 2.0 1.1 - 5.9 K/UL    DF AUTOMATED         Blood obtained peripherally from left arm x 1 attempt with butterfly needle and sent to lab for Cbc w/diff per written orders. No bleeding or hematoma noted at site. Gauze and coban applied. Ms. Jyotsna Gee tolerated the phlebotomy, and had no complaints. Patient armband removed and shredded. Ms. Jyotsna Gee was discharged from [place] [address] in stable condition at 1155.      Matthew Díaz Phlebotomist PCT  2020  12:02 PM

## 2020-01-07 NOTE — PROGRESS NOTES
Melany Timmons is a 67 y.o. female presenting today for a follow-up appointment r/t thrombocytopenia. Patient is ambulatory with no assistive devices and is accompanied by Flavia Healy, daughter. Patient reports of no generalized pain and denies any other complaints at this time. Chief Complaint   Patient presents with    Thrombocytopenia    Follow-up     Visit Vitals  /88 (BP 1 Location: Left arm, BP Patient Position: Sitting)   Pulse 77   Temp 97 °F (36.1 °C) (Oral)   Resp 18   Ht 5' 6\" (1.676 m)   Wt 152 lb (68.9 kg)   LMP  (LMP Unknown)   SpO2 97%   BMI 24.53 kg/m²     Current Outpatient Medications   Medication Sig    clarithromycin (BIAXIN) 500 mg tablet TK 1 T PO BID FOR 14 DAYS    potassium bicarb-magnesium 21 500-300 mg/6.1 gram pwef Take 1 Dose by mouth.  omega-3 fatty acids 1,000 mg cap Take 4,000 mg by mouth.  levocetirizine (XYZAL) 5 mg tablet     diclofenac sodium 20 mg/gram /actuation(2 %) sopm Apply 2 Squirts to affected area.  budesonide (PULMICORT) 1 mg/2 mL nbsp     benzonatate (TESSALON) 200 mg capsule     b complex-vitamin c-folic acid (NEPHROCAPS) 1 mg capsule Take 1 Cap by mouth.  MULTIVITAMIN PO Take 1 Tab by mouth.  ALLERGY RELIEF, LEVOCETIRIZIN, 5 mg tablet     fexofenadine HCl (ALLEGRA PO) Take 1 Tab by mouth.  ascorbic acid/collagen hydr (COLLAGEN PLUS VITAMIN C PO) by Misc. (Non-Drug; Combo Route) route Once a Day.  olopatadine (PATADAY) 0.2 % drop ophthalmic solution INSTILL 1 DROP INTO BOTH EYES EVERY DAY    triamterene-hydroCHLOROthiazide (DYAZIDE) 37.5-25 mg per capsule Take 1 Cap by mouth daily.  quinapril (ACCUPRIL) 40 mg tablet Take 1 Tab by mouth daily.  levothyroxine (SYNTHROID) 88 mcg tablet Take 1 Tab by mouth Daily (before breakfast).  lovastatin (MEVACOR) 20 mg tablet Take 1 Tab by mouth daily.  montelukast (SINGULAIR) 10 mg tablet Take 1 Tab by mouth daily.     fluticasone propionate (FLONASE) 50 mcg/actuation nasal spray 2 Sprays by Both Nostrils route daily. Indications: Chronic Inflammation of the Nose Not due to Allergies    omeprazole (PRILOSEC) 40 mg capsule Take 1 Cap by mouth daily as needed (reflux).  fexofenadine (ALLEGRA) 180 mg tablet Take 1 Tab by mouth daily as needed for Allergies.  hydrALAZINE (APRESOLINE) 25 mg tablet Take 1 Tab by mouth two (2) times a day.  therapeutic multivitamin (THERAGRAN) tablet Take 1 Tab by mouth daily.  calcium-cholecalciferol, d3, (CALCIUM 600 + D) 600-125 mg-unit tab Take 1 Tab by mouth two (2) times a day.  ascorbic acid, vitamin C, (VITAMIN C) 500 mg tablet Take 500 mg by mouth daily.  DOCOSAHEXANOIC ACID/EPA (FISH OIL PO) Take 1 Cap by mouth daily.  [facility] Q[facility]-herb no. 226 [phone] mg tab Take 1 Tab by mouth daily.  b complex vitamins tablet Take 1 Tab by mouth daily.  acetaminophen (TYLENOL) 325 mg tablet Take 325 mg by mouth every four (4) hours as needed for Pain. No current facility-administered medications for this visit. Medications no longer taking/discontinued: none    Patient currently taking Antiplatelet therapy? no    Fall Risk Assessment, last 12 mths 1/7/2020   Able to walk? Yes   Fall in past 12 months? No     3 most recent PHQ Screens 1/7/2020   Little interest or pleasure in doing things Not at all   Feeling down, depressed, irritable, or hopeless Not at all   Total Score PHQ 2 0     Abuse Screening Questionnaire 3/26/2019   Do you ever feel afraid of your partner? N   Are you in a relationship with someone who physically or mentally threatens you? N   Is it safe for you to go home? Y     Health Maintenance Due   Topic Date Due    Shingrix Vaccine Age 49> (1 of 2) 06/05/1991    Influenza Age 5 to Adult  08/01/2019     1. Have you been to the ER, urgent care clinic since your last visit? Hospitalized since your last visit? No     2.  Have you seen or consulted any other health care providers outside of the [facility] since your last visit? Include any pap smears or colon screening.  No

## 2020-06-09 ENCOUNTER — DOCUMENTATION ONLY (OUTPATIENT)
Dept: ONCOLOGY | Age: 80
End: 2020-06-09

## 2020-06-09 ENCOUNTER — TELEPHONE (OUTPATIENT)
Dept: ONCOLOGY | Age: 80
End: 2020-06-09

## 2020-06-09 NOTE — TELEPHONE ENCOUNTER
Patient would like to know if she should stop taking probiotics before having lab work done; specifically H Pylori?  Please advise and f/u

## 2020-06-09 NOTE — PROGRESS NOTES
Patient was called today, no answer. Message left that she can stop taking the probiotic before her lab works.  For other questions she can call back at [phone]

## 2020-07-29 DIAGNOSIS — D72.10 EOSINOPHILIA: ICD-10-CM

## 2020-07-29 DIAGNOSIS — D69.3 CHRONIC ITP (IDIOPATHIC THROMBOCYTOPENIA) (HCC): Primary | Chronic | ICD-10-CM

## 2020-07-29 DIAGNOSIS — D69.3 CHRONIC ITP (IDIOPATHIC THROMBOCYTOPENIA) (HCC): Chronic | ICD-10-CM

## 2020-08-03 ENCOUNTER — TELEPHONE (OUTPATIENT)
Dept: ONCOLOGY | Age: 80
End: 2020-08-03

## 2020-08-03 ENCOUNTER — VIRTUAL VISIT (OUTPATIENT)
Dept: ONCOLOGY | Age: 80
End: 2020-08-03

## 2020-08-03 DIAGNOSIS — D47.2 MGUS (MONOCLONAL GAMMOPATHY OF UNKNOWN SIGNIFICANCE): ICD-10-CM

## 2020-08-03 DIAGNOSIS — D69.3 CHRONIC ITP (IDIOPATHIC THROMBOCYTOPENIA) (HCC): Primary | ICD-10-CM

## 2020-08-03 DIAGNOSIS — D72.10 EOSINOPHILIA: ICD-10-CM
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
## 2020-08-04 RX ORDER — PREDNISONE 10 MG/1
10 TABLET ORAL DAILY
Qty: 48 TAB | Refills: 0 | OUTPATIENT
Start: 2020-08-04 | End: 2022-02-04

## 2020-08-11 ENCOUNTER — VIRTUAL VISIT (OUTPATIENT)
Dept: ONCOLOGY | Age: 80
End: 2020-08-11

## 2020-08-11 DIAGNOSIS — D69.3 ACUTE ITP (HCC): Primary | ICD-10-CM

## 2020-08-11 NOTE — PROGRESS NOTES
Abbie Zeng is a 79 y.o. female who was seen by synchronous (real-time) audio- technology on 8/11/2020. Assessment & Plan:   Diagnoses and all orders for this visit:    1. Acute ITP (HCC)  -     CBC WITH AUTOMATED DIFF; Future  -     H. PYLORI BREATH TEST; Future      The complexity of medical decision making for this visit is moderate       1. Chronic ITP (idiopathic thrombocytopenia) (HCC)  Patient has ITP which responded well to steroids and Rituxan in the past. She has no history of bleeding and knows to stay away from ASA and NSAIDs. She has been appropriately managed by Joanne Buchanan and his team at [facility]. 15. I told her that the goal of ITP treatment is to stop or prevent bleeding and she is currently at low risk for bleeding with platelets running between 60,000-120,000. Labs on 12/17/2019 showed IgG 682, IgA 1095, IgM 80. M spike 0.6, immunofixation showed monoclonal IgA lambda restricted protein. *On 7/29/2020, WBC 7.0, H&H 13.3/39.6, MCV 87, platelets 52,238. IPF 14. 8. H pylori IgG = elevated at 2.0. Normal BUN and creat. *Completed treatment for H. Pylori. *Check H pylori breath test. If positive then treat  H pylori first before TPO. If negative then ok to start TPO. *Recheck CBC after prednisone in one week     2. Eosinophilia  Resolved.     3. MGUS (monoclonal gammopathy of unknown significance)  Patient has IgA MGUS which has been stable. There was a slight increase on the light chain recently though. Labs on 12/17/2019 showed IgG 682, IgA 1095, IgM 80. M spike 0.6, immunofixation showed monoclonal IgA lambda restricted protein. WBC 6.9, H&H 13.4/41.9, platelet 213. BUN 20 creatinine 0.79. Calcium 10.1. Albumin 4.3, total protein 7.0. Continue follow-up. RTC one week with repeat CBC in office.     I spent at least 25 minutes on this visit with this established patient.   712  Subjective:   Abbie Zeng is a 67 y.o.  female who I was asked to see in consultation at the request of self  for evaluation for second opinion for thrombocytopenia. Patient was being seen by Dr. Edy Barton at [facility]. I saw her on 12/17/2019 and ordered labs including H. pylori which came back positive. Patient was treated with antibiotics, and she is here today for follow-up.     From review of the notes provided it seems that her platelet count mostly ranges from 35945 to 390154. Immune electrophoresis 09/21/2016 showed an IgA lambda monoclonal gammopathy. Bone marrow biopsy and aspirate 10/26/2016 showed a variably cellular marrow with trilineage hematopoiesis. There was traced stainable iron. There was no evidence of suspicious T-cell, B-cell, plasma cell or abnormal myeloid population. FISH study showed 5q- in 5% of cells and monosomy 5 in 4% of cells. Chromosome analysis showed 55, XX. On 11/23/2016 she received 4 consecutive days of oral dexamethasone. Platelet count increased to 174,000 then return to the 60,000 range.   From 7/17/20178/31/2017, she received Rituxan x4.     Labs on 12/11/2019 showed WBC 7.2, H&H 13.5/41.3, platelet 25,892. Absolute eosinophil count was 0.3 (00 0.2). K=16.3 was 12 in July 2019  L =101 was 70.5 in July 2019  K/L=0.16 was 0.18 in July 2019  SPEP M spike of 0.7. Total protein 7.1, albumin 4.1, IgA was elevated at 1197 was 810 in July 2019  Presents with left ankle strengthening doing UPEP showed 0.7 g/dL M spike. TSH was normal.  Platelet associated antibody to IIb/IIa, anti-Ib/X, anti-Ia/IIa were all negative. Glycoprotein for antibody was also negative.     On review of systems today her only complaint is easy bruising. She otherwise denies any bright red blood per rectum, epistaxis, hematemesis, or melena. No focal neurologic deficit. All other point of review of system have been reviewed and were negative. ECOG performance status 0. Independent with ADLs and IADLs       Prior to Admission medications    Medication Sig Start Date End Date Taking?  Authorizing Provider   predniSONE (DELTASONE) 10 mg tablet Take 10 mg by mouth daily. Taper 15 days: 60mg x 3 days, 40mg x 3 days, 30mg x 3 days, 20mg x 3 days then 10mg x 3days 8/4/20   Shiva Galloway, NP   clarithromycin (BIAXIN) 500 mg tablet TK 1 T PO BID FOR 14 DAYS 12/20/19   Provider, Historical   potassium bicarb-magnesium 21 500-300 mg/6.1 gram pwef Take 1 Dose by mouth. Provider, Historical   omega-3 fatty acids 1,000 mg cap Take 4,000 mg by mouth. Provider, Historical   levocetirizine (XYZAL) 5 mg tablet  11/6/19   Provider, Historical   diclofenac sodium 20 mg/gram /actuation(2 %) sopm Apply 2 Squirts to affected area. 9/15/17   Provider, Historical   budesonide (PULMICORT) 1 mg/2 mL nbsp  11/27/19   Provider, Historical   benzonatate (TESSALON) 200 mg capsule  11/27/19   Provider, Historical   b complex-vitamin c-folic acid (NEPHROCAPS) 1 mg capsule Take 1 Cap by mouth. Provider, Historical   MULTIVITAMIN PO Take 1 Tab by mouth. Provider, Historical   ALLERGY RELIEF, LEVOCETIRIZIN, 5 mg tablet  11/6/19   Provider, Historical   fexofenadine HCl (ALLEGRA PO) Take 1 Tab by mouth. Provider, Historical   ascorbic acid/collagen hydr (COLLAGEN PLUS VITAMIN C PO) by Misc. (Non-Drug; Combo Route) route Once a Day. Provider, Historical   olopatadine (PATADAY) 0.2 % drop ophthalmic solution INSTILL 1 DROP INTO BOTH EYES EVERY DAY 11/27/19   Buzz Daniels MD   triamterene-hydroCHLOROthiazide (DYAZIDE) 37.5-25 mg per capsule Take 1 Cap by mouth daily. 3/26/19   Onesimo Wilson MD   quinapril (ACCUPRIL) 40 mg tablet Take 1 Tab by mouth daily. 3/26/19   Onesimo Wilson MD   levothyroxine (SYNTHROID) 88 mcg tablet Take 1 Tab by mouth Daily (before breakfast). 3/26/19   Onesimo Wilson MD   lovastatin (MEVACOR) 20 mg tablet Take 1 Tab by mouth daily. 3/26/19   Onesimo Wilson MD   montelukast (SINGULAIR) 10 mg tablet Take 1 Tab by mouth daily.  3/26/19   Onesimo Wilson MD   fluticasone propionate (FLONASE) 50 mcg/actuation nasal spray 2 Sprays by Both Nostrils route daily. Indications: Chronic Inflammation of the Nose Not due to Allergies 3/26/19   Presley White MD   omeprazole (PRILOSEC) 40 mg capsule Take 1 Cap by mouth daily as needed (reflux). 3/26/19   Presley White MD   fexofenadine (ALLEGRA) 180 mg tablet Take 1 Tab by mouth daily as needed for Allergies. 3/26/19   Presley White MD   hydrALAZINE (APRESOLINE) 25 mg tablet Take 1 Tab by mouth two (2) times a day. 3/26/19   Presley White MD   therapeutic multivitamin [facility]) tablet Take 1 Tab by mouth daily. Provider, Historical   calcium-cholecalciferol, d3, (CALCIUM 600 + D) 600-125 mg-unit tab Take 1 Tab by mouth two (2) times a day. Provider, Historical   ascorbic acid, vitamin C, (VITAMIN C) 500 mg tablet Take 500 mg by mouth daily. Provider, Historical   DOCOSAHEXANOIC ACID/EPA (FISH OIL PO) Take 1 Cap by mouth daily. Provider, Historical   MSM-collag-co F76-xpok no. [phone] mg tab Take 1 Tab by mouth daily. Provider, Historical   b complex vitamins tablet Take 1 Tab by mouth daily. Provider, Historical   acetaminophen (TYLENOL) 325 mg tablet Take 325 mg by mouth every four (4) hours as needed for Pain.     Provider, Historical     Patient Active Problem List   Diagnosis Code    Essential hypertension I10    Hyperlipidemia E78.5    Acquired hypothyroidism E03.9    Impingement syndrome of right shoulder M75.41    Primary insomnia F51.01    Chronic ITP (idiopathic thrombocytopenia) ([facility]) D69.3    GERD (gastroesophageal reflux disease) K21.9    Sensorineural hearing loss, bilateral H90.3    Arthritis of right shoulder region M19.011    Zambian speaking patient [id number]    DDD (degenerative disc disease), cervical M50.30    Headache, chronic daily R51    Osteopenia of multiple sites M85.89    Non-seasonal allergic rhinitis due to pollen J30.1    Eosinophilia D72.1    MGUS (monoclonal gammopathy of unknown significance) D47.2     Patient Active Problem List    Diagnosis Date Noted    Eosinophilia 12/17/2019    MGUS (monoclonal gammopathy of unknown significance) 12/17/2019    Non-seasonal allergic rhinitis due to pollen 04/23/2018    Osteopenia of multiple sites 03/08/2018    Headache, chronic daily 02/22/2018    DDD (degenerative disc disease), cervical 02/21/2018    Russian speaking patient 01/28/2018    Arthritis of right shoulder region 01/24/2018    Essential hypertension 01/23/2018    Hyperlipidemia 01/23/2018    Acquired hypothyroidism 01/23/2018    GERD (gastroesophageal reflux disease) 01/23/2018    Primary insomnia 03/15/2017    Impingement syndrome of right shoulder 02/27/2017    Chronic ITP (idiopathic thrombocytopenia) ([facility]) 12/19/2016    Sensorineural hearing loss, bilateral 04/29/2013     Current Outpatient Medications   Medication Sig Dispense Refill    predniSONE (DELTASONE) 10 mg tablet Take 10 mg by mouth daily. Taper 15 days: 60mg x 3 days, 40mg x 3 days, 30mg x 3 days, 20mg x 3 days then 10mg x 3days 48 Tab 0    clarithromycin (BIAXIN) 500 mg tablet TK 1 T PO BID FOR 14 DAYS      potassium bicarb-magnesium 21 500-300 mg/6.1 gram pwef Take 1 Dose by mouth.  omega-3 fatty acids 1,000 mg cap Take 4,000 mg by mouth.  levocetirizine (XYZAL) 5 mg tablet       diclofenac sodium 20 mg/gram /actuation(2 %) sopm Apply 2 Squirts to affected area.  budesonide (PULMICORT) 1 mg/2 mL nbsp       benzonatate (TESSALON) 200 mg capsule       b complex-vitamin c-folic acid (NEPHROCAPS) 1 mg capsule Take 1 Cap by mouth.  MULTIVITAMIN PO Take 1 Tab by mouth.  ALLERGY RELIEF, LEVOCETIRIZIN, 5 mg tablet       fexofenadine HCl (ALLEGRA PO) Take 1 Tab by mouth.  ascorbic acid/collagen hydr (COLLAGEN PLUS VITAMIN C PO) by Misc. (Non-Drug; Combo Route) route Once a Day.       olopatadine (PATADAY) 0.2 % drop ophthalmic solution INSTILL 1 DROP INTO BOTH EYES EVERY DAY 2.5 mL 3    triamterene-hydroCHLOROthiazide (DYAZIDE) 37.5-25 mg per capsule Take 1 Cap by mouth daily. 90 Cap 3    quinapril (ACCUPRIL) 40 mg tablet Take 1 Tab by mouth daily. 90 Tab 3    levothyroxine (SYNTHROID) 88 mcg tablet Take 1 Tab by mouth Daily (before breakfast). 90 Tab 3    lovastatin (MEVACOR) 20 mg tablet Take 1 Tab by mouth daily. 90 Tab 3    montelukast (SINGULAIR) 10 mg tablet Take 1 Tab by mouth daily. 90 Tab 3    fluticasone propionate (FLONASE) 50 mcg/actuation nasal spray 2 Sprays by Both Nostrils route daily. Indications: Chronic Inflammation of the Nose Not due to Allergies 3 Bottle 3    omeprazole (PRILOSEC) 40 mg capsule Take 1 Cap by mouth daily as needed (reflux). 90 Cap 3    fexofenadine (ALLEGRA) 180 mg tablet Take 1 Tab by mouth daily as needed for Allergies. 90 Tab 3    hydrALAZINE (APRESOLINE) 25 mg tablet Take 1 Tab by mouth two (2) times a day. 180 Tab 3    therapeutic multivitamin (THERAGRAN) tablet Take 1 Tab by mouth daily.  calcium-cholecalciferol, d3, (CALCIUM 600 + D) 600-125 mg-unit tab Take 1 Tab by mouth two (2) times a day.  ascorbic acid, vitamin C, (VITAMIN C) 500 mg tablet Take 500 mg by mouth daily.  DOCOSAHEXANOIC ACID/EPA (FISH OIL PO) Take 1 Cap by mouth daily.  MSM-[facility]-co Q10-herb no. 226 [phone] mg tab Take 1 Tab by mouth daily.  b complex vitamins tablet Take 1 Tab by mouth daily.  acetaminophen (TYLENOL) 325 mg tablet Take 325 mg by mouth every four (4) hours as needed for Pain.        Allergies   Allergen Reactions    Amlodipine Swelling     Edema of feet/ankles    Amlodipine-Benazepril Swelling    Augmentin [Amoxicillin-Pot Clavulanate] Other (comments)     allopecia    Codeine Unknown (comments)    Levofloxacin Other (comments)     HAIR LOSS    Nitrofurantoin Macrocrystalline Other (comments)     Flu like symptoms    Penicillins Other (comments)     \"It makes me cold and hot\"    Sweat, itch    Sulfa (Sulfonamide Antibiotics) Nausea Only    Sulfamethoxazole-Trimethoprim Other (comments)    Verapamil Other (comments)     Flu like symptoms   fatigue     Past Medical History:   Diagnosis Date    Acid indigestion     Essential hypertension, benign     History of EMG 2017    Normal study without electrical evidence of a focal / peripheral neuropathy of C5-T1 radiculopathy affecting the R arm.  Sinus problem     Unspecified hypothyroidism      Past Surgical History:   Procedure Laterality Date    HX BACK SURGERY      disc    HX PARTIAL HYSTERECTOMY      HX SHOULDER ARTHROSCOPY Right 03/10/2017    subA decompression, distal clavicle excision, partial thickness cuff tear debridement     Family History   Problem Relation Age of Onset    Stroke Mother     Hypertension Mother     Lung Cancer Father     Colon Cancer Sister     Lung Cancer Brother      Social History     Tobacco Use    Smoking status: Former Smoker     Packs/day: 1.00     Years: 40.00     Pack years: 40.00     Types: Cigarettes     Last attempt to quit: 1980     Years since quittin.6    Smokeless tobacco: Never Used   Substance Use Topics    Alcohol use: Yes     Alcohol/week: 0.8 standard drinks     Types: 1 Glasses of wine per week     Comment: socially once weekly       ROS: As per HPi    Objective:   No flowsheet data found. General: alert, cooperative, no distress     Additional exam findings: We discussed the expected course, resolution and complications of the diagnosis(es) in detail. Medication risks, benefits, costs, interactions, and alternatives were discussed as indicated. I advised her to contact the office if her condition worsens, changes or fails to improve as anticipated. She expressed understanding with the diagnosis(es) and plan.        Jaleel Wiley, who was evaluated through a patient-initiated, synchronous (real-time) audio-encounter, and/or her healthcare decision maker, is aware that it is a billable service, with coverage as determined by her insurance carrier. She provided verbal consent to proceed: Yes, and patient identification was verified. It was conducted pursuant to the emergency declaration under the [address] [place] [address] authority and the [employer] Act. A caregiver was present when appropriate. Ability to conduct physical exam was limited. I was at home. The patient was at home.       rKisti Zee MD

## 2020-08-12 ENCOUNTER — TELEPHONE (OUTPATIENT)
Dept: ONCOLOGY | Age: 80
End: 2020-08-12

## 2020-08-12 ENCOUNTER — HOSPITAL ENCOUNTER (OUTPATIENT)
Dept: LAB | Age: 80
Discharge: HOME | End: 2020-08-12
Payer: MEDICARE

## 2020-08-12 DIAGNOSIS — D69.3 ACUTE ITP (HCC): ICD-10-CM

## 2020-08-12 PROCEDURE — 83013 H PYLORI (C-13) BREATH: CPT

## 2020-08-12 NOTE — TELEPHONE ENCOUNTER
Patient was called and informed that the predniSone and the antibiotics she is on will not effect her test results per Dr. Rui Calero   Pt gave verbal understanding

## 2020-08-12 NOTE — TELEPHONE ENCOUNTER
Patient advised she is taking predniSone and is on antibiotics for her tooth, she would like to know if this have an effect due to her having to get the H Pylori test done, she had it done today @ [facility]

## 2020-08-13 LAB — UREA BREATH TEST QL: NEGATIVE

## 2020-08-18 ENCOUNTER — TELEPHONE (OUTPATIENT)
Dept: ONCOLOGY | Age: 80
End: 2020-08-18

## 2020-08-18 DIAGNOSIS — D69.3 CHRONIC ITP (IDIOPATHIC THROMBOCYTOPENIA) (HCC): Chronic | ICD-10-CM

## 2021-03-11 NOTE — PROGRESS NOTES
DO Meenakshi Vasquez,   My office will contact her.   Thanks,   Thalia         The patient said the same thing about Dr. Manuelito Escamilla not too long ago. I am recommending she find a new practice as it is not [facility] standard to allow switching frequently between physicians. I'm asking Luzma to handle this. She will be considered discharged from my care and I will send a discharge letter.

## 2022-08-15 PROBLEM — M48.02 FORAMINAL STENOSIS OF CERVICAL REGION: Status: ACTIVE | Noted: 2021-09-10

## 2022-08-15 PROBLEM — A04.8 HELICOBACTER PYLORI GASTROINTESTINAL TRACT INFECTION: Status: ACTIVE | Noted: 2021-09-29

## 2022-08-15 PROBLEM — M47.812 ARTHROPATHY OF CERVICAL FACET JOINT: Status: ACTIVE | Noted: 2021-09-10

## 2022-08-15 PROBLEM — F41.1 GENERALIZED ANXIETY DISORDER: Status: ACTIVE | Noted: 2022-08-15

## 2022-08-15 PROBLEM — D69.3 IMMUNE THROMBOCYTOPENIC PURPURA (HCC): Status: ACTIVE | Noted: 2022-08-15

## 2022-09-26 PROBLEM — R10.32 LEFT LOWER QUADRANT PAIN: Chronic | Status: ACTIVE | Noted: 2022-09-26
